# Patient Record
Sex: FEMALE | Race: WHITE | NOT HISPANIC OR LATINO
[De-identification: names, ages, dates, MRNs, and addresses within clinical notes are randomized per-mention and may not be internally consistent; named-entity substitution may affect disease eponyms.]

---

## 2019-10-21 ENCOUNTER — APPOINTMENT (OUTPATIENT)
Dept: OBGYN | Facility: CLINIC | Age: 49
End: 2019-10-21
Payer: COMMERCIAL

## 2019-10-21 ENCOUNTER — RECORD ABSTRACTING (OUTPATIENT)
Age: 49
End: 2019-10-21

## 2019-10-21 ENCOUNTER — OUTPATIENT (OUTPATIENT)
Dept: OUTPATIENT SERVICES | Facility: HOSPITAL | Age: 49
LOS: 1 days | Discharge: HOME | End: 2019-10-21

## 2019-10-21 VITALS
WEIGHT: 204 LBS | HEIGHT: 64 IN | BODY MASS INDEX: 34.83 KG/M2 | HEART RATE: 81 BPM | DIASTOLIC BLOOD PRESSURE: 82 MMHG | SYSTOLIC BLOOD PRESSURE: 128 MMHG

## 2019-10-21 DIAGNOSIS — R10.2 PELVIC AND PERINEAL PAIN: ICD-10-CM

## 2019-10-21 DIAGNOSIS — Z00.00 ENCOUNTER FOR GENERAL ADULT MEDICAL EXAMINATION W/OUT ABNORMAL FINDINGS: ICD-10-CM

## 2019-10-21 LAB — CYTOLOGY CVX/VAG DOC THIN PREP: NORMAL

## 2019-10-21 PROCEDURE — 99396 PREV VISIT EST AGE 40-64: CPT

## 2019-10-21 NOTE — HISTORY OF PRESENT ILLNESS
[Menarche Age: ____] : age at menarche was [unfilled] [Definite:  ___ (Date)] : the last menstrual period was [unfilled] [NA] : N/A [Sexually Active] : is not sexually active

## 2019-10-21 NOTE — CHIEF COMPLAINT
[Annual Visit] : annual visit [FreeTextEntry1] : patient is here for annual visit last Pap smear 8-2018 menses every month regular  moderate flow lasting 5 days with moderate cramps ,

## 2019-10-21 NOTE — PHYSICAL EXAM
[Awake] : awake [Alert] : alert [Mass] : no breast mass [Soft] : soft [Nipple Discharge] : no nipple discharge [Axillary LAD] : no axillary lymphadenopathy [Tender] : non tender [Distended] : not distended [Oriented x3] : oriented to person, place, and time [Normal] : uterus [Uterine Adnexae] : were not tender and not enlarged

## 2019-10-23 DIAGNOSIS — Z00.00 ENCOUNTER FOR GENERAL ADULT MEDICAL EXAMINATION WITHOUT ABNORMAL FINDINGS: ICD-10-CM

## 2019-10-24 LAB — HPV HIGH+LOW RISK DNA PNL CVX: NOT DETECTED

## 2019-12-25 PROBLEM — R10.2 PELVIC PAIN IN FEMALE: Status: ACTIVE | Noted: 2019-10-21

## 2019-12-27 ENCOUNTER — TRANSCRIPTION ENCOUNTER (OUTPATIENT)
Age: 49
End: 2019-12-27

## 2020-01-09 ENCOUNTER — APPOINTMENT (OUTPATIENT)
Dept: NEUROSURGERY | Facility: CLINIC | Age: 50
End: 2020-01-09
Payer: COMMERCIAL

## 2020-01-09 ENCOUNTER — RX RENEWAL (OUTPATIENT)
Age: 50
End: 2020-01-09

## 2020-01-09 VITALS — WEIGHT: 200 LBS | BODY MASS INDEX: 34.15 KG/M2 | HEIGHT: 64 IN

## 2020-01-09 PROCEDURE — 99203 OFFICE O/P NEW LOW 30 MIN: CPT

## 2020-01-09 RX ORDER — ZONISAMIDE 100 MG/1
100 CAPSULE ORAL
Refills: 0 | Status: DISCONTINUED | COMMUNITY
End: 2020-01-09

## 2020-01-09 RX ORDER — EXTENDED PHENYTOIN SODIUM 30 MG/1
CAPSULE ORAL
Refills: 0 | Status: DISCONTINUED | COMMUNITY
End: 2020-01-09

## 2020-01-13 NOTE — PLAN
[FreeTextEntry1] : I discussed the MRI and clinical findings with Ms. Moran in detail.  She may be symptomatic from this C6-7 disc herniation but she has not tried conservative management.  I recommend PT and injections if PT is ineffective.  She will return on an as needed basis.

## 2020-01-13 NOTE — HISTORY OF PRESENT ILLNESS
[FreeTextEntry1] : neck pain [de-identified] : Ms. Moran is a 49 year-old female who presents with neck pain radiating to her right arm.  It is associated with numbness and weakness, although subtle.  She has not had PT or injections as of yet.\par \par MRI demonstrates a left C6-7 HNP

## 2020-06-22 ENCOUNTER — APPOINTMENT (OUTPATIENT)
Dept: NEUROSURGERY | Facility: CLINIC | Age: 50
End: 2020-06-22
Payer: COMMERCIAL

## 2020-06-22 VITALS — BODY MASS INDEX: 34.15 KG/M2 | HEIGHT: 64 IN | WEIGHT: 200 LBS

## 2020-06-22 DIAGNOSIS — M47.812 SPONDYLOSIS W/OUT MYELOPATHY OR RADICULOPATHY, CERVICAL REGION: ICD-10-CM

## 2020-06-22 DIAGNOSIS — Z78.9 OTHER SPECIFIED HEALTH STATUS: ICD-10-CM

## 2020-06-22 DIAGNOSIS — N81.11 CYSTOCELE, MIDLINE: ICD-10-CM

## 2020-06-22 DIAGNOSIS — Z87.42 PERSONAL HISTORY OF OTHER DISEASES OF THE FEMALE GENITAL TRACT: ICD-10-CM

## 2020-06-22 DIAGNOSIS — Z87.19 PERSONAL HISTORY OF OTHER DISEASES OF THE DIGESTIVE SYSTEM: ICD-10-CM

## 2020-06-22 DIAGNOSIS — M50.10 CERVICAL DISC DISORDER WITH RADICULOPATHY, UNSPECIFIED CERVICAL REGION: ICD-10-CM

## 2020-06-22 PROCEDURE — 99213 OFFICE O/P EST LOW 20 MIN: CPT | Mod: 95

## 2020-06-22 RX ORDER — CARBAMAZEPINE 400 MG/1
400 TABLET, EXTENDED RELEASE ORAL
Refills: 0 | Status: DISCONTINUED | COMMUNITY
End: 2020-06-22

## 2020-06-22 NOTE — REASON FOR VISIT
[Home] : at home, [unfilled] , at the time of the visit. [Medical Office: ([facility])___] : at the medical office located in  [Verbal consent obtained from patient] : the patient, [unfilled]

## 2020-06-22 NOTE — ASSESSMENT
[FreeTextEntry1] : Ms. Moran will consult with pain management to discuss CESIs. I will see her back once pain management treatments are completed. \par \par This consultation took 15 minutes.

## 2020-06-22 NOTE — HISTORY OF PRESENT ILLNESS
[FreeTextEntry1] : Ms. Moran continues to have neck pain with referred pain into the right shoulder and arm. She has tingling in the right 3rd digits also. She has completed a course of PT. She has not trialed ESIs. At times she feel's some weakness in the right arm compared to the left. No bowel / bladder dysfunction. Prior MRI confirmed a left C6/7 herniated disc.

## 2020-08-31 ENCOUNTER — APPOINTMENT (OUTPATIENT)
Dept: NEUROLOGY | Facility: CLINIC | Age: 50
End: 2020-08-31
Payer: COMMERCIAL

## 2020-08-31 VITALS
WEIGHT: 200 LBS | HEART RATE: 87 BPM | BODY MASS INDEX: 34.15 KG/M2 | TEMPERATURE: 98.2 F | OXYGEN SATURATION: 97 % | SYSTOLIC BLOOD PRESSURE: 126 MMHG | DIASTOLIC BLOOD PRESSURE: 70 MMHG | HEIGHT: 64 IN

## 2020-08-31 PROCEDURE — 99214 OFFICE O/P EST MOD 30 MIN: CPT

## 2020-09-02 NOTE — HISTORY OF PRESENT ILLNESS
[FreeTextEntry1] : Vani is herefor her F/U last time she was here was Nov.2018?\par She carries the Diagnosis of seizure disorder . not well characterized .\par in the past the EEG that we had done were normal. She was offered to come off the AED or at least to consider monotherapy. she did not agree.\par She also has only scattered follow ups.?\par Today she is not reporting any events since last visit . did not do a blood level. 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

## 2020-09-02 NOTE — ASSESSMENT
[FreeTextEntry1] : Hx of seizure disorder not well characterized\par \par again the options were discussed\par will do level and talk on the phone for the next step if she agrees

## 2020-09-02 NOTE — PHYSICAL EXAM
[FreeTextEntry1] : A/A/Ox3 \par follows 4 step commands\par in a good mood\par good attention and concentration\par CN--normal\par Motor--- normal\par has mid valgus deformity of the right knee when walks\par stable gait

## 2020-10-02 ENCOUNTER — APPOINTMENT (OUTPATIENT)
Dept: NEUROLOGY | Facility: CLINIC | Age: 50
End: 2020-10-02
Payer: COMMERCIAL

## 2020-10-02 PROCEDURE — 95816 EEG AWAKE AND DROWSY: CPT

## 2020-10-05 ENCOUNTER — TRANSCRIPTION ENCOUNTER (OUTPATIENT)
Age: 50
End: 2020-10-05

## 2021-01-13 ENCOUNTER — APPOINTMENT (OUTPATIENT)
Dept: NEUROLOGY | Facility: CLINIC | Age: 51
End: 2021-01-13
Payer: COMMERCIAL

## 2021-01-13 PROCEDURE — 99213 OFFICE O/P EST LOW 20 MIN: CPT | Mod: 95

## 2021-01-13 NOTE — HISTORY OF PRESENT ILLNESS
[Home] : at home, [unfilled] , at the time of the visit. 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 but does not feel tired.\par She says her level of energy is also good\par She has had a blood test done in September. The ZNS level is 5.1 and the Dilantin level is 7.6\par The Vit D level is also low 15. \par Her EEG done after the last visit is normal\par Her lipid profile was normal and so was her CMP and CBC\par No HA\par NO spinal pain

## 2021-01-13 NOTE — PHYSICAL EXAM
[FreeTextEntry1] : A/A/Ox3 follows 4 step commands\par crosses the midline well\par Good attention\par good mood\par CN-- normal\par Motor-- no drift. No fixation

## 2021-01-13 NOTE — ASSESSMENT
[FreeTextEntry1] : Hx of SEizure D/O not well characterized\par Low Vit D level\par \par Plan\par again discussed with her specially considering the low VIt D and  , low dilantin level and not having seizures..it is better to at least come off the dilantin\par will call with her decision

## 2021-04-05 ENCOUNTER — APPOINTMENT (OUTPATIENT)
Dept: OBGYN | Facility: CLINIC | Age: 51
End: 2021-04-05

## 2021-06-01 ENCOUNTER — APPOINTMENT (OUTPATIENT)
Age: 51
End: 2021-06-01

## 2021-06-02 ENCOUNTER — NON-APPOINTMENT (OUTPATIENT)
Age: 51
End: 2021-06-02

## 2021-06-03 ENCOUNTER — APPOINTMENT (OUTPATIENT)
Dept: SURGERY | Facility: CLINIC | Age: 51
End: 2021-06-03
Payer: COMMERCIAL

## 2021-06-03 ENCOUNTER — NON-APPOINTMENT (OUTPATIENT)
Age: 51
End: 2021-06-03

## 2021-06-03 VITALS — HEIGHT: 64 IN | WEIGHT: 203 LBS | BODY MASS INDEX: 34.66 KG/M2

## 2021-06-03 DIAGNOSIS — M62.08 SEPARATION OF MUSCLE (NONTRAUMATIC), OTHER SITE: ICD-10-CM

## 2021-06-03 PROCEDURE — 99243 OFF/OP CNSLTJ NEW/EST LOW 30: CPT

## 2021-06-03 NOTE — PHYSICAL EXAM
[JVD] : no jugular venous distention  [Normal Breath Sounds] : Normal breath sounds [No Rash or Lesion] : No rash or lesion [Alert] : alert [Calm] : calm [de-identified] : overweight [de-identified] : normal [de-identified] : protuberant abdomen, moderate diastasis recti\par  [de-identified] : large incarcerated periumbilical incisional hernia

## 2021-06-03 NOTE — CONSULT LETTER
[Dear  ___] : Dear  [unfilled], [Courtesy Letter:] : I had the pleasure of seeing your patient, [unfilled], in my office today. [Please see my note below.] : Please see my note below. [Consult Closing:] : Thank you very much for allowing me to participate in the care of this patient.  If you have any questions, please do not hesitate to contact me. [FreeTextEntry3] : Respectfully,\par \par Ishan Joseph M.D., FACS\par  [DrLb  ___] : Dr. DAN

## 2021-06-03 NOTE — ASSESSMENT
[FreeTextEntry1] : Vani is a pleasant 51-year-old woman with a past medical history significant for well-controlled seizure disorder (last seizure over 10 years ago) and cigarette smoking (approximately 10 cigarettes a day) any past surgical history significant for a recent laparoscopic procedure to remove her right ovary, both fallopian tubes and a large cyst complicated by a large periumbilical incisional hernia confirmed by recent CT scan now warranting surgical evaluation.\par \par Physical examination demonstrates a large tender grapefruit size bulge in the left periumbilical region which is not reducible consistent with a large incarcerated periumbilical incisional hernia requiring surgical repair. There is no evidence of strangulation and the patient denies any symptoms of obstruction. She does have a moderate diastasis recti which is likely related to her excess abdominal weight. Her current BMI is 35.\par \par I explained the pros and cons of surgery, as well as all risks, benefits, indications and alternatives of the procedure and the patient understood and agreed. Vani was scheduled for the repair of her incarcerated incisional hernia with mesh on Wednesday, July 14, 2021 under LOCAL with [facility] at  with presurgical testing preceding this date. She was encouraged to avoid heavy lifting and strenuous activity in the interim, of course.  We also discussed the importance of calorie restriction and healthy eating with regard to weight loss, hernia recurrence and her overall health.\par \par After our discussion, she is aware of the dangers of cigarette smoking, especially with regard to wound healing, wound complications, hernia development and hernia recurrence.  She was encouraged to quit or minimize smoking in the perioperative period and has agreed to try.

## 2021-06-23 ENCOUNTER — OUTPATIENT (OUTPATIENT)
Dept: OUTPATIENT SERVICES | Facility: HOSPITAL | Age: 51
LOS: 1 days | Discharge: HOME | End: 2021-06-23
Payer: COMMERCIAL

## 2021-06-23 VITALS
RESPIRATION RATE: 14 BRPM | WEIGHT: 206.13 LBS | OXYGEN SATURATION: 97 % | HEART RATE: 73 BPM | HEIGHT: 64 IN | SYSTOLIC BLOOD PRESSURE: 126 MMHG | TEMPERATURE: 98 F | DIASTOLIC BLOOD PRESSURE: 74 MMHG

## 2021-06-23 DIAGNOSIS — Z90.721 ACQUIRED ABSENCE OF OVARIES, UNILATERAL: Chronic | ICD-10-CM

## 2021-06-23 DIAGNOSIS — K43.0 INCISIONAL HERNIA WITH OBSTRUCTION, WITHOUT GANGRENE: ICD-10-CM

## 2021-06-23 DIAGNOSIS — Z01.818 ENCOUNTER FOR OTHER PREPROCEDURAL EXAMINATION: ICD-10-CM

## 2021-06-23 DIAGNOSIS — Z90.79 ACQUIRED ABSENCE OF OTHER GENITAL ORGAN(S): Chronic | ICD-10-CM

## 2021-06-23 LAB
ALBUMIN SERPL ELPH-MCNC: 4.2 G/DL — SIGNIFICANT CHANGE UP (ref 3.5–5.2)
ALP SERPL-CCNC: 236 U/L — HIGH (ref 30–115)
ALT FLD-CCNC: 21 U/L — SIGNIFICANT CHANGE UP (ref 0–41)
ANION GAP SERPL CALC-SCNC: 12 MMOL/L — SIGNIFICANT CHANGE UP (ref 7–14)
APPEARANCE UR: CLEAR — SIGNIFICANT CHANGE UP
AST SERPL-CCNC: 14 U/L — SIGNIFICANT CHANGE UP (ref 0–41)
BASOPHILS # BLD AUTO: 0.06 K/UL — SIGNIFICANT CHANGE UP (ref 0–0.2)
BASOPHILS NFR BLD AUTO: 0.7 % — SIGNIFICANT CHANGE UP (ref 0–1)
BILIRUB SERPL-MCNC: 0.3 MG/DL — SIGNIFICANT CHANGE UP (ref 0.2–1.2)
BILIRUB UR-MCNC: NEGATIVE — SIGNIFICANT CHANGE UP
BUN SERPL-MCNC: 12 MG/DL — SIGNIFICANT CHANGE UP (ref 10–20)
CALCIUM SERPL-MCNC: 8.8 MG/DL — SIGNIFICANT CHANGE UP (ref 8.5–10.1)
CHLORIDE SERPL-SCNC: 107 MMOL/L — SIGNIFICANT CHANGE UP (ref 98–110)
CO2 SERPL-SCNC: 22 MMOL/L — SIGNIFICANT CHANGE UP (ref 17–32)
COLOR SPEC: COLORLESS — SIGNIFICANT CHANGE UP
CREAT SERPL-MCNC: 0.6 MG/DL — LOW (ref 0.7–1.5)
DIFF PNL FLD: NEGATIVE — SIGNIFICANT CHANGE UP
EOSINOPHIL # BLD AUTO: 0.11 K/UL — SIGNIFICANT CHANGE UP (ref 0–0.7)
EOSINOPHIL NFR BLD AUTO: 1.3 % — SIGNIFICANT CHANGE UP (ref 0–8)
GLUCOSE SERPL-MCNC: 86 MG/DL — SIGNIFICANT CHANGE UP (ref 70–99)
GLUCOSE UR QL: NEGATIVE — SIGNIFICANT CHANGE UP
HCT VFR BLD CALC: 42.9 % — SIGNIFICANT CHANGE UP (ref 37–47)
HGB BLD-MCNC: 14.4 G/DL — SIGNIFICANT CHANGE UP (ref 12–16)
IMM GRANULOCYTES NFR BLD AUTO: 0.4 % — HIGH (ref 0.1–0.3)
KETONES UR-MCNC: NEGATIVE — SIGNIFICANT CHANGE UP
LEUKOCYTE ESTERASE UR-ACNC: NEGATIVE — SIGNIFICANT CHANGE UP
LYMPHOCYTES # BLD AUTO: 2 K/UL — SIGNIFICANT CHANGE UP (ref 1.2–3.4)
LYMPHOCYTES # BLD AUTO: 24.4 % — SIGNIFICANT CHANGE UP (ref 20.5–51.1)
MCHC RBC-ENTMCNC: 30.1 PG — SIGNIFICANT CHANGE UP (ref 27–31)
MCHC RBC-ENTMCNC: 33.6 G/DL — SIGNIFICANT CHANGE UP (ref 32–37)
MCV RBC AUTO: 89.7 FL — SIGNIFICANT CHANGE UP (ref 81–99)
MONOCYTES # BLD AUTO: 0.67 K/UL — HIGH (ref 0.1–0.6)
MONOCYTES NFR BLD AUTO: 8.2 % — SIGNIFICANT CHANGE UP (ref 1.7–9.3)
NEUTROPHILS # BLD AUTO: 5.34 K/UL — SIGNIFICANT CHANGE UP (ref 1.4–6.5)
NEUTROPHILS NFR BLD AUTO: 65 % — SIGNIFICANT CHANGE UP (ref 42.2–75.2)
NITRITE UR-MCNC: NEGATIVE — SIGNIFICANT CHANGE UP
NRBC # BLD: 0 /100 WBCS — SIGNIFICANT CHANGE UP (ref 0–0)
PH UR: 6.5 — SIGNIFICANT CHANGE UP (ref 5–8)
PLATELET # BLD AUTO: 247 K/UL — SIGNIFICANT CHANGE UP (ref 130–400)
POTASSIUM SERPL-MCNC: 4.2 MMOL/L — SIGNIFICANT CHANGE UP (ref 3.5–5)
POTASSIUM SERPL-SCNC: 4.2 MMOL/L — SIGNIFICANT CHANGE UP (ref 3.5–5)
PROT SERPL-MCNC: 6.5 G/DL — SIGNIFICANT CHANGE UP (ref 6–8)
PROT UR-MCNC: NEGATIVE — SIGNIFICANT CHANGE UP
RBC # BLD: 4.78 M/UL — SIGNIFICANT CHANGE UP (ref 4.2–5.4)
RBC # FLD: 12.3 % — SIGNIFICANT CHANGE UP (ref 11.5–14.5)
SODIUM SERPL-SCNC: 141 MMOL/L — SIGNIFICANT CHANGE UP (ref 135–146)
SP GR SPEC: 1 — LOW (ref 1.01–1.03)
UROBILINOGEN FLD QL: SIGNIFICANT CHANGE UP
WBC # BLD: 8.21 K/UL — SIGNIFICANT CHANGE UP (ref 4.8–10.8)
WBC # FLD AUTO: 8.21 K/UL — SIGNIFICANT CHANGE UP (ref 4.8–10.8)

## 2021-06-23 PROCEDURE — 93010 ELECTROCARDIOGRAM REPORT: CPT

## 2021-06-23 NOTE — H&P PST ADULT - NSANTHOSAYNRD_GEN_A_CORE
No. NITZA screening performed.  STOP BANG Legend: 0-2 = LOW Risk; 3-4 = INTERMEDIATE Risk; 5-8 = HIGH Risk

## 2021-06-23 NOTE — H&P PST ADULT - NSICDXPASTMEDICALHX_GEN_ALL_CORE_FT
PAST MEDICAL HISTORY:  Cervical herniated disc     History of epilepsy     Pancreatic enlargement tail of pancreas enlarged/inflammed    Pulmonary nodule

## 2021-06-23 NOTE — H&P PST ADULT - HISTORY OF PRESENT ILLNESS
51y Female presents today for presurgical testing for incarcerated incisional hernia repair with mesh. Patient states that since december 2020 she has had a "bulge" in her abdomen which with deep palpation caused her pain. She states since that time, she has had more frequent bowel movements. She states that this bulge appeared shortly after her procedure in december (laparoscopic unilateral oophorectomy and bilateral salpingectomy.).  Patient denies any CP, palpitations, SOB, cough, or dysuria. No recent URI or UTI.  Stated exercise tolerance is FOS walks every evening for 30-40 minutes            NITZA screen reviewed    Patient denies any recent personal exposure to COVID19. Denies any sick contacts. Patient denies any travel within the past 30 days. Patient was instructed to quarantine until after procedure  PATIENT ENDORSES HAVING RECEIVED DOSE #2 OF MODERNA COVID19 VACCINE FINAL WEEK OF 2/2021    K43.0/ 00522/ 82477  Incisional hernia with obstruction but no gangrene  Encounter for other preprocedural examination  ^K43.0/ 45660/ 29760    Anesthesia Alert  YES--Difficult Airway - CLASS IV  NO--History of neck surgery or radiation  NO--Limited ROM of neck  NO--History of Malignant hyperthermia  NO--Personal or family history of Pseudocholinesterase deficiency.  NO--Prior Anesthesia Complication  NO--Latex Allergy  NO--Loose teeth  NO--History of Rheumatoid Arthritis  NO--NITZA  NO--Bleeding risk  NO--Other_____    Patient states that this is their complete medical history and list of medications

## 2021-06-24 ENCOUNTER — RESULT REVIEW (OUTPATIENT)
Age: 51
End: 2021-06-24

## 2021-06-24 PROBLEM — R91.1 SOLITARY PULMONARY NODULE: Chronic | Status: ACTIVE | Noted: 2021-06-23

## 2021-06-24 PROBLEM — M50.20 OTHER CERVICAL DISC DISPLACEMENT, UNSPECIFIED CERVICAL REGION: Chronic | Status: ACTIVE | Noted: 2021-06-23

## 2021-06-24 PROBLEM — K86.9 DISEASE OF PANCREAS, UNSPECIFIED: Chronic | Status: ACTIVE | Noted: 2021-06-23

## 2021-06-24 PROBLEM — Z86.69 PERSONAL HISTORY OF OTHER DISEASES OF THE NERVOUS SYSTEM AND SENSE ORGANS: Chronic | Status: ACTIVE | Noted: 2021-06-23

## 2021-06-24 PROCEDURE — 71046 X-RAY EXAM CHEST 2 VIEWS: CPT | Mod: 26

## 2021-07-11 ENCOUNTER — LABORATORY RESULT (OUTPATIENT)
Age: 51
End: 2021-07-11

## 2021-07-11 ENCOUNTER — OUTPATIENT (OUTPATIENT)
Dept: OUTPATIENT SERVICES | Facility: HOSPITAL | Age: 51
LOS: 1 days | Discharge: HOME | End: 2021-07-11

## 2021-07-11 DIAGNOSIS — Z11.59 ENCOUNTER FOR SCREENING FOR OTHER VIRAL DISEASES: ICD-10-CM

## 2021-07-11 DIAGNOSIS — Z90.721 ACQUIRED ABSENCE OF OVARIES, UNILATERAL: Chronic | ICD-10-CM

## 2021-07-11 DIAGNOSIS — Z90.79 ACQUIRED ABSENCE OF OTHER GENITAL ORGAN(S): Chronic | ICD-10-CM

## 2021-07-13 NOTE — ASU PATIENT PROFILE, ADULT - PMH
Cervical herniated disc    History of epilepsy    Pancreatic enlargement  tail of pancreas enlarged/inflammed  Pulmonary nodule

## 2021-07-14 ENCOUNTER — APPOINTMENT (OUTPATIENT)
Dept: SURGERY | Facility: AMBULATORY SURGERY CENTER | Age: 51
End: 2021-07-14
Payer: COMMERCIAL

## 2021-07-14 ENCOUNTER — OUTPATIENT (OUTPATIENT)
Dept: OUTPATIENT SERVICES | Facility: HOSPITAL | Age: 51
LOS: 1 days | Discharge: HOME | End: 2021-07-14

## 2021-07-14 VITALS
WEIGHT: 206.13 LBS | HEIGHT: 64 IN | HEART RATE: 75 BPM | RESPIRATION RATE: 18 BRPM | SYSTOLIC BLOOD PRESSURE: 136 MMHG | OXYGEN SATURATION: 96 % | TEMPERATURE: 99 F | DIASTOLIC BLOOD PRESSURE: 76 MMHG

## 2021-07-14 VITALS
SYSTOLIC BLOOD PRESSURE: 162 MMHG | TEMPERATURE: 98 F | DIASTOLIC BLOOD PRESSURE: 91 MMHG | OXYGEN SATURATION: 96 % | HEART RATE: 67 BPM | RESPIRATION RATE: 20 BRPM

## 2021-07-14 DIAGNOSIS — Z90.721 ACQUIRED ABSENCE OF OVARIES, UNILATERAL: Chronic | ICD-10-CM

## 2021-07-14 DIAGNOSIS — Z90.79 ACQUIRED ABSENCE OF OTHER GENITAL ORGAN(S): Chronic | ICD-10-CM

## 2021-07-14 PROCEDURE — 49561: CPT

## 2021-07-14 PROCEDURE — 49585: CPT | Mod: XS

## 2021-07-14 PROCEDURE — 49568: CPT

## 2021-07-14 RX ORDER — SODIUM CHLORIDE 9 MG/ML
1000 INJECTION, SOLUTION INTRAVENOUS
Refills: 0 | Status: DISCONTINUED | OUTPATIENT
Start: 2021-07-14 | End: 2021-07-29

## 2021-07-14 RX ORDER — OXYCODONE AND ACETAMINOPHEN 5; 325 MG/1; MG/1
1 TABLET ORAL EVERY 4 HOURS
Refills: 0 | Status: DISCONTINUED | OUTPATIENT
Start: 2021-07-14 | End: 2021-07-14

## 2021-07-14 RX ORDER — HYDROMORPHONE HYDROCHLORIDE 2 MG/ML
0.5 INJECTION INTRAMUSCULAR; INTRAVENOUS; SUBCUTANEOUS
Refills: 0 | Status: DISCONTINUED | OUTPATIENT
Start: 2021-07-14 | End: 2021-07-14

## 2021-07-14 RX ORDER — TRAMADOL HYDROCHLORIDE 50 MG/1
1 TABLET ORAL
Qty: 30 | Refills: 0
Start: 2021-07-14 | End: 2021-07-18

## 2021-07-14 RX ORDER — ONDANSETRON 8 MG/1
4 TABLET, FILM COATED ORAL ONCE
Refills: 0 | Status: DISCONTINUED | OUTPATIENT
Start: 2021-07-14 | End: 2021-07-29

## 2021-07-14 RX ADMIN — OXYCODONE AND ACETAMINOPHEN 1 TABLET(S): 5; 325 TABLET ORAL at 17:31

## 2021-07-14 RX ADMIN — SODIUM CHLORIDE 100 MILLILITER(S): 9 INJECTION, SOLUTION INTRAVENOUS at 16:52

## 2021-07-14 NOTE — CHART NOTE - NSCHARTNOTEFT_GEN_A_CORE
PACU ANESTHESIA ADMISSION NOTE      Procedure: Repair of incarcerated incisional hernia using mesh      Post op diagnosis:  Incarcerated incisional hernia        ____  Intubated  TV:______       Rate: ______      FiO2: ______    _x___  Patent Airway    __x__  Full return of protective reflexes    _x___  Full recovery from anesthesia / back to baseline     Vitals:   T:    97.8       R:   14               BP:  123/75                Sat:  99                P: 70      Mental Status:  ___x_ Awake   __x___ Alert   _____ Drowsy   _____ Sedated    Nausea/Vomiting:  __x__ NO  ______Yes,   See Post - Op Orders          Pain Scale (0-10):  _____    Treatment: __x__ None    ____ See Post - Op/PCA Orders    Post - Operative Fluids:   ____ Oral   _x___ See Post - Op Orders    Plan: Discharge:   __x__Home       _____Floor     _____Critical Care    _____  Other:_________________    Comments:

## 2021-07-16 DIAGNOSIS — K43.0 INCISIONAL HERNIA WITH OBSTRUCTION, WITHOUT GANGRENE: ICD-10-CM

## 2021-07-16 DIAGNOSIS — G40.909 EPILEPSY, UNSPECIFIED, NOT INTRACTABLE, WITHOUT STATUS EPILEPTICUS: ICD-10-CM

## 2021-07-16 DIAGNOSIS — K42.9 UMBILICAL HERNIA WITHOUT OBSTRUCTION OR GANGRENE: ICD-10-CM

## 2021-07-22 ENCOUNTER — APPOINTMENT (OUTPATIENT)
Dept: SURGERY | Facility: CLINIC | Age: 51
End: 2021-07-22
Payer: COMMERCIAL

## 2021-07-22 DIAGNOSIS — E66.09 OTHER OBESITY DUE TO EXCESS CALORIES: ICD-10-CM

## 2021-07-22 DIAGNOSIS — K43.0 INCISIONAL HERNIA WITH OBSTRUCTION, W/OUT GANGRENE: ICD-10-CM

## 2021-07-22 PROCEDURE — 99024 POSTOP FOLLOW-UP VISIT: CPT

## 2021-07-22 NOTE — CONSULT LETTER
[FreeTextEntry1] : Dear Dr. Raul Zamora, \par \par I had the pleasure of seeing your patient, TED ROSE, in my office today. Please see my note below. \par \par Thank you very much for allowing me to participate in the care of this patient. If you have any questions, please do not hesitate to contact me. \par \par \par Respectfully,\par \par Ishan Joseph M.D., FACS\par  \par \par \par \par cc: Dr. Hugo Felder

## 2021-07-22 NOTE — ASSESSMENT
[FreeTextEntry1] : Vani underwent the repair of her incarcerated incisional hernia with mesh and repair of her umbilical hernia with mesh on July 14, 2021 under local with IV sedation without any problems or complications. Her wound is clean, dry and intact. There is no evidence of erythema, seroma formation or infection. She is tolerating a diet and having normal bowel movements. She denies any significant postoperative pain or discomfort at this time.\par \par She was counseled and reassured. Vani was discharged from the office with no specific followup necessary, but she knows to avoid any heavy lifting or strenuous activity for the next several weeks. We also discussed the importance of calorie restriction and healthy eating with regard to weight loss, hernia recurrence and her overall health. She was encouraged to continue to wear her abdominal binder for the better part of the next month.

## 2021-08-03 ENCOUNTER — APPOINTMENT (OUTPATIENT)
Age: 51
End: 2021-08-03
Payer: COMMERCIAL

## 2021-08-03 VITALS
DIASTOLIC BLOOD PRESSURE: 70 MMHG | OXYGEN SATURATION: 97 % | BODY MASS INDEX: 34.66 KG/M2 | WEIGHT: 203 LBS | SYSTOLIC BLOOD PRESSURE: 112 MMHG | HEIGHT: 64 IN | HEART RATE: 90 BPM | RESPIRATION RATE: 14 BRPM

## 2021-08-03 DIAGNOSIS — J44.9 CHRONIC OBSTRUCTIVE PULMONARY DISEASE, UNSPECIFIED: ICD-10-CM

## 2021-08-03 DIAGNOSIS — R91.1 SOLITARY PULMONARY NODULE: ICD-10-CM

## 2021-08-03 PROCEDURE — 99203 OFFICE O/P NEW LOW 30 MIN: CPT

## 2021-08-03 NOTE — PHYSICAL EXAM
[No Acute Distress] : no acute distress [Normal Oropharynx] : normal oropharynx [No Resp Distress] : no resp distress [Clear to Auscultation Bilaterally] : clear to auscultation bilaterally [No Abnormalities] : no abnormalities [Benign] : benign

## 2021-08-03 NOTE — HISTORY OF PRESENT ILLNESS
[Current] : current [< 30 pack-years] : < 30 pack-years [TextBox_4] : 50 yo female , smoker, referred to the clinic for pulmonary nodule evaluation\par Patient is a 30 pack year smoker. She complains of intermittent wheezing. She denies chronic cough , congestion or sputum.\par No Shortness of breath\par CT chest revealed 7 mm nodular opacity in right lower lobe and reticular scarring in the middle lobe , RLL and LLL.\par denies hemoptysis, weight loss or night sweats\par referred no prior CT

## 2021-12-23 ENCOUNTER — TRANSCRIPTION ENCOUNTER (OUTPATIENT)
Age: 51
End: 2021-12-23

## 2022-02-12 ENCOUNTER — TRANSCRIPTION ENCOUNTER (OUTPATIENT)
Age: 52
End: 2022-02-12

## 2022-04-09 ENCOUNTER — TRANSCRIPTION ENCOUNTER (OUTPATIENT)
Age: 52
End: 2022-04-09

## 2022-05-23 ENCOUNTER — EMERGENCY (EMERGENCY)
Facility: HOSPITAL | Age: 52
LOS: 0 days | Discharge: HOME | End: 2022-05-23
Attending: EMERGENCY MEDICINE | Admitting: EMERGENCY MEDICINE
Payer: COMMERCIAL

## 2022-05-23 VITALS
RESPIRATION RATE: 18 BRPM | TEMPERATURE: 97 F | OXYGEN SATURATION: 97 % | HEIGHT: 64 IN | DIASTOLIC BLOOD PRESSURE: 80 MMHG | SYSTOLIC BLOOD PRESSURE: 141 MMHG | HEART RATE: 87 BPM | WEIGHT: 203.93 LBS

## 2022-05-23 DIAGNOSIS — F17.200 NICOTINE DEPENDENCE, UNSPECIFIED, UNCOMPLICATED: ICD-10-CM

## 2022-05-23 DIAGNOSIS — Z90.2 ACQUIRED ABSENCE OF LUNG [PART OF]: ICD-10-CM

## 2022-05-23 DIAGNOSIS — Z90.721 ACQUIRED ABSENCE OF OVARIES, UNILATERAL: Chronic | ICD-10-CM

## 2022-05-23 DIAGNOSIS — R10.30 LOWER ABDOMINAL PAIN, UNSPECIFIED: ICD-10-CM

## 2022-05-23 DIAGNOSIS — G40.909 EPILEPSY, UNSPECIFIED, NOT INTRACTABLE, WITHOUT STATUS EPILEPTICUS: ICD-10-CM

## 2022-05-23 DIAGNOSIS — R10.2 PELVIC AND PERINEAL PAIN: ICD-10-CM

## 2022-05-23 DIAGNOSIS — Z90.79 ACQUIRED ABSENCE OF OTHER GENITAL ORGAN(S): Chronic | ICD-10-CM

## 2022-05-23 LAB
ALBUMIN SERPL ELPH-MCNC: 4 G/DL — SIGNIFICANT CHANGE UP (ref 3.5–5.2)
ALP SERPL-CCNC: 204 U/L — HIGH (ref 30–115)
ALT FLD-CCNC: 22 U/L — SIGNIFICANT CHANGE UP (ref 0–41)
ANION GAP SERPL CALC-SCNC: 10 MMOL/L — SIGNIFICANT CHANGE UP (ref 7–14)
APPEARANCE UR: CLEAR — SIGNIFICANT CHANGE UP
AST SERPL-CCNC: 17 U/L — SIGNIFICANT CHANGE UP (ref 0–41)
BASOPHILS # BLD AUTO: 0.05 K/UL — SIGNIFICANT CHANGE UP (ref 0–0.2)
BASOPHILS NFR BLD AUTO: 0.5 % — SIGNIFICANT CHANGE UP (ref 0–1)
BILIRUB SERPL-MCNC: <0.2 MG/DL — SIGNIFICANT CHANGE UP (ref 0.2–1.2)
BILIRUB UR-MCNC: NEGATIVE — SIGNIFICANT CHANGE UP
BUN SERPL-MCNC: 13 MG/DL — SIGNIFICANT CHANGE UP (ref 10–20)
CALCIUM SERPL-MCNC: 9.4 MG/DL — SIGNIFICANT CHANGE UP (ref 8.5–10.1)
CHLORIDE SERPL-SCNC: 104 MMOL/L — SIGNIFICANT CHANGE UP (ref 98–110)
CO2 SERPL-SCNC: 22 MMOL/L — SIGNIFICANT CHANGE UP (ref 17–32)
COLOR SPEC: SIGNIFICANT CHANGE UP
CREAT SERPL-MCNC: 0.7 MG/DL — SIGNIFICANT CHANGE UP (ref 0.7–1.5)
DIFF PNL FLD: NEGATIVE — SIGNIFICANT CHANGE UP
EGFR: 105 ML/MIN/1.73M2 — SIGNIFICANT CHANGE UP
EOSINOPHIL # BLD AUTO: 0.13 K/UL — SIGNIFICANT CHANGE UP (ref 0–0.7)
EOSINOPHIL NFR BLD AUTO: 1.3 % — SIGNIFICANT CHANGE UP (ref 0–8)
GLUCOSE SERPL-MCNC: 99 MG/DL — SIGNIFICANT CHANGE UP (ref 70–99)
GLUCOSE UR QL: NEGATIVE — SIGNIFICANT CHANGE UP
HCT VFR BLD CALC: 38.9 % — SIGNIFICANT CHANGE UP (ref 37–47)
HGB BLD-MCNC: 13.6 G/DL — SIGNIFICANT CHANGE UP (ref 12–16)
IMM GRANULOCYTES NFR BLD AUTO: 0.4 % — HIGH (ref 0.1–0.3)
KETONES UR-MCNC: NEGATIVE — SIGNIFICANT CHANGE UP
LEUKOCYTE ESTERASE UR-ACNC: NEGATIVE — SIGNIFICANT CHANGE UP
LIDOCAIN IGE QN: 17 U/L — SIGNIFICANT CHANGE UP (ref 7–60)
LYMPHOCYTES # BLD AUTO: 2.13 K/UL — SIGNIFICANT CHANGE UP (ref 1.2–3.4)
LYMPHOCYTES # BLD AUTO: 21 % — SIGNIFICANT CHANGE UP (ref 20.5–51.1)
MCHC RBC-ENTMCNC: 31.9 PG — HIGH (ref 27–31)
MCHC RBC-ENTMCNC: 35 G/DL — SIGNIFICANT CHANGE UP (ref 32–37)
MCV RBC AUTO: 91.1 FL — SIGNIFICANT CHANGE UP (ref 81–99)
MONOCYTES # BLD AUTO: 0.85 K/UL — HIGH (ref 0.1–0.6)
MONOCYTES NFR BLD AUTO: 8.4 % — SIGNIFICANT CHANGE UP (ref 1.7–9.3)
NEUTROPHILS # BLD AUTO: 6.93 K/UL — HIGH (ref 1.4–6.5)
NEUTROPHILS NFR BLD AUTO: 68.4 % — SIGNIFICANT CHANGE UP (ref 42.2–75.2)
NITRITE UR-MCNC: NEGATIVE — SIGNIFICANT CHANGE UP
NRBC # BLD: 0 /100 WBCS — SIGNIFICANT CHANGE UP (ref 0–0)
PH UR: 5.5 — SIGNIFICANT CHANGE UP (ref 5–8)
PLATELET # BLD AUTO: 250 K/UL — SIGNIFICANT CHANGE UP (ref 130–400)
POTASSIUM SERPL-MCNC: 3.9 MMOL/L — SIGNIFICANT CHANGE UP (ref 3.5–5)
POTASSIUM SERPL-SCNC: 3.9 MMOL/L — SIGNIFICANT CHANGE UP (ref 3.5–5)
PROT SERPL-MCNC: 6.4 G/DL — SIGNIFICANT CHANGE UP (ref 6–8)
PROT UR-MCNC: NEGATIVE — SIGNIFICANT CHANGE UP
RBC # BLD: 4.27 M/UL — SIGNIFICANT CHANGE UP (ref 4.2–5.4)
RBC # FLD: 12.7 % — SIGNIFICANT CHANGE UP (ref 11.5–14.5)
SODIUM SERPL-SCNC: 136 MMOL/L — SIGNIFICANT CHANGE UP (ref 135–146)
SP GR SPEC: 1.01 — SIGNIFICANT CHANGE UP (ref 1.01–1.03)
UROBILINOGEN FLD QL: SIGNIFICANT CHANGE UP
WBC # BLD: 10.13 K/UL — SIGNIFICANT CHANGE UP (ref 4.8–10.8)
WBC # FLD AUTO: 10.13 K/UL — SIGNIFICANT CHANGE UP (ref 4.8–10.8)

## 2022-05-23 PROCEDURE — 76830 TRANSVAGINAL US NON-OB: CPT | Mod: 26

## 2022-05-23 PROCEDURE — 99285 EMERGENCY DEPT VISIT HI MDM: CPT

## 2022-05-23 RX ORDER — FLUCONAZOLE 150 MG/1
150 TABLET ORAL ONCE
Refills: 0 | Status: COMPLETED | OUTPATIENT
Start: 2022-05-23 | End: 2022-05-23

## 2022-05-23 RX ORDER — SODIUM CHLORIDE 9 MG/ML
1000 INJECTION INTRAMUSCULAR; INTRAVENOUS; SUBCUTANEOUS ONCE
Refills: 0 | Status: COMPLETED | OUTPATIENT
Start: 2022-05-23 | End: 2022-05-23

## 2022-05-23 RX ADMIN — FLUCONAZOLE 150 MILLIGRAM(S): 150 TABLET ORAL at 04:46

## 2022-05-23 RX ADMIN — SODIUM CHLORIDE 1000 MILLILITER(S): 9 INJECTION INTRAMUSCULAR; INTRAVENOUS; SUBCUTANEOUS at 02:57

## 2022-05-23 NOTE — ED PROVIDER NOTE - CLINICAL SUMMARY MEDICAL DECISION MAKING FREE TEXT BOX
53 yo Female presented ED for pelvic pain.  Patient had blood work which was normal UA normal and ultrasound shows normal patient has appointment with her GYN doctor this week DC home with strict return precautions.

## 2022-05-23 NOTE — ED PROVIDER NOTE - OBJECTIVE STATEMENT
52-year-old female with past medical history of epilepsy and right pleurectomy status post large cyst presents to ED for 1 week of abdominal bloating and 1 day of lower pelvic abdominal pain.  Normal bowel movements no diarrhea.  No dysuria no hematuria no increased frequency no nausea no vomiting. No vaginal bleeding.

## 2022-05-23 NOTE — ED PROVIDER NOTE - PHYSICAL EXAMINATION
Const: Well nourished, well developed, appears stated age  Eyes: PERRL, no conjunctival injection  HENT:  Neck supple without meningismus   CV: RRR, Warm, well-perfused extremities  RESP: CTA B/L, no tachypnea   GI: soft, non-tender, non-distended  MSK: No gross deformities appreciated  Skin: Warm, dry. No rashes  Neuro: Alert, CNs II-XII grossly intact. Sensation and motor function of extremities grossly intact.  Psych: Appropriate mood and affect. Const: Well nourished, well developed, appears stated age  Eyes: PERRL, no conjunctival injection  HENT:  Neck supple without meningismus   CV: RRR, Warm, well-perfused extremities  RESP: CTA B/L, no tachypnea   GI: soft, non-tender, non-distended  Pelvic: thick white discharge  MSK: No gross deformities appreciated  Skin: Warm, dry. No rashes  Neuro: Alert, CNs II-XII grossly intact. Sensation and motor function of extremities grossly intact.  Psych: Appropriate mood and affect.

## 2022-05-23 NOTE — ED PROVIDER NOTE - PATIENT PORTAL LINK FT
You can access the FollowMyHealth Patient Portal offered by [facility] by registering at the following website: [email or web address]. By joining [facility]’s FollowMyHealth portal, you will also be able to view your health information using other applications (apps) compatible with our system.

## 2022-05-23 NOTE — ED ADULT NURSE NOTE - OBJECTIVE STATEMENT
pt  c/o pelvic pain x1 day, pt states there is some pressure when trying to have a BM. urinate.  pt denies bloody or any discharge.  pt denies pain or difficulty urinating.

## 2022-05-25 ENCOUNTER — NON-APPOINTMENT (OUTPATIENT)
Age: 52
End: 2022-05-25

## 2022-05-27 ENCOUNTER — RX RENEWAL (OUTPATIENT)
Age: 52
End: 2022-05-27

## 2022-06-27 ENCOUNTER — RX RENEWAL (OUTPATIENT)
Age: 52
End: 2022-06-27

## 2022-07-06 ENCOUNTER — RX RENEWAL (OUTPATIENT)
Age: 52
End: 2022-07-06

## 2022-08-31 ENCOUNTER — APPOINTMENT (OUTPATIENT)
Dept: NEUROLOGY | Facility: CLINIC | Age: 52
End: 2022-08-31

## 2022-08-31 VITALS
DIASTOLIC BLOOD PRESSURE: 80 MMHG | SYSTOLIC BLOOD PRESSURE: 126 MMHG | WEIGHT: 208 LBS | TEMPERATURE: 97.6 F | OXYGEN SATURATION: 99 % | HEART RATE: 88 BPM | HEIGHT: 64 IN | BODY MASS INDEX: 35.51 KG/M2

## 2022-08-31 PROCEDURE — 99213 OFFICE O/P EST LOW 20 MIN: CPT

## 2022-09-01 NOTE — PHYSICAL EXAM
[FreeTextEntry1] : GEN: NAD, pleasant, cooperative\par CVS: RRR, no carotid bruit\par CHEST: No signs of  distress, on room air\par ABD: Soft, NTTP\par NEURO: \par   MENTAL STATUS: AAOx3\par   LANG/SPEECH: Fluent, intact naming, repetition & comprehension\par   CRANIAL NERVES:\par   II: Pupils equal and reactive, no RAPD, normal visual field and fundus\par   III, IV, VI: EOM intact, no gaze preference or deviation\par   V: normal\par   VII: no facial asymmetry\par   VIII: normal hearing to speech\par   MOTOR: 5/5 in both upper and lower extremities\par   REFLEXES: 2/4 throughout, bilateral flexor plantars\par   COORD: Normal finger to nose and heel to shin, no tremor, no dysmetria\par

## 2022-09-01 NOTE — HISTORY OF PRESENT ILLNESS
[FreeTextEntry1] : Patient is here for follow up\par No seizures, Her sleep pattern is not the best. but does not feel tired.\par \par Current medication:\par -Dilantin 100mg 2 tabs at am , 3 tabs at night..total of 500 mg/ day\par -Zonisamide 100 mg at night, has been on this dose for 10-12 months\par \par No difficulty initiating sleep or maintaining sleep, but waking up due to hot flushes\par \par she is compliant with seizure medication\par Working as a teacher, th1th1th thgthrthathdthethrth\par \par She denies HA, nausea, vomiting, weakness.\par

## 2022-09-01 NOTE — REVIEW OF SYSTEMS
[Recent Weight Gain (___ Lbs)] : recent [unfilled] ~Ulb weight gain [As Noted in HPI] : as noted in HPI [Negative] : Constitutional [Seizures] : no convulsions [Dizziness] : no dizziness

## 2022-09-01 NOTE — ASSESSMENT
[FreeTextEntry1] : Hx of SEizure disorder, not well characterized\par Low Vit D level\par \par Plan\par Will check phenytoin, zonisamide, and vitamin D level\par Plan to take her off Dilantin eventually\par

## 2022-10-04 ENCOUNTER — NON-APPOINTMENT (OUTPATIENT)
Age: 52
End: 2022-10-04

## 2022-10-09 ENCOUNTER — NON-APPOINTMENT (OUTPATIENT)
Age: 52
End: 2022-10-09

## 2023-04-14 ENCOUNTER — RX RENEWAL (OUTPATIENT)
Age: 53
End: 2023-04-14

## 2023-04-27 NOTE — ASU PREOP CHECKLIST - AS BP NONINV SITE
left upper arm Zyclara Counseling:  I discussed with the patient the risks of imiquimod including but not limited to erythema, scaling, itching, weeping, crusting, and pain.  Patient understands that the inflammatory response to imiquimod is variable from person to person and was educated regarded proper titration schedule.  If flu-like symptoms develop, patient knows to discontinue the medication and contact us.

## 2023-07-21 ENCOUNTER — RX RENEWAL (OUTPATIENT)
Age: 53
End: 2023-07-21

## 2023-08-03 ENCOUNTER — RX RENEWAL (OUTPATIENT)
Age: 53
End: 2023-08-03

## 2023-08-07 ENCOUNTER — APPOINTMENT (OUTPATIENT)
Dept: ENDOCRINOLOGY | Facility: CLINIC | Age: 53
End: 2023-08-07
Payer: COMMERCIAL

## 2023-08-07 VITALS
HEIGHT: 64 IN | BODY MASS INDEX: 35 KG/M2 | DIASTOLIC BLOOD PRESSURE: 76 MMHG | RESPIRATION RATE: 18 BRPM | OXYGEN SATURATION: 96 % | WEIGHT: 205 LBS | HEART RATE: 77 BPM | SYSTOLIC BLOOD PRESSURE: 124 MMHG

## 2023-08-07 DIAGNOSIS — Z78.9 OTHER SPECIFIED HEALTH STATUS: ICD-10-CM

## 2023-08-07 DIAGNOSIS — Z02.82 ENCOUNTER FOR ADOPTION SERVICES: ICD-10-CM

## 2023-08-07 DIAGNOSIS — Z78.0 ASYMPTOMATIC MENOPAUSAL STATE: ICD-10-CM

## 2023-08-07 PROCEDURE — 99204 OFFICE O/P NEW MOD 45 MIN: CPT

## 2023-08-07 PROCEDURE — 99214 OFFICE O/P EST MOD 30 MIN: CPT

## 2023-08-07 RX ORDER — ALBUTEROL SULFATE 90 UG/1
108 (90 BASE) AEROSOL, METERED RESPIRATORY (INHALATION)
Qty: 1 | Refills: 5 | Status: COMPLETED | COMMUNITY
Start: 2021-08-03 | End: 2023-08-07

## 2023-08-07 RX ORDER — CETIRIZINE HYDROCHLORIDE 10 MG/1
CAPSULE, LIQUID FILLED ORAL
Refills: 0 | Status: ACTIVE | COMMUNITY

## 2023-08-07 RX ORDER — ZONISAMIDE 100 MG/1
100 CAPSULE ORAL
Qty: 360 | Refills: 3 | Status: COMPLETED | COMMUNITY
End: 2023-08-07

## 2023-08-07 RX ORDER — CHOLECALCIFEROL (VITAMIN D3) 1250 MCG
1.25 MG CAPSULE ORAL
Qty: 13 | Refills: 3 | Status: ACTIVE | COMMUNITY
Start: 2023-08-07 | End: 1900-01-01

## 2023-08-07 NOTE — PAST MEDICAL HISTORY
[Menstruating] : The patient is menstruating [Definite ___ (Date)] : the last menstrual period was [unfilled] [Less Bleeding] : the period was lighter than normal [Total Preg ___] : G[unfilled] [Live Births ___] : P[unfilled]  [Full Term ___] : Full Term: [unfilled] [Abortions ___] : Abortions:[unfilled] [Living ___] : Living: [unfilled] [Multiple Births ___] :  multiple birth pregnancies: [unfilled]

## 2023-08-07 NOTE — PHYSICAL EXAM
[Alert] : alert [Well Nourished] : well nourished [Obese] : obese [No Acute Distress] : no acute distress [Well Developed] : well developed [Normal Sclera/Conjunctiva] : normal sclera/conjunctiva [EOMI] : extra ocular movement intact [PERRL] : pupils equal, round and reactive to light [Normal Outer Ear/Nose] : the ears and nose were normal in appearance [Normal Hearing] : hearing was normal [Supple] : the neck was supple [Thyroid Not Enlarged] : the thyroid was not enlarged [No Respiratory Distress] : no respiratory distress [No Accessory Muscle Use] : no accessory muscle use [Clear to Auscultation] : lungs were clear to auscultation bilaterally [Normal S1, S2] : normal S1 and S2 [Normal Rate] : heart rate was normal [Regular Rhythm] : with a regular rhythm [No Edema] : no peripheral edema [Pedal Pulses Normal] : the pedal pulses are present [Normal Bowel Sounds] : normal bowel sounds [Not Tender] : non-tender [Not Distended] : not distended [Soft] : abdomen soft [Normal Anterior Cervical Nodes] : no anterior cervical lymphadenopathy [Normal Posterior Cervical Nodes] : no posterior cervical lymphadenopathy [No CVA Tenderness] : no ~M costovertebral angle tenderness [No Spinal Tenderness] : no spinal tenderness [Spine Straight] : spine straight [Kyphosis] : no kyphosis present [Scoliosis] : no scoliosis [No Stigmata of Cushings Syndrome] : no stigmata of Cushings Syndrome [Normal Gait] : normal gait [Normal Strength/Tone] : muscle strength and tone were normal [No Rash] : no rash [Acanthosis Nigricans] : no acanthosis nigricans [Cranial Nerves Intact] : cranial nerves 2-12 were intact [No Motor Deficits] : the motor exam was normal [Normal Reflexes] : deep tendon reflexes were 2+ and symmetric [No Tremors] : no tremors [Oriented x3] : oriented to person, place, and time [Normal Affect] : the affect was normal [Normal Mood] : the mood was normal [de-identified] : 4/21/23 thyroid nono AMDS- 8mm solid nodule [de-identified] : adrenal adenoma, TSH low, thyroid nodule, obeisty [de-identified] : h/o seizures, last 2009

## 2023-08-07 NOTE — DATA REVIEWED
[FreeTextEntry1] : aldoterone plasma renin 0.12 L, aldosterone/ Pra 50.0 H , aldosterone  6, metanephrine , free <25, cortisol 25.8 H, TSH 1.01 ft4 1.2, vit d , 25 oh 17L, glucose 90, gfr 107, tsh 0.02l , ft4 1.2, vitamin d 21L, Zonisamide 1.5L, phenytoin 6.8 L  tetosterone free 1.1, total tesotsterone 33, vit d 41, chol 166, hdl 68, hdl 62, ldl 84, dhea 26, b12 257, hgba1c 4.9

## 2023-08-07 NOTE — HISTORY OF PRESENT ILLNESS
[FreeTextEntry1] : In 2021 noted Adrenal Adenoma in CT scan and followed up by Dr. Arambula, Nephrology and refer to Endocrine. History of seizures and last was 2009. Will repeat the cortisol level, she will do the labs with Dr. Arambula including 24 hour urine, next visit. For now will follow up the hyperthyroid and will do labs and repeat the sonogram Will refer to Dr. Loera for Adrena next visit.

## 2023-08-07 NOTE — REASON FOR VISIT
[Initial Evaluation] : an initial evaluation [Adrenal Evaluation/Adrenal Disorder] : adrenal evaluation/adrenal disorder [Weight Management/Obesity] : weight management/obesity [FreeTextEntry2] : Dr. Zamora

## 2023-08-07 NOTE — ASSESSMENT
[Smoking Cessation] : smoking cessation [Weight Loss] : weight loss [FreeTextEntry4] : [address] diet, lose weight [FreeTextEntry1] : diet and exercise

## 2023-08-23 ENCOUNTER — NON-APPOINTMENT (OUTPATIENT)
Age: 53
End: 2023-08-23

## 2023-10-12 ENCOUNTER — APPOINTMENT (OUTPATIENT)
Dept: ENDOCRINOLOGY | Facility: CLINIC | Age: 53
End: 2023-10-12
Payer: COMMERCIAL

## 2023-10-12 VITALS
OXYGEN SATURATION: 98 % | WEIGHT: 203 LBS | HEART RATE: 74 BPM | SYSTOLIC BLOOD PRESSURE: 124 MMHG | RESPIRATION RATE: 18 BRPM | DIASTOLIC BLOOD PRESSURE: 76 MMHG | BODY MASS INDEX: 34.66 KG/M2 | HEIGHT: 64 IN

## 2023-10-12 DIAGNOSIS — D35.00 BENIGN NEOPLASM OF UNSPECIFIED ADRENAL GLAND: ICD-10-CM

## 2023-10-12 DIAGNOSIS — E55.9 VITAMIN D DEFICIENCY, UNSPECIFIED: ICD-10-CM

## 2023-10-12 PROCEDURE — 99214 OFFICE O/P EST MOD 30 MIN: CPT

## 2023-11-14 ENCOUNTER — APPOINTMENT (OUTPATIENT)
Dept: ENDOCRINOLOGY | Facility: CLINIC | Age: 53
End: 2023-11-14

## 2023-11-23 ENCOUNTER — INPATIENT (INPATIENT)
Facility: HOSPITAL | Age: 53
LOS: 1 days | Discharge: LEFT BEFORE DISCHARGE | DRG: 101 | End: 2023-11-25
Attending: STUDENT IN AN ORGANIZED HEALTH CARE EDUCATION/TRAINING PROGRAM | Admitting: STUDENT IN AN ORGANIZED HEALTH CARE EDUCATION/TRAINING PROGRAM
Payer: COMMERCIAL

## 2023-11-23 VITALS
TEMPERATURE: 98 F | HEART RATE: 104 BPM | RESPIRATION RATE: 18 BRPM | OXYGEN SATURATION: 93 % | DIASTOLIC BLOOD PRESSURE: 68 MMHG | SYSTOLIC BLOOD PRESSURE: 132 MMHG | WEIGHT: 199.96 LBS

## 2023-11-23 DIAGNOSIS — Z87.09 PERSONAL HISTORY OF OTHER DISEASES OF THE RESPIRATORY SYSTEM: ICD-10-CM

## 2023-11-23 DIAGNOSIS — Z90.721 ACQUIRED ABSENCE OF OVARIES, UNILATERAL: Chronic | ICD-10-CM

## 2023-11-23 DIAGNOSIS — Z90.79 ACQUIRED ABSENCE OF OTHER GENITAL ORGAN(S): Chronic | ICD-10-CM

## 2023-11-23 LAB
ALBUMIN SERPL ELPH-MCNC: 4 G/DL — SIGNIFICANT CHANGE UP (ref 3.5–5.2)
ALBUMIN SERPL ELPH-MCNC: 4 G/DL — SIGNIFICANT CHANGE UP (ref 3.5–5.2)
ALP SERPL-CCNC: 208 U/L — HIGH (ref 30–115)
ALP SERPL-CCNC: 208 U/L — HIGH (ref 30–115)
ALT FLD-CCNC: 30 U/L — SIGNIFICANT CHANGE UP (ref 0–41)
ALT FLD-CCNC: 30 U/L — SIGNIFICANT CHANGE UP (ref 0–41)
ANION GAP SERPL CALC-SCNC: 10 MMOL/L — SIGNIFICANT CHANGE UP (ref 7–14)
ANION GAP SERPL CALC-SCNC: 10 MMOL/L — SIGNIFICANT CHANGE UP (ref 7–14)
APPEARANCE UR: ABNORMAL
APPEARANCE UR: ABNORMAL
AST SERPL-CCNC: 24 U/L — SIGNIFICANT CHANGE UP (ref 0–41)
AST SERPL-CCNC: 24 U/L — SIGNIFICANT CHANGE UP (ref 0–41)
BASOPHILS # BLD AUTO: 0.04 K/UL — SIGNIFICANT CHANGE UP (ref 0–0.2)
BASOPHILS # BLD AUTO: 0.04 K/UL — SIGNIFICANT CHANGE UP (ref 0–0.2)
BASOPHILS NFR BLD AUTO: 0.3 % — SIGNIFICANT CHANGE UP (ref 0–1)
BASOPHILS NFR BLD AUTO: 0.3 % — SIGNIFICANT CHANGE UP (ref 0–1)
BILIRUB SERPL-MCNC: 0.2 MG/DL — SIGNIFICANT CHANGE UP (ref 0.2–1.2)
BILIRUB SERPL-MCNC: 0.2 MG/DL — SIGNIFICANT CHANGE UP (ref 0.2–1.2)
BILIRUB UR-MCNC: NEGATIVE — SIGNIFICANT CHANGE UP
BILIRUB UR-MCNC: NEGATIVE — SIGNIFICANT CHANGE UP
BUN SERPL-MCNC: 13 MG/DL — SIGNIFICANT CHANGE UP (ref 10–20)
BUN SERPL-MCNC: 13 MG/DL — SIGNIFICANT CHANGE UP (ref 10–20)
CALCIUM SERPL-MCNC: 9.1 MG/DL — SIGNIFICANT CHANGE UP (ref 8.4–10.5)
CALCIUM SERPL-MCNC: 9.1 MG/DL — SIGNIFICANT CHANGE UP (ref 8.4–10.5)
CHLORIDE SERPL-SCNC: 102 MMOL/L — SIGNIFICANT CHANGE UP (ref 98–110)
CHLORIDE SERPL-SCNC: 102 MMOL/L — SIGNIFICANT CHANGE UP (ref 98–110)
CO2 SERPL-SCNC: 22 MMOL/L — SIGNIFICANT CHANGE UP (ref 17–32)
CO2 SERPL-SCNC: 22 MMOL/L — SIGNIFICANT CHANGE UP (ref 17–32)
COLOR SPEC: YELLOW — SIGNIFICANT CHANGE UP
COLOR SPEC: YELLOW — SIGNIFICANT CHANGE UP
CREAT SERPL-MCNC: 0.5 MG/DL — LOW (ref 0.7–1.5)
CREAT SERPL-MCNC: 0.5 MG/DL — LOW (ref 0.7–1.5)
DIFF PNL FLD: NEGATIVE — SIGNIFICANT CHANGE UP
DIFF PNL FLD: NEGATIVE — SIGNIFICANT CHANGE UP
EGFR: 113 ML/MIN/1.73M2 — SIGNIFICANT CHANGE UP
EGFR: 113 ML/MIN/1.73M2 — SIGNIFICANT CHANGE UP
EOSINOPHIL # BLD AUTO: 0.01 K/UL — SIGNIFICANT CHANGE UP (ref 0–0.7)
EOSINOPHIL # BLD AUTO: 0.01 K/UL — SIGNIFICANT CHANGE UP (ref 0–0.7)
EOSINOPHIL NFR BLD AUTO: 0.1 % — SIGNIFICANT CHANGE UP (ref 0–8)
EOSINOPHIL NFR BLD AUTO: 0.1 % — SIGNIFICANT CHANGE UP (ref 0–8)
GAS PNL BLDV: SIGNIFICANT CHANGE UP
GAS PNL BLDV: SIGNIFICANT CHANGE UP
GLUCOSE SERPL-MCNC: 110 MG/DL — HIGH (ref 70–99)
GLUCOSE SERPL-MCNC: 110 MG/DL — HIGH (ref 70–99)
GLUCOSE UR QL: NEGATIVE MG/DL — SIGNIFICANT CHANGE UP
GLUCOSE UR QL: NEGATIVE MG/DL — SIGNIFICANT CHANGE UP
HCT VFR BLD CALC: 41.4 % — SIGNIFICANT CHANGE UP (ref 37–47)
HCT VFR BLD CALC: 41.4 % — SIGNIFICANT CHANGE UP (ref 37–47)
HGB BLD-MCNC: 14 G/DL — SIGNIFICANT CHANGE UP (ref 12–16)
HGB BLD-MCNC: 14 G/DL — SIGNIFICANT CHANGE UP (ref 12–16)
IMM GRANULOCYTES NFR BLD AUTO: 0.3 % — SIGNIFICANT CHANGE UP (ref 0.1–0.3)
IMM GRANULOCYTES NFR BLD AUTO: 0.3 % — SIGNIFICANT CHANGE UP (ref 0.1–0.3)
KETONES UR-MCNC: NEGATIVE MG/DL — SIGNIFICANT CHANGE UP
KETONES UR-MCNC: NEGATIVE MG/DL — SIGNIFICANT CHANGE UP
LACTATE SERPL-SCNC: 0.9 MMOL/L — SIGNIFICANT CHANGE UP (ref 0.7–2)
LACTATE SERPL-SCNC: 0.9 MMOL/L — SIGNIFICANT CHANGE UP (ref 0.7–2)
LEUKOCYTE ESTERASE UR-ACNC: NEGATIVE — SIGNIFICANT CHANGE UP
LEUKOCYTE ESTERASE UR-ACNC: NEGATIVE — SIGNIFICANT CHANGE UP
LYMPHOCYTES # BLD AUTO: 16.4 % — LOW (ref 20.5–51.1)
LYMPHOCYTES # BLD AUTO: 16.4 % — LOW (ref 20.5–51.1)
LYMPHOCYTES # BLD AUTO: 2 K/UL — SIGNIFICANT CHANGE UP (ref 1.2–3.4)
LYMPHOCYTES # BLD AUTO: 2 K/UL — SIGNIFICANT CHANGE UP (ref 1.2–3.4)
MAGNESIUM SERPL-MCNC: 2.4 MG/DL — SIGNIFICANT CHANGE UP (ref 1.8–2.4)
MAGNESIUM SERPL-MCNC: 2.4 MG/DL — SIGNIFICANT CHANGE UP (ref 1.8–2.4)
MCHC RBC-ENTMCNC: 29.9 PG — SIGNIFICANT CHANGE UP (ref 27–31)
MCHC RBC-ENTMCNC: 29.9 PG — SIGNIFICANT CHANGE UP (ref 27–31)
MCHC RBC-ENTMCNC: 33.8 G/DL — SIGNIFICANT CHANGE UP (ref 32–37)
MCHC RBC-ENTMCNC: 33.8 G/DL — SIGNIFICANT CHANGE UP (ref 32–37)
MCV RBC AUTO: 88.5 FL — SIGNIFICANT CHANGE UP (ref 81–99)
MCV RBC AUTO: 88.5 FL — SIGNIFICANT CHANGE UP (ref 81–99)
MONOCYTES # BLD AUTO: 0.64 K/UL — HIGH (ref 0.1–0.6)
MONOCYTES # BLD AUTO: 0.64 K/UL — HIGH (ref 0.1–0.6)
MONOCYTES NFR BLD AUTO: 5.3 % — SIGNIFICANT CHANGE UP (ref 1.7–9.3)
MONOCYTES NFR BLD AUTO: 5.3 % — SIGNIFICANT CHANGE UP (ref 1.7–9.3)
NEUTROPHILS # BLD AUTO: 9.43 K/UL — HIGH (ref 1.4–6.5)
NEUTROPHILS # BLD AUTO: 9.43 K/UL — HIGH (ref 1.4–6.5)
NEUTROPHILS NFR BLD AUTO: 77.6 % — HIGH (ref 42.2–75.2)
NEUTROPHILS NFR BLD AUTO: 77.6 % — HIGH (ref 42.2–75.2)
NITRITE UR-MCNC: NEGATIVE — SIGNIFICANT CHANGE UP
NITRITE UR-MCNC: NEGATIVE — SIGNIFICANT CHANGE UP
NRBC # BLD: 0 /100 WBCS — SIGNIFICANT CHANGE UP (ref 0–0)
NRBC # BLD: 0 /100 WBCS — SIGNIFICANT CHANGE UP (ref 0–0)
PH UR: 6 — SIGNIFICANT CHANGE UP (ref 5–8)
PH UR: 6 — SIGNIFICANT CHANGE UP (ref 5–8)
PHENYTOIN FREE SERPL-MCNC: 3.4 UG/ML — LOW (ref 10–20)
PHENYTOIN FREE SERPL-MCNC: 3.4 UG/ML — LOW (ref 10–20)
PHOSPHATE SERPL-MCNC: 3.7 MG/DL — SIGNIFICANT CHANGE UP (ref 2.1–4.9)
PHOSPHATE SERPL-MCNC: 3.7 MG/DL — SIGNIFICANT CHANGE UP (ref 2.1–4.9)
PLATELET # BLD AUTO: 222 K/UL — SIGNIFICANT CHANGE UP (ref 130–400)
PLATELET # BLD AUTO: 222 K/UL — SIGNIFICANT CHANGE UP (ref 130–400)
PMV BLD: 10.5 FL — HIGH (ref 7.4–10.4)
PMV BLD: 10.5 FL — HIGH (ref 7.4–10.4)
POTASSIUM SERPL-MCNC: 4.2 MMOL/L — SIGNIFICANT CHANGE UP (ref 3.5–5)
POTASSIUM SERPL-MCNC: 4.2 MMOL/L — SIGNIFICANT CHANGE UP (ref 3.5–5)
POTASSIUM SERPL-SCNC: 4.2 MMOL/L — SIGNIFICANT CHANGE UP (ref 3.5–5)
POTASSIUM SERPL-SCNC: 4.2 MMOL/L — SIGNIFICANT CHANGE UP (ref 3.5–5)
PROT SERPL-MCNC: 6.4 G/DL — SIGNIFICANT CHANGE UP (ref 6–8)
PROT SERPL-MCNC: 6.4 G/DL — SIGNIFICANT CHANGE UP (ref 6–8)
PROT UR-MCNC: NEGATIVE MG/DL — SIGNIFICANT CHANGE UP
PROT UR-MCNC: NEGATIVE MG/DL — SIGNIFICANT CHANGE UP
RBC # BLD: 4.68 M/UL — SIGNIFICANT CHANGE UP (ref 4.2–5.4)
RBC # BLD: 4.68 M/UL — SIGNIFICANT CHANGE UP (ref 4.2–5.4)
RBC # FLD: 12.5 % — SIGNIFICANT CHANGE UP (ref 11.5–14.5)
RBC # FLD: 12.5 % — SIGNIFICANT CHANGE UP (ref 11.5–14.5)
SODIUM SERPL-SCNC: 134 MMOL/L — LOW (ref 135–146)
SODIUM SERPL-SCNC: 134 MMOL/L — LOW (ref 135–146)
SP GR SPEC: 1.01 — SIGNIFICANT CHANGE UP (ref 1–1.03)
SP GR SPEC: 1.01 — SIGNIFICANT CHANGE UP (ref 1–1.03)
UROBILINOGEN FLD QL: 0.2 MG/DL — SIGNIFICANT CHANGE UP (ref 0.2–1)
UROBILINOGEN FLD QL: 0.2 MG/DL — SIGNIFICANT CHANGE UP (ref 0.2–1)
WBC # BLD: 12.16 K/UL — HIGH (ref 4.8–10.8)
WBC # BLD: 12.16 K/UL — HIGH (ref 4.8–10.8)
WBC # FLD AUTO: 12.16 K/UL — HIGH (ref 4.8–10.8)
WBC # FLD AUTO: 12.16 K/UL — HIGH (ref 4.8–10.8)

## 2023-11-23 PROCEDURE — 85025 COMPLETE CBC W/AUTO DIFF WBC: CPT

## 2023-11-23 PROCEDURE — 83735 ASSAY OF MAGNESIUM: CPT

## 2023-11-23 PROCEDURE — 95700 EEG CONT REC W/VID EEG TECH: CPT

## 2023-11-23 PROCEDURE — 80185 ASSAY OF PHENYTOIN TOTAL: CPT

## 2023-11-23 PROCEDURE — 95716 VEEG EA 12-26HR CONT MNTR: CPT

## 2023-11-23 PROCEDURE — 36415 COLL VENOUS BLD VENIPUNCTURE: CPT

## 2023-11-23 PROCEDURE — 73660 X-RAY EXAM OF TOE(S): CPT | Mod: 26,LT

## 2023-11-23 PROCEDURE — 31575 DIAGNOSTIC LARYNGOSCOPY: CPT

## 2023-11-23 PROCEDURE — 82962 GLUCOSE BLOOD TEST: CPT

## 2023-11-23 PROCEDURE — 99285 EMERGENCY DEPT VISIT HI MDM: CPT

## 2023-11-23 PROCEDURE — 70450 CT HEAD/BRAIN W/O DYE: CPT | Mod: 26,MA

## 2023-11-23 PROCEDURE — 80053 COMPREHEN METABOLIC PANEL: CPT

## 2023-11-23 PROCEDURE — 93010 ELECTROCARDIOGRAM REPORT: CPT

## 2023-11-23 RX ORDER — ZONISAMIDE 100 MG
200 CAPSULE ORAL
Qty: 0 | Refills: 0 | DISCHARGE

## 2023-11-23 RX ORDER — ONDANSETRON 8 MG/1
4 TABLET, FILM COATED ORAL EVERY 8 HOURS
Refills: 0 | Status: DISCONTINUED | OUTPATIENT
Start: 2023-11-23 | End: 2023-11-25

## 2023-11-23 RX ORDER — PANTOPRAZOLE SODIUM 20 MG/1
40 TABLET, DELAYED RELEASE ORAL
Refills: 0 | Status: DISCONTINUED | OUTPATIENT
Start: 2023-11-23 | End: 2023-11-25

## 2023-11-23 RX ORDER — LANOLIN ALCOHOL/MO/W.PET/CERES
3 CREAM (GRAM) TOPICAL AT BEDTIME
Refills: 0 | Status: DISCONTINUED | OUTPATIENT
Start: 2023-11-23 | End: 2023-11-25

## 2023-11-23 RX ORDER — ACETAMINOPHEN 500 MG
650 TABLET ORAL EVERY 6 HOURS
Refills: 0 | Status: DISCONTINUED | OUTPATIENT
Start: 2023-11-23 | End: 2023-11-25

## 2023-11-23 RX ORDER — LEVETIRACETAM 250 MG/1
1000 TABLET, FILM COATED ORAL ONCE
Refills: 0 | Status: COMPLETED | OUTPATIENT
Start: 2023-11-23 | End: 2023-11-23

## 2023-11-23 RX ADMIN — Medication 1000 MILLIGRAM(S): at 22:36

## 2023-11-23 RX ADMIN — Medication 540 MILLIGRAM(S): at 21:49

## 2023-11-23 RX ADMIN — Medication 212 MILLIGRAM(S): at 19:19

## 2023-11-23 NOTE — ED PROVIDER NOTE - PHYSICAL EXAMINATION
CONSTITUTIONAL: well-appearing, well nourished, non-toxic, NAD  SKIN: Warm dry, normal skin turgor  HEAD: NCAT  EYES: EOMI, PERRLA b/l, no scleral icterus, +Right sided nystagmus, exhaustible   ENT: Moist mucous membranes, enlarged tonsils b/l, bite marks b/l on tongue  NECK: Supple; non tender. Full ROM.  CARD: RRR, no murmurs, rubs or gallops  RESP: clear to ausculation b/l.  No rales, rhonchi, or wheezing.  EXT: Full ROM, no pedal edema, no calf tenderness, + left 1st digit ecchymosis and tenderness.  NEURO: normal motor. normal sensory. Cerebellar testing normal. Normal gait, -Pronator drift, -heel to shin, -finger to nose  PSYCH: Cooperative, appropriate.

## 2023-11-23 NOTE — ED PROVIDER NOTE - ATTENDING CONTRIBUTION TO CARE
53-year-old female past medical history as above presents with seizure.  Has been maintained on Dilantin 200 presented hospital  Has history of a single.    Exam as above CT repeat electrolytes neuroconsult 53-year-old female past medical history as above presents with seizure.  Has been maintained on Dilantin 200  Presented hospital x 3 times seizure in the past 24 hours,     Exam as above CT repeat electrolytes neuroconsult concern for status

## 2023-11-23 NOTE — ED PROVIDER NOTE - OBJECTIVE STATEMENT
53y F with history of epilepsy and pancreatic enlargement presents for 3 episodes of seizures since last night.  Patient has not had seizures.  Patient has been maintained on Dilantin 200mg in the morning and 300 mg at night.  Patient was at a basketball game last night when she experienced a seizure while sitting down.  Patient felt it coming on, so she laid down and had a myoclonic seizure for 2 minutes with incontinence.  Patient had tongue biting as well.  She went to [facility] and was cleared.  Patient took her normal morning dose but still experienced seizure after coming out of the shower this morning and she laid on the bathroom ground experienced another seizure for 1–2 minutes without incontinence.  Patient experienced another seizure when in the car with her son.  The car turned over so she can lay down and ambulance was called.  Patient states she injured her left big toe while convulsing.    3 and a half weeks ago patient was diagnosed with hyperthyroidism and was prescribed methimazole which she is no longer taking.  Patient developed a pharyngitis for which she was placed on antibiotics, followed by an pneumonia for which she was given amoxicillin and prednisone.  Patient then developed laryngitis was increased on her dose of prednisone.  Patient's voice has been decreasing.  An ENT used a laryngoscope and determined she had swelling in her vocal cords and tonsils.    Patient states her cough has improved denies fever, chills, shortness of breath, chest pain, abdominal pain, urinary symptoms, diarrhea, constipation, nausea, vomiting, numbness or tingling. 53y F with history of epilepsy and pancreatic enlargement presents for 3 episodes of seizures since last night.  Patient has not had seizures.  Patient has been maintained on Dilantin 200mg in the morning and 300 mg at night.  Patient was at a basketball game last night when she experienced a seizure while sitting down.  Patient felt it coming on, so she laid down and had a myoclonic seizure for 2 minutes with incontinence.  Patient had tongue biting as well.  She went to [facility] and was cleared.  Patient took her normal morning dose but still experienced seizure after coming out of the shower this morning and she laid on the bathroom ground experienced another seizure for 1–2 minutes without incontinence.  Patient experienced another seizure when in the car with her son.  The car turned to the side so she can lay down and ambulance was called.  Patient states she injured her left big toe while convulsing. Pt's normal Dilantin levels are lower than normal, but stay around 5 ug/mL    3 and a half weeks ago patient was diagnosed with hyperthyroidism and was prescribed methimazole which she is no longer taking.  Patient developed a pharyngitis for which she was placed on antibiotics, followed by an pneumonia for which she was given amoxicillin and prednisone.  Patient then developed laryngitis was increased on her dose of prednisone.  Patient's voice has been decreasing.  An ENT used a laryngoscope and determined she had swelling in her vocal cords and tonsils.    Patient states her cough has improved denies fever, chills, shortness of breath, chest pain, abdominal pain, urinary symptoms, diarrhea, constipation, nausea, vomiting, numbness or tingling.

## 2023-11-23 NOTE — ED ADULT TRIAGE NOTE - ESI TRIAGE ACUITY LEVEL, MLM
weakness weakness weakness weakness weakness weakness weakness weakness 7/26/21 s/p C2-4 posterior decompression and fusion for cervical stenosis and myelopathy. weakness weakness weakness Admitted 7/25 for b/l UE and LE weakness found with severe C2-3 canal stenosis on MRI; 7/26 s/p C2-4 instrumentation and fusion Admitted 7/25 for b/l UE and LE weakness found with severe C2-3 canal stenosis on MRI; 7/26 s/p C2-4 instrumentation and fusion weakness weakness 3

## 2023-11-23 NOTE — CONSULT NOTE ADULT - SUBJECTIVE AND OBJECTIVE BOX
HPI:  53 year old female with PMH of Seizure disorder, Hyperthyroidism presented to ED after 3 episodes of seizures since yesterday. She didn't have any seizure in past 20 years.   Patient was at a basketball game last night when she experienced a seizure while sitting down.  Patient felt it coming on, so she laid down and had a myoclonic seizure for 2 minutes with incontinence.  Patient had tongue biting as well.  She went to [facility] and was cleared.  Patient took her normal morning dose but still experienced seizure after coming out of the shower this morning and she laid on the bathroom ground experienced another seizure for 1–2 minutes without incontinence.  Patient experienced another seizure when in the car with her son.  The car turned over so she can lay down and ambulance was called.  Patient states she injured her left big toe while convulsing.  3 and a half weeks ago patient was diagnosed with hyperthyroidism and was prescribed methimazole which she is no longer taking.  Patient developed a pharyngitis for which she was placed on antibiotics, followed by an pneumonia for which she was given amoxicillin and prednisone.  Patient then developed laryngitis was increased on her dose of prednisone.  Patient's voice has been decreasing.  An ENT used a laryngoscope and determined she had swelling in her vocal cords and tonsils.      Patient followed with Dr. Felder sporadically, last visit was in 2022. As per note from that visit, patient supposed to be on Dilantin 200+300 mg , and Zonisamide 100 mg QHS. Patient currently only on Dilantin. Her seizure is not characterized, one REEG in chart from 10/2020 reported as normal.         PAST MEDICAL & SURGICAL HISTORY:  History of epilepsy  Pulmonary nodule  Cervical herniated disc  Pancreatic enlargement  tail of pancreas enlarged/inflammed  H/O bilateral salpingectomy  H/O unilateral oophorectomy        Medications:  Dilantin 200+300 mg      Vital Signs Last 24 Hrs  T(C): 36.4 (2023 16:12), Max: 36.4 (2023 16:12)  T(F): 97.6 (2023 16:12), Max: 97.6 (2023 16:12)  HR: 104 (2023 16:12) (104 - 104)  BP: 132/68 (2023 16:12) (132/68 - 132/68)  BP(mean): --  RR: 18 (2023 16:12) (18 - 18)  SpO2: 93% (2023 16:12) (93% - 93%)    Parameters below as of 2023 16:12  Patient On (Oxygen Delivery Method): room air        Neurological Exam:   Mental status: Awake, alert and oriented x3.  Recent and remote memory intact.  Naming, repetition and comprehension intact.  Attention/concentration intact.  No dysarthria, no aphasia.  Fund of knowledge appropriate.    Cranial nerves: Pupils equally round and reactive to light, visual fields full, no nystagmus, extraocular muscles intact, V1 through V3 intact bilaterally and symmetric, face symmetric, hearing intact to finger rub, palate elevation symmetric, tongue was midline.  Motor:  MRC grading 5/5 b/l UE/LE.   strength 5/5.  Normal tone and bulk.  No abnormal movements.    Sensation: Intact to light touch, proprioception, and pinprick.   Coordination: No dysmetria on finger-to-nose and heel-to-shin.  No dysdiadokinesia.  Reflexes: 2+ in bilateral UE/LE, downgoing toes bilaterally. (-) Hanna.  Gait: Narrow and steady. No ataxia.  Romberg negative    Labs:  Fingerstick Blood Glucose: CAPILLARY BLOOD GLUCOSE      POCT Blood Glucose.: 107 mg/dL (2023 16:16)    LABS:                        14.0   12.16 )-----------( 222      ( 2023 18:03 )             41.4     11-23    134<L>  |  102  |  13  ----------------------------<  110<H>  4.2   |  22  |  0.5<L>    Ca    9.1      2023 18:03  Phos  3.7     11-  Mg     2.4     11-23    TPro  6.4  /  Alb  4.0  /  TBili  0.2  /  DBili  x   /  AST  24  /  ALT  30  /  AlkPhos  208<H>  11-23          Urinalysis Basic - ( 2023 19:43 )    Color: Yellow / Appearance: Cloudy / S.009 / pH: x  Gluc: x / Ketone: Negative mg/dL  / Bili: Negative / Urobili: 0.2 mg/dL   Blood: x / Protein: Negative mg/dL / Nitrite: Negative   Leuk Esterase: Negative / RBC: 0 /HPF / WBC 3 /HPF   Sq Epi: x / Non Sq Epi: 13 /HPF / Bacteria: Moderate /HPF         HPI:  53 year old female with PMH of Seizure disorder, Hyperthyroidism presented to ED after 3 episodes of seizures since yesterday. She didn't have any seizure in past 20 years.   Patient was at a basketball game last night when she experienced a seizure while sitting down.  Patient felt it coming on, so she laid down and had a myoclonic seizure for 2 minutes with incontinence.  Patient had tongue biting as well.  She went to [facility] and was cleared.  Patient took her normal morning dose but still experienced seizure after coming out of the shower this morning and she laid on the bathroom ground experienced another seizure for 1–2 minutes without incontinence.  Patient experienced another seizure when in the car with her son.  The car turned over so she can lay down and ambulance was called.  Patient states she injured her left big toe while convulsing.    3 and a half weeks ago patient was diagnosed with hyperthyroidism and was prescribed methimazole which she is no longer taking.  Patient developed a pharyngitis for which she was placed on antibiotics, followed by an pneumonia for which she was given amoxicillin and prednisone.  Patient then developed laryngitis was increased on her dose of prednisone.  Patient's voice has been decreasing.  An ENT used a laryngoscope and determined she had swelling in her vocal cords and tonsils.      Patient followed with Dr. Felder sporadically, last visit was in 2022. As per note from that visit, patient supposed to be on Dilantin 200+300 mg , and Zonisamide 100 mg QHS. Patient currently only on Dilantin. Her seizure is not characterized, one REEG in chart from 10/2020 reported as normal.         PAST MEDICAL & SURGICAL HISTORY:  History of epilepsy  Pulmonary nodule  Cervical herniated disc  Pancreatic enlargement  tail of pancreas enlarged/inflammed  H/O bilateral salpingectomy  H/O unilateral oophorectomy        Medications:  Dilantin 200+300 mg      Vital Signs Last 24 Hrs  T(C): 36.4 (2023 16:12), Max: 36.4 (2023 16:12)  T(F): 97.6 (2023 16:12), Max: 97.6 (2023 16:12)  HR: 104 (2023 16:12) (104 - 104)  BP: 132/68 (2023 16:12) (132/68 - 132/68)  BP(mean): --  RR: 18 (2023 16:12) (18 - 18)  SpO2: 93% (2023 16:12) (93% - 93%)    Parameters below as of 2023 16:12  Patient On (Oxygen Delivery Method): room air        Neurological Exam:   Mental status: Awake, alert and oriented x3.  Recent and remote memory intact.  Naming, repetition and comprehension intact.  Attention/concentration intact.  No dysarthria, no aphasia.  Fund of knowledge appropriate.    Cranial nerves: Pupils equally round and reactive to light, visual fields full, no nystagmus, extraocular muscles intact, V1 through V3 intact bilaterally and symmetric, face symmetric, hearing intact to finger rub, palate elevation symmetric, tongue was midline.  Motor:  MRC grading 5/5 b/l UE/LE.   strength 5/5.  Normal tone and bulk.  No abnormal movements.    Sensation: Intact to light touch, proprioception, and pinprick.   Coordination: No dysmetria on finger-to-nose and heel-to-shin.  No dysdiadokinesia.  Reflexes: 2+ in bilateral UE/LE, downgoing toes bilaterally. (-) Hanna.  Gait: Narrow and steady. No ataxia.  Romberg negative    Labs:  Fingerstick Blood Glucose: CAPILLARY BLOOD GLUCOSE      POCT Blood Glucose.: 107 mg/dL (2023 16:16)    LABS:                        14.0   12.16 )-----------( 222      ( 2023 18:03 )             41.4     11-23    134<L>  |  102  |  13  ----------------------------<  110<H>  4.2   |  22  |  0.5<L>    Ca    9.1      2023 18:03  Phos  3.7     11-  Mg     2.4     11-23    TPro  6.4  /  Alb  4.0  /  TBili  0.2  /  DBili  x   /  AST  24  /  ALT  30  /  AlkPhos  208<H>  11-23          Urinalysis Basic - ( 2023 19:43 )    Color: Yellow / Appearance: Cloudy / S.009 / pH: x  Gluc: x / Ketone: Negative mg/dL  / Bili: Negative / Urobili: 0.2 mg/dL   Blood: x / Protein: Negative mg/dL / Nitrite: Negative   Leuk Esterase: Negative / RBC: 0 /HPF / WBC 3 /HPF   Sq Epi: x / Non Sq Epi: 13 /HPF / Bacteria: Moderate /HPF

## 2023-11-23 NOTE — CONSULT NOTE ADULT - PROBLEM SELECTOR RECOMMENDATION 9
Recommend Omeprazole 20mg every 12 hrs  Pt should call our ENT office tomorrow morning to be seen in office tomorrow.  If symptoms worsen, if have difficulty breathing or fever greater than 101 F, return to the ED  Follow up instructions with phone number and address to be given to the patient.

## 2023-11-23 NOTE — H&P ADULT - HISTORY OF PRESENT ILLNESS
53 year old female with PMH of Seizure disorder, Hyperthyroidism presented to ED after 3 episodes of seizures since yesterday. She didn't have any seizure in past 20 years.   Patient was at a basketball game last night when she experienced a seizure while sitting down.  Patient felt it coming on, so she laid down and had a myoclonic seizure for 2 minutes with incontinence.  Patient had tongue biting as well.  She went to [facility] and was cleared.  Patient took her normal morning dose but still experienced seizure after coming out of the shower this morning and she laid on the bathroom ground experienced another seizure for 1–2 minutes without incontinence.  Patient experienced another seizure when in the car with her son.  The car turned over so she can lay down and ambulance was called.  Patient states she injured her left big toe while convulsing.  3 and a half weeks ago patient was diagnosed with hyperthyroidism and was prescribed methimazole which she is no longer taking.  Patient developed a pharyngitis for which she was placed on antibiotics, followed by an pneumonia for which she was given amoxicillin and prednisone.  Patient then developed laryngitis was increased on her dose of prednisone.  Patient's voice has been decreasing.  An ENT used a laryngoscope and determined she had swelling in her vocal cords and tonsils.   Denies fever, chills, shortness of breath, chest pain, abdominal pain, urinary symptoms, diarrhea, constipation, nausea, vomiting, numbness or tingling.    Patient followed with Dr. Felder sporadically, last visit was in 08/2022. As per note from that visit, patient supposed to be on Dilantin 200+300 mg , and Zonisamide 100 mg QHS. Patient currently only on Dilantin. Her seizure is not characterized, one REEG in chart from 10/2020 reported as normal.     #ED Vitals:  T(C): 36.4 (11-23-23 @ 16:12), Max: 36.4 (11-23-23 @ 16:12)  HR: 104 (11-23-23 @ 16:12) (104 - 104)  BP: 132/68 (11-23-23 @ 16:12) (132/68 - 132/68)  RR: 18 (11-23-23 @ 16:12) (18 - 18)  SpO2: 93% (11-23-23 @ 16:12) (93% - 93%)    #Labs significant for: WBC 12.16k, , Serum phenytoin level 3.4(low)    #Imaging:  CT Head No Cont (11.23.23 @ 20:06): No CT evidence of acute intracranial pathology.    #S/P: Keppra 1gm IV x 1, Dilantin 1.3 gm IV x 1 in the ED.    The patient was evaluated by neurology team in the ED.  The patient is being admitted for te further management.

## 2023-11-23 NOTE — ED ADULT TRIAGE NOTE - CHIEF COMPLAINT QUOTE
BIBEMS from home for witnessed seizures x2 today, last episode was ~1hour PTA. Has h/o seizures on dilantin, recently on prednisone for laryngitis. No seizures en route.

## 2023-11-23 NOTE — ED ADULT NURSE NOTE - OBJECTIVE STATEMENT
pt came in c/o seizures 3x since yesterday. was at [facility] last night and was dc. has pharyngitis for the past 3 weeks and lost her voice. has hx of seizures and is taking meds for it. has not had a full body seizure like today's episode since 20 years ago. last one happened in son's car when coming to the hospital.

## 2023-11-23 NOTE — H&P ADULT - NSHPLABSRESULTS_GEN_ALL_CORE
LABS:                         14.0   12.16 )-----------( 222      ( 2023 18:03 )             41.4         134<L>  |  102  |  13  ----------------------------<  110<H>  4.2   |  22  |  0.5<L>    Ca    9.1      2023 18:03  Phos  3.7       Mg     2.4         TPro  6.4  /  Alb  4.0  /  TBili  0.2  /  DBili  x   /  AST  24  /  ALT  30  /  AlkPhos  208<H>        Urinalysis Basic - ( 2023 19:43 )    Color: Yellow / Appearance: Cloudy / S.009 / pH: x  Gluc: x / Ketone: Negative mg/dL  / Bili: Negative / Urobili: 0.2 mg/dL   Blood: x / Protein: Negative mg/dL / Nitrite: Negative   Leuk Esterase: Negative / RBC: 0 /HPF / WBC 3 /HPF   Sq Epi: x / Non Sq Epi: 13 /HPF / Bacteria: Moderate /HPF            Lactate, Blood: 0.9 mmol/L ( @ 18:03)      RADIOLOGY, EKG & ADDITIONAL TESTS: Reviewed.

## 2023-11-23 NOTE — ED ADULT NURSE NOTE - PRO INTERPRETER NEED 2
Name:  Orly Velazquez  :  2018  Age:  3 y.o.  Date of Evaluation:  2021      HISTORY    Reason for visit:  Orly Velazquez is seen today for a hearing test at the request of Dr. Mack Ballesteros.  Patient's mother reports she has been getting ear infections.  She states she pulls at her ears and screams, and she has fluid in both ears.  She states she hasn't had tubes In her ears yet, but she will be getting tubes on 2021.  She states she has to raise her voice in order for Orly to respond.  She states she doesn't talk much, and her sounds are not clear, and she is currently In speech therapy.      EVALUATION    See Audiogram      RESULTS:    Otoscopy and Tympanometry 226 Hz :  Right Ear:  Otoscopy:  Visible ear drum          Tympanometry:  Reduced pressure and compliance     Left Ear:   Otoscopy:  Visible ear drum        Tympanometry:  Reduced pressure and compliance     Test technique:  Visual Reinforcement Audiometry / Sound Field (VRA)       Pure Tone Audiometry:   Patient responded to narrow band noise at 35 dB for 1000 Hz in sound field.  Patient was active and fussy throughout testing, and therefore, other frequencies could not be evaluated at this time.  Patient localized well to both sides.      Speech Audiometry:   Speech Awareness Threshold (SAT) was observed at 30 dBHL in sound field.      Reliability:   fair    IMPRESSIONS:  1.  Tympanometry results are consistent with Reduced pressure and compliance  in both ears.  2.  Sound Field results are consistent with mild     hearing loss  for at least the better ear.        RECOMMENDATIONS:  Test results were reviewed with the parent/caregiver, and all questions were answered at this time.  It was a pleasure seeing Orly Velazquez in Audiology today.  We would be happy to do further testing or discuss these test as necessary. My thanks to Dr. Mack Ballesteros for this referral.           This document has been electronically signed by Shahana  Dimitri Shankar, MS ROSY-A on November 19, 2021 09:35 CST       Shahana Shankar, MS ROSY-A  Licensed Audiologist     English

## 2023-11-23 NOTE — ED PROVIDER NOTE - CLINICAL SUMMARY MEDICAL DECISION MAKING FREE TEXT BOX
Pt signed out to me from Dr. Morrow - 52 yo F presented to ED with seizures. Labs and EKG were ordered and reviewed.  Imaging was ordered and reviewed by me.  Appropriate medications for patient's presenting complaints were ordered and effects were reassessed.  Patient's records (prior hospital, ED visit, and/or nursing home notes if available) were reviewed.  Additional history was obtained from EMS, family, and/or PCP (where available).  Escalation to admission/observation was considered. Neurology recommending admission to [facility] at this time.

## 2023-11-23 NOTE — H&P ADULT - ATTENDING COMMENTS
Patient seen and examined at bedside independently of the residents. I read the resident's note and agree with the plan with the additions and corrections as noted below. My note supersedes the resident's note.     REVIEW OF SYSTEMS:  Negative except in HPI.     PMH: Seizure disorder, Hyperthyroidism    FHx: Reviewed. No fhx of asthma/copd, No fhx of liver and pulmonary disease. No fhx of hematological disorder.     Physical Exam:  GEN: No acute distress. Awake, Alert and oriented x 3.   Head: Atraumatic, Normocephalic.   Eye: PEERLA. No sclera icterus. EOMI.   ENT: Normal oropharynx, no thyromegaly, no mass, no lymphadenopathy. Horse voice.   LUNGS: Clear to auscultation bilaterally. No wheeze/rales/crackles.   HEART: Normal. S1/S2 present. RRR. No murmur/gallops.   ABD: Soft, non-tender, non-distended. Bowel sounds present.   EXT: No pitting edema. No erythema. No tenderness.  Integumentary: No rash, No sore, No petechia.   NEURO: CN III-XII intact. Strength: 5/5 b/l ULE. Sensory intact b/l ULE.     Vital Signs Last 24 Hrs  T(C): 36.6 (2023 22:30), Max: 36.6 (2023 22:30)  T(F): 97.9 (2023 22:30), Max: 97.9 (2023 22:30)  HR: 85 (2023 22:30) (85 - 104)  BP: 115/66 (2023 22:30) (115/66 - 132/68)  BP(mean): --  RR: 18 (2023 22:30) (18 - 18)  SpO2: 96% (2023 22:30) (93% - 96%)    Parameters below as of 2023 22:30  Patient On (Oxygen Delivery Method): room air      Please see the above notes for Labs and radiology.     Assessment and Plan:     52 yo F with hx of Seizure disorder, Hyperthyroidism presents to ED for 3 episodes of seizure since yesterday.     Seizure   - CTH: neg for acute intracranial pathology.   - Serum dilantin level 3.4  - s/p 1000mg IV STAT loading dose and Keppra x 1 in ED.   - c/w home AEDs  - Ativan 2mg IV for GTC > 5 mins.   - seizure precaution  - Transfer to [facility] for vEEG.   - f/u Neurology    Horse voice likely 2/2 laryngitis   - s/p eval by ENT in ED.   - start on PPI BID  - f/u ENT     Hyperthyroidism - check TSH. c/w methimazole.     DVT ppx: Heparin SC  GI ppx: PPI  Diet: regular diet  Activity: as tolerated.   Dispo: transfer to [facility] for vEEG.     Date seen by the attendin2023  Total time spent: 75 minutes.

## 2023-11-23 NOTE — H&P ADULT - NSICDXPASTMEDICALHX_GEN_ALL_CORE_FT
PAST MEDICAL HISTORY:  Cervical herniated disc     History of epilepsy     Hyperthyroidism     Pancreatic enlargement tail of pancreas enlarged/inflammed    Pulmonary nodule

## 2023-11-23 NOTE — H&P ADULT - ASSESSMENT
53 year old female with PMH of Seizure disorder, Hyperthyroidism presented to ED after 3 episodes of seizures since yesterday. She didn't have any seizure in past 20 years.       #Breakthrough seizure  #H/O Seizure disorder  -Patient followed with Dr. Felder sporadically, last visit was in 08/2022. As per note from that visit, patient supposed to be on Dilantin 200+300 mg , and Zonisamide 100 mg QHS. Patient currently only on Dilantin. Her seizure is not characterized, one REEG in chart from 10/2020 reported as normal.   - Serum phenytoin level 3.4(low)  -CT Head No Cont (11.23.23 @ 20:06): No CT evidence of acute intracranial pathology.  -S/P: Keppra 1gm IV x 1, Dilantin 1.3 gm IV x 1 in the ED.    Plan:  - transfer to [facility] for continuos VEEG monitoring  - Seizure precaution.  - Keep Mg>2    #Hyperthyroidism  -3 and a half weeks ago patient was diagnosed with hyperthyroidism and was prescribed methimazole which she is no longer taking.  Patient developed a pharyngitis for which she was placed on antibiotics, followed by an pneumonia for which she was given amoxicillin and prednisone.    Plan:  -f/u TSH in the AM    #MISC:  -GI ppx: None  -DVT ppx: Heparin sq  -Diet: Regular  -Activity: IAT  -Code status: Full code  -Dispo: transfer to [facility] for continuos VEEG monitoring             53 year old female with PMH of Seizure disorder, Hyperthyroidism presented to ED after 3 episodes of seizures since yesterday. She didn't have any seizure in past 20 years.       #Epilepsy complicated by breakthrough seizure  #H/O Seizure disorder  -Patient followed with Dr. Felder sporadically, last visit was in 08/2022. As per note from that visit, patient supposed to be on Dilantin 200+300 mg , and Zonisamide 100 mg QHS. Patient currently only on Dilantin. Her seizure is not characterized, one REEG in chart from 10/2020 reported as normal.   - Serum phenytoin level 3.4(low)  -CT Head No Cont (11.23.23 @ 20:06): No CT evidence of acute intracranial pathology.  -S/P: Keppra 1gm IV x 1, Dilantin 1.3 gm IV x 1 in the ED.    Plan:  - transfer to [facility] for continuos VEEG monitoring  - Seizure precaution.  - Keep Mg>2  -f/u neuro    #Hyperthyroidism  -3 and a half weeks ago(oct 30) patient was diagnosed with hyperthyroidism and was prescribed methimazole(took for 10 dyas) which she is no longer taking.  Patient developed a pharyngitis for which she was placed on antibiotics, followed by an pneumonia for which she was given amoxicillin and prednisone(tapered, last done on 11/21/23)  -Thyroid studies consistent with hyperthyroidism based on the document from the patient(TSH<0.01, fT3 6,7, Total T3 222, ; [facility] thyroid showed multiple nodules>>biopsy showed benign findings)    Plan:  -f/u TSH in the AM  -endo Follow up OP    #Larungitis  -PPI BID  -ENT F/U    #MISC:  -GI ppx: None  -DVT ppx: Heparin sq  -Diet: Regular  -Activity: IAT  -Code status: Full code  -Dispo: transfer to [facility] for continuos VEEG monitoring             53 year old female with PMH of Seizure disorder, Hyperthyroidism presented to ED after 3 episodes of seizures since yesterday. She didn't have any seizure in past 20 years.       #Epilepsy complicated by breakthrough seizure  #H/O Seizure disorder  -Patient followed with Dr. Felder sporadically, last visit was in 08/2022. As per note from that visit, patient supposed to be on Dilantin 200+300 mg , and Zonisamide 100 mg QHS. Patient currently only on Dilantin. Her seizure is not characterized, one REEG in chart from 10/2020 reported as normal.   - Serum phenytoin level 3.4(low)  -CT Head No Cont (11.23.23 @ 20:06): No CT evidence of acute intracranial pathology.  -S/P: Keppra 1gm IV x 1, Dilantin 1.3 gm IV x 1 in the ED.    Plan:  - transfer to [facility] for continuos VEEG monitoring  - Seizure precaution.  - Keep Mg>2  -f/u neuro    #Hyperthyroidism  -3 and a half weeks ago(oct 30) patient was diagnosed with hyperthyroidism and was prescribed methimazole(took for 10 dyas) which she is no longer taking.  Patient developed a pharyngitis for which she was placed on antibiotics, followed by an pneumonia for which she was given amoxicillin and prednisone(tapered, last done on 11/21/23)  -Thyroid studies consistent with hyperthyroidism based on the document from the patient(TSH<0.01, fT3 6,7, Total T3 222, ; [facility] thyroid showed multiple nodules>>biopsy showed benign findings)    Plan:  -f/u TSH in the AM  -endo Follow up OP    #Laryngitis  -PPI BID  -ENT F/U    #MISC:  -GI ppx: None  -DVT ppx: Heparin sq  -Diet: Regular  -Activity: IAT  -Code status: Full code  -Dispo: transfer to [facility] for continuos VEEG monitoring

## 2023-11-23 NOTE — CONSULT NOTE ADULT - SUBJECTIVE AND OBJECTIVE BOX
Pt presents with hoarsness of voice which began approximately 3 weeks ago.  at that time pt was treated on several occassions by her PMD.  Initially given Amoxicillin and then one week later given a tapering dose of steroids.  At that time on her third visit to her PMD she was diagnosed with pneumonia.  Her amoxicillin dose was increased and the steroids were started.  Pt reports after 2 days of steroids she had a seizure.  Her last prior seizure was many years ago.  Pt denies and fever chills ear pain or neck pain or sore throat.  Pt states her breathing is good and she has no difficulty tolerating diet. Pt states she saw Dr Denny 2 days ago and was given a prescription for Omeprazole but pt states she has not filled the medication yet.    PAST MEDICAL & SURGICAL HISTORY:  History of epilepsy      Pulmonary nodule      Cervical herniated disc      Pancreatic enlargement  tail of pancreas enlarged/inflammed      Hyperthyroidism      H/O bilateral salpingectomy      H/O unilateral oophorectomy        Allergies    No Known Allergies    Intolerances      MEDICATIONS  (STANDING):    MEDICATIONS  (PRN):    Social History: ????    ROS: ENT, GI, , CV, Pulm, Neuro, Psych, MS, Heme, Endo, Constitional; all negative except as noted in HPI    Vital Signs Last 24 Hrs  T(C): 36.4 (23 Nov 2023 16:12), Max: 36.4 (23 Nov 2023 16:12)  T(F): 97.6 (23 Nov 2023 16:12), Max: 97.6 (23 Nov 2023 16:12)  HR: 104 (23 Nov 2023 16:12) (104 - 104)  BP: 132/68 (23 Nov 2023 16:12) (132/68 - 132/68)  BP(mean): --  RR: 18 (23 Nov 2023 16:12) (18 - 18)  SpO2: 93% (23 Nov 2023 16:12) (93% - 93%)    Parameters below as of 23 Nov 2023 16:12  Patient On (Oxygen Delivery Method): room air                              14.0   12.16 )-----------( 222      ( 23 Nov 2023 18:03 )             41.4    11-23    134<L>  |  102  |  13  ----------------------------<  110<H>  4.2   |  22  |  0.5<L>    Ca    9.1      23 Nov 2023 18:03  Phos  3.7     11-23  Mg     2.4     11-23    TPro  6.4  /  Alb  4.0  /  TBili  0.2  /  DBili  x   /  AST  24  /  ALT  30  /  AlkPhos  208<H>  11-23       PHYSICAL EXAM:  Gen: NAD, well-developed  Head: Normocephalic, Atraumatic  Face: no edema/erythema/fluctuance, parotid glands soft without mass  Eyes: PERRL, EOMI, no scleral injection  Ears: Right - ear canal clear, TM intact without effusion            Left - ear canal clear, TM intact without effusion  Nose: Nares bilaterally patent, no discharge  Mouth: Mucosa moist, tongue/uvula midline, oropharynx clear, FFL reveals mildly erythematous vocal cords which are symmetrical without any obvious nodules.  The airway is grossly patent.  Neck: Flat, supple, no lymphadenopathy, trachea midline, no masses  Resp: breathing easily, no stridor  CV: no peripheral edema/cyanosis  < from: CT Head No Cont (11.23.23 @ 20:06) >    ACC: [id number] EXAM:  CT BRAIN   ORDERED BY: CARLEY RIVERO     PROCEDURE DATE:  11/23/2023          INTERPRETATION:  CLINICAL INDICATION: Seizures.    Technique: CT of the head was performed without contrast.    Multiple contiguous axial images were acquired from the skull base to the   vertex without the administration of intravenous contrast.  Coronal and   sagittal reformations were made.    COMPARISON: None available.    FINDINGS:  Ventricles and cortical sulcal pattern are age appropriate.    Gray-white differentiation is maintained. No evidence of recent   territorial infarction.    No acute intracranial hemorrhage or extra-axial fluid collection.    No intracranial mass effect or midline shift. No vasogenic edema.    No depressed calvarial fracture. Mastoid air cells are clear without   opacification or coalescence. Visualized paranasal sinuses are clear.   Globes and orbits have a normal CT appearance as visualized.      IMPRESSION:  1.  No CT evidence of acute intracranial pathology.    --- End of Report ---            NOMAN BUSTILLO MD; Attending Radiologist  This document has been electronically signed. Nov 23 2023  8:37PM    < end of copied text >   Pt presents with hoarsness of voice which began approximately 3 weeks ago.  at that time pt was treated on several occassions by her PMD.  Initially given Amoxicillin and then one week later given a tapering dose of steroids.  At that time on her third visit to her PMD she was diagnosed with pneumonia.  Her amoxicillin dose was increased and the steroids were started.  Pt reports after 2 days of steroids she had a seizure.  Her last prior seizure was many years ago.  Pt was seen at [facility] yesterday and a CTscan of neck was performed according to the pt and the results did not show any abnormality.  We do not have a copy of that CTscan. Pt denies and fever chills ear pain or neck pain or sore throat.  Pt states her breathing is good and she has no difficulty tolerating diet. Pt states she saw Dr Denny 2 days ago and was given a prescription for Omeprazole but pt states she has not filled the medication yet.    PAST MEDICAL & SURGICAL HISTORY:  History of epilepsy      Pulmonary nodule      Cervical herniated disc      Pancreatic enlargement  tail of pancreas enlarged/inflammed      Hyperthyroidism      H/O bilateral salpingectomy      H/O unilateral oophorectomy        Allergies    No Known Allergies    Intolerances      MEDICATIONS  (STANDING):    MEDICATIONS  (PRN):    Social History: ????    ROS: ENT, GI, , CV, Pulm, Neuro, Psych, MS, Heme, Endo, Constitional; all negative except as noted in HPI    Vital Signs Last 24 Hrs  T(C): 36.4 (23 Nov 2023 16:12), Max: 36.4 (23 Nov 2023 16:12)  T(F): 97.6 (23 Nov 2023 16:12), Max: 97.6 (23 Nov 2023 16:12)  HR: 104 (23 Nov 2023 16:12) (104 - 104)  BP: 132/68 (23 Nov 2023 16:12) (132/68 - 132/68)  BP(mean): --  RR: 18 (23 Nov 2023 16:12) (18 - 18)  SpO2: 93% (23 Nov 2023 16:12) (93% - 93%)    Parameters below as of 23 Nov 2023 16:12  Patient On (Oxygen Delivery Method): room air                              14.0   12.16 )-----------( 222      ( 23 Nov 2023 18:03 )             41.4    11-23    134<L>  |  102  |  13  ----------------------------<  110<H>  4.2   |  22  |  0.5<L>    Ca    9.1      23 Nov 2023 18:03  Phos  3.7     11-23  Mg     2.4     11-23    TPro  6.4  /  Alb  4.0  /  TBili  0.2  /  DBili  x   /  AST  24  /  ALT  30  /  AlkPhos  208<H>  11-23       PHYSICAL EXAM:  Gen: NAD, well-developed  Head: Normocephalic, Atraumatic  Face: no edema/erythema/fluctuance, parotid glands soft without mass  Eyes: PERRL, EOMI, no scleral injection  Ears: Right - ear canal clear, TM intact without effusion            Left - ear canal clear, TM intact without effusion  Nose: Nares bilaterally patent, no discharge  Mouth: Mucosa moist, tongue/uvula midline, oropharynx clear, FFL reveals mildly erythematous vocal cords which are symmetrical without any obvious nodules.  The airway is grossly patent.  Neck: Flat, supple, no lymphadenopathy, trachea midline, no masses  Resp: breathing easily, no stridor  CV: no peripheral edema/cyanosis  < from: CT Head No Cont (11.23.23 @ 20:06) >    ACC: [id number] EXAM:  CT BRAIN   ORDERED BY: CARLEY RIVERO     PROCEDURE DATE:  11/23/2023          INTERPRETATION:  CLINICAL INDICATION: Seizures.    Technique: CT of the head was performed without contrast.    Multiple contiguous axial images were acquired from the skull base to the   vertex without the administration of intravenous contrast.  Coronal and   sagittal reformations were made.    COMPARISON: None available.    FINDINGS:  Ventricles and cortical sulcal pattern are age appropriate.    Gray-white differentiation is maintained. No evidence of recent   territorial infarction.    No acute intracranial hemorrhage or extra-axial fluid collection.    No intracranial mass effect or midline shift. No vasogenic edema.    No depressed calvarial fracture. Mastoid air cells are clear without   opacification or coalescence. Visualized paranasal sinuses are clear.   Globes and orbits have a normal CT appearance as visualized.      IMPRESSION:  1.  No CT evidence of acute intracranial pathology.    --- End of Report ---            NOMAN BUSTILLO MD; Attending Radiologist  This document has been electronically signed. Nov 23 2023  8:37PM    < end of copied text >

## 2023-11-23 NOTE — CONSULT NOTE ADULT - ASSESSMENT
Dean Nice is a 53 year old female with seizure disorder is being evaluated for 3 episodes of breakthrough seizure after not having any for past 20 years. She has recently been prescribed amoxicillin which can lower seizure threshold , thyrotoxicosis can also cause seizure itself/lower seizure threshold.       Recommendations:   Dean Nice is a 53 year old female with seizure disorder is being evaluated for 3 episodes of breakthrough seizure after not having any for past 20 years. Her phenytoin level is subtherapeutic,  She has recently been prescribed amoxicillin which can lower seizure threshold , thyrotoxicosis can also cause seizure itself/lower seizure threshold.       Recommendations:   Dean Nice is a 53 year old female with seizure disorder is being evaluated for 3 episodes of breakthrough seizure after not having any for past 20 years. Her phenytoin level is subtherapeutic,  She has recently been prescribed amoxicillin which can lower seizure threshold , thyrotoxicosis can also cause seizure itself/lower seizure threshold.       Recommendations:  - Give Dilantin 1000 mg IVPB Stat loading dose.  - [facility]  - Seizure precaution.  - Keep Mg>2    Discussed with Neurology attending   Dean Nice is a 53 year old female with seizure disorder is being evaluated for 3 episodes of breakthrough seizure after not having any for past 20 years. Her phenytoin level is subtherapeutic,  She has recently been prescribed amoxicillin which can lower seizure threshold , thyrotoxicosis can also cause seizure itself/lower seizure threshold.       Recommendations:  - Give Dilantin 1000 mg IVPB Stat loading dose.  - Continue Dilantin 200 mg in am and 300 mg in pm for now.  - Recommend transfer to [facility] for continuos VEEG monitoring  - Seizure precaution.  - Keep Mg>2    Discussed with Neurology attending   Dean Nice is a 53 year old female with seizure disorder is being evaluated for 3 episodes of breakthrough seizure after not having any for past 20 years. Her phenytoin level is subtherapeutic,  She has recently been prescribed amoxicillin which can lower seizure threshold , thyrotoxicosis can also cause seizure itself/lower seizure threshold.       Recommendations:  - Give Dilantin 1000 mg IVPB Stat loading dose.  - Continue Dilantin 200 mg in am and 300 mg in pm for now.  - [facility]  - Seizure precaution.  - Keep Mg>2  - f/u Dilantin trough levels    Discussed with Neurology attending

## 2023-11-23 NOTE — H&P ADULT - NSHPPHYSICALEXAM_GEN_ALL_CORE
CONSTITUTIONAL: No distress  EYES: PERRLA and symmetric, EOMI, No conjunctival or scleral injection, non-icteric  ENMT: Oral mucosa with moist membranes.   RESP: No respiratory distress, no use of accessory muscles; CTA b/l, no WRR  CV: RRR, +S1S2, no MRG; no JVD; no peripheral edema  GI: Soft, NT, ND, no rebound, no guarding;   LYMPH: No cervical LAD or tenderness  MSK:  No digital clubbing or cyanosis  SKIN: No rashes or ulcers noted; no subcutaneous nodules or induration palpable  NEURO: CN II-XII intact; normal reflexes in upper and lower extremities, sensation intact in upper and lower extremities b/l to light touch   PSYCH: Appropriate insight/judgment; A+O x 3, mood and affect appropriate, recent/remote memory intact

## 2023-11-23 NOTE — ED PROVIDER NOTE - PROGRESS NOTE DETAILS
Authored by Paige Ferguson DO: Pt signed out to me from Dr. Morrow. Pending neurology recommendations. Patient reassessed, resting comfortably at this time.

## 2023-11-24 DIAGNOSIS — R56.9 UNSPECIFIED CONVULSIONS: ICD-10-CM

## 2023-11-24 LAB
GLUCOSE BLDC GLUCOMTR-MCNC: 103 MG/DL — HIGH (ref 70–99)
GLUCOSE BLDC GLUCOMTR-MCNC: 103 MG/DL — HIGH (ref 70–99)
PHENYTOIN FREE SERPL-MCNC: 13 UG/ML — SIGNIFICANT CHANGE UP (ref 10–20)
PHENYTOIN FREE SERPL-MCNC: 13 UG/ML — SIGNIFICANT CHANGE UP (ref 10–20)
PROLACTIN SERPL-MCNC: 13.1 NG/ML — SIGNIFICANT CHANGE UP (ref 3.4–24.1)
PROLACTIN SERPL-MCNC: 13.1 NG/ML — SIGNIFICANT CHANGE UP (ref 3.4–24.1)
T3 SERPL-MCNC: 207 NG/DL — HIGH (ref 80–200)
T3 SERPL-MCNC: 207 NG/DL — HIGH (ref 80–200)
T4 AB SER-ACNC: 9.5 UG/DL — SIGNIFICANT CHANGE UP (ref 4.6–12)
T4 AB SER-ACNC: 9.5 UG/DL — SIGNIFICANT CHANGE UP (ref 4.6–12)
T4 FREE SERPL-MCNC: 1.7 NG/DL — SIGNIFICANT CHANGE UP (ref 0.9–1.8)
T4 FREE SERPL-MCNC: 1.7 NG/DL — SIGNIFICANT CHANGE UP (ref 0.9–1.8)
TSH SERPL-MCNC: <0 UIU/ML — LOW (ref 0.27–4.2)
TSH SERPL-MCNC: <0 UIU/ML — LOW (ref 0.27–4.2)

## 2023-11-24 PROCEDURE — 95718 EEG PHYS/QHP 2-12 HR W/VEEG: CPT | Mod: 1L

## 2023-11-24 PROCEDURE — 99233 SBSQ HOSP IP/OBS HIGH 50: CPT

## 2023-11-24 RX ORDER — OMEPRAZOLE 10 MG/1
1 CAPSULE, DELAYED RELEASE ORAL
Refills: 0 | DISCHARGE

## 2023-11-24 RX ORDER — HEPARIN SODIUM 5000 [USP'U]/ML
5000 INJECTION INTRAVENOUS; SUBCUTANEOUS EVERY 12 HOURS
Refills: 0 | Status: DISCONTINUED | OUTPATIENT
Start: 2023-11-24 | End: 2023-11-25

## 2023-11-24 RX ORDER — CHOLECALCIFEROL (VITAMIN D3) 125 MCG
1 CAPSULE ORAL
Refills: 0 | DISCHARGE

## 2023-11-24 RX ADMIN — PANTOPRAZOLE SODIUM 40 MILLIGRAM(S): 20 TABLET, DELAYED RELEASE ORAL at 17:55

## 2023-11-24 RX ADMIN — PANTOPRAZOLE SODIUM 40 MILLIGRAM(S): 20 TABLET, DELAYED RELEASE ORAL at 06:02

## 2023-11-24 RX ADMIN — Medication 500 MILLIGRAM(S): at 06:03

## 2023-11-24 RX ADMIN — Medication 300 MILLIGRAM(S): at 21:30

## 2023-11-24 NOTE — CONSULT NOTE ADULT - ASSESSMENT
obesity, with a history of mild hyperthyroidism, stopped taking methimazole, try methimazole 5 mg. every other day, and [facility]. please continue weekly VIT d capsule, her seizures are unrelated to the very mild hyperthyroidism. try 1200 kcal diet to promote weight loss.

## 2023-11-24 NOTE — PATIENT PROFILE ADULT - NSPROPOAPRESSUREINJURY_GEN_A_NUR
Subjective   History of Present Illness  55-year-old white male complains of abdominal pain.  Patient awoke at 12:30 AM with sharp epigastric abdominal pain radiating to his mid abdomen and back.  He has not eaten since pain started.  He thought this might be due to constipation, he used suppository and had only small amount of stool out.  He took Tylenol which seemed to help his back pain some, but not his abdominal pain.  He denied nausea, vomiting, diarrhea, constipation.  He has had some urinary hesitancy, but denied any dysuria, hematuria.  No fever, chills.    Review of Systems   All other systems reviewed and are negative.    Past Medical History:   Diagnosis Date    Acid reflux     Allergic     Anxiety     Arthritis     Depression     GERD (gastroesophageal reflux disease)     Hypertension     Infectious mononucleosis     TAMIKO (obstructive sleep apnea)     NO OXYGEN USE    PONV (postoperative nausea and vomiting)     Seasonal allergies     Sinusitis     Sleep apnea     Urinary tract infection     WPW (Brayan-Parkinson-White syndrome)        Allergies   Allergen Reactions    Aleve [Naproxen] Hives       Past Surgical History:   Procedure Laterality Date    ABLATION OF DYSRHYTHMIC FOCUS  1999 and 2000    Dr. Carlin    CARDIAC ELECTROPHYSIOLOGY PROCEDURE N/A 6/4/2019    Procedure: Flutter;  Surgeon: Juan Carlin MD;  Location: [facility] INVASIVE LOCATION;  Service: Cardiology    CARDIAC SURGERY      WISDOM TOOTH EXTRACTION         Family History   Problem Relation Age of Onset    Hypertension Mother     Hypertension Father     Hyperlipidemia Father     Diabetes Father     Heart disease Father     Diabetes Brother     Diabetes Maternal Grandfather     Heart attack Maternal Grandfather     Diabetes Paternal Grandfather     Diabetes Maternal Grandmother     Cancer Maternal Grandmother        Social History     Socioeconomic History    Marital status:    Tobacco Use    Smoking status: Never     Smokeless tobacco: Never   Vaping Use    Vaping Use: Never used   Substance and Sexual Activity    Alcohol use: Yes     Comment: 4-5 BEERS, TWICE A WEEK    Drug use: No    Sexual activity: Defer           Objective   Physical Exam  Vitals and nursing note reviewed. Exam conducted with a chaperone present.   Constitutional:       Appearance: He is well-developed and normal weight.   HENT:      Head: Normocephalic and atraumatic.      Mouth/Throat:      Mouth: Mucous membranes are moist.      Pharynx: Oropharynx is clear.   Cardiovascular:      Rate and Rhythm: Normal rate and regular rhythm.      Heart sounds: Normal heart sounds. No murmur heard.    No friction rub. No gallop.   Pulmonary:      Effort: Pulmonary effort is normal. No respiratory distress.      Breath sounds: Normal breath sounds. No stridor. No wheezing, rhonchi or rales.   Chest:      Chest wall: No tenderness.   Abdominal:      General: There is no distension.      Palpations: Abdomen is soft.      Tenderness: There is no abdominal tenderness.   Musculoskeletal:         General: Normal range of motion.      Right lower leg: No edema.      Left lower leg: No edema.   Skin:     General: Skin is warm and dry.   Neurological:      General: No focal deficit present.      Mental Status: He is alert and oriented to person, place, and time.   Psychiatric:         Mood and Affect: Mood normal.         Behavior: Behavior normal.       Procedures  Results for orders placed or performed during the hospital encounter of 08/07/23   Comprehensive Metabolic Panel    Specimen: Arm, Right; Blood   Result Value Ref Range    Glucose 206 (H) 65 - 99 mg/dL    BUN 22 (H) 6 - 20 mg/dL    Creatinine 0.99 0.76 - 1.27 mg/dL    Sodium 138 136 - 145 mmol/L    Potassium 4.2 3.5 - 5.2 mmol/L    Chloride 104 98 - 107 mmol/L    CO2 23.2 22.0 - 29.0 mmol/L    Calcium 9.4 8.6 - 10.5 mg/dL    Total Protein 7.5 6.0 - 8.5 g/dL    Albumin 4.4 3.5 - 5.2 g/dL    ALT (SGPT) 30 1 - 41 U/L     AST (SGOT) 19 1 - 40 U/L    Alkaline Phosphatase 87 39 - 117 U/L    Total Bilirubin 0.2 0.0 - 1.2 mg/dL    Globulin 3.1 gm/dL    A/G Ratio 1.4 g/dL    BUN/Creatinine Ratio 22.2 7.0 - 25.0    Anion Gap 10.8 5.0 - 15.0 mmol/L    eGFR 90.5 >60.0 mL/min/1.73   Urinalysis With Culture If Indicated - Urine, Clean Catch    Specimen: Urine, Clean Catch   Result Value Ref Range    Color, UA Yellow Yellow, Straw    Appearance, UA Clear Clear    pH, UA 7.0 5.0 - 8.0    Specific Gravity, UA 1.026 1.005 - 1.030    Glucose,  mg/dL (Trace) (A) Negative    Ketones, UA Trace (A) Negative    Bilirubin, UA Negative Negative    Blood, UA Negative Negative    Protein, UA Negative Negative    Leuk Esterase, UA Negative Negative    Nitrite, UA Negative Negative    Urobilinogen, UA 1.0 E.U./dL 0.2 - 1.0 E.U./dL   Lipase    Specimen: Arm, Right; Blood   Result Value Ref Range    Lipase 29 13 - 60 U/L   C-reactive Protein    Specimen: Arm, Right; Blood   Result Value Ref Range    C-Reactive Protein <0.30 0.00 - 0.50 mg/dL   CBC Auto Differential    Specimen: Arm, Right; Blood   Result Value Ref Range    WBC 13.23 (H) 3.40 - 10.80 10*3/mm3    RBC 4.68 4.14 - 5.80 10*6/mm3    Hemoglobin 13.5 13.0 - 17.7 g/dL    Hematocrit 41.3 37.5 - 51.0 %    MCV 88.2 79.0 - 97.0 fL    MCH 28.8 26.6 - 33.0 pg    MCHC 32.7 31.5 - 35.7 g/dL    RDW 13.3 12.3 - 15.4 %    RDW-SD 43.5 37.0 - 54.0 fl    MPV 9.5 6.0 - 12.0 fL    Platelets 289 140 - 450 10*3/mm3    Neutrophil % 78.8 (H) 42.7 - 76.0 %    Lymphocyte % 14.1 (L) 19.6 - 45.3 %    Monocyte % 5.6 5.0 - 12.0 %    Eosinophil % 0.4 0.3 - 6.2 %    Basophil % 0.7 0.0 - 1.5 %    Immature Grans % 0.4 0.0 - 0.5 %    Neutrophils, Absolute 10.44 (H) 1.70 - 7.00 10*3/mm3    Lymphocytes, Absolute 1.86 0.70 - 3.10 10*3/mm3    Monocytes, Absolute 0.74 0.10 - 0.90 10*3/mm3    Eosinophils, Absolute 0.05 0.00 - 0.40 10*3/mm3    Basophils, Absolute 0.09 0.00 - 0.20 10*3/mm3    Immature Grans, Absolute 0.05 0.00 -  0.05 10*3/mm3    nRBC 0.0 0.0 - 0.2 /100 WBC   Amylase    Specimen: Arm, Right; Blood   Result Value Ref Range    Amylase 49 28 - 100 U/L   Green Top (Gel)   Result Value Ref Range    Extra Tube Hold for add-ons.    Lavender Top   Result Value Ref Range    Extra Tube hold for add-on    Gold Top - SST   Result Value Ref Range    Extra Tube Hold for add-ons.    Light Blue Top   Result Value Ref Range    Extra Tube Hold for add-ons.      CT Abdomen Pelvis Without Contrast    Result Date: 8/7/2023  Narrative: EXAM:   CT Abdomen and Pelvis Without Intravenous Contrast  EXAM DATE:   8/7/2023 7:55 AM  CLINICAL HISTORY:   epigastric pain, consider pancreatitis  TECHNIQUE:   Axial computed tomography images of the abdomen and pelvis without intravenous contrast.  Sagittal and coronal reformatted images were created and reviewed.  This CT exam was performed using one or more of the following dose reduction techniques:  automated exposure control, adjustment of the mA and/or kV according to patient size, and/or use of iterative reconstruction technique.  COMPARISON:   No relevant prior studies available.  FINDINGS:   Lung bases:  5.5 mm pulmonary nodule right lower lobe subpleural in location, image #8.  Lung bases otherwise clear.   Heart:  Coronary calcifications.   ABDOMEN:   Liver:  Diffuse fatty infiltration of liver.   Gallbladder and bile ducts:  Distended gallbladder with luminal stones noted.  No ductal dilation.   Pancreas:  Unremarkable.  No ductal dilation.   Spleen:  Unremarkable.  No splenomegaly.   Adrenals:  Unremarkable.  No mass.   Kidneys and ureters:  Unremarkable.  No obstructing stones.  No hydronephrosis.   Stomach and bowel:  Moderate constipation. No bowel obstruction.  No mucosal thickening.   PELVIS:   Appendix:  Normal appendix.   Bladder:  Incomplete distention of urinary bladder.  No stones.   Reproductive:  Unremarkable as visualized.   ABDOMEN and PELVIS:   Intraperitoneal space:  No  pneumoperitoneum.  No significant fluid collection.   Bones/joints:  Degenerative changes throughout the lumbar spine with multilevel central canal and neural foraminal stenosis noted. No acute bony findings.  No dislocation.   Soft tissues:  Unremarkable.   Vasculature:  Mild atherosclerosis aorta. No evidence of aneurysm.   Lymph nodes:  Unremarkable.  No enlarged lymph nodes.      Impression: 1.  Cholelithiasis and distended gallbladder. 2.  Diffuse fatty infiltration of liver. 3.  Moderate constipation. No bowel obstruction. 4.  5.5 mm pulmonary nodule right lower lobe. Recommend follow-up chest CT in 6 months. 5.  Other incidental/nonacute findings detailed above.  This report was finalized on 8/7/2023 9:16 AM by Dr. Wilver Aly MD.      US Gallbladder    Result Date: 8/7/2023  Narrative: EXAM:   US Abdomen Limited, Gallbladder  EXAM DATE:   8/7/2023 8:08 AM  CLINICAL HISTORY:   epigastric pain/RUQ pain  TECHNIQUE:   Real-time ultrasound of the right upper quadrant with image documentation.  COMPARISON:   No relevant prior studies available.  FINDINGS:   Liver:  Moderate diffuse fatty infiltration of liver.   Gallbladder:  Distended gallbladder with luminal sludge and small nonshadowing stones.  Borderline gallbladder wall thickening but without pericholecystic fluid identified on ultrasound.   Common bile duct:  Common bile duct is 4.4 m and is within normal limits.  No stones.  No dilation.   Pancreas:  Unremarkable as visualized.      Impression: 1.  Distended gallbladder with luminal sludge and small nonshadowing stones. 2.  Borderline gallbladder wall thickening but without pericholecystic fluid identified on ultrasound. 3.  Common bile duct is 4.4 m and is within normal limits. 4.  Moderate diffuse fatty infiltration of liver.  This report was finalized on 8/7/2023 9:13 AM by Dr. Wilver Aly MD.              ED Course  ED Course as of 08/07/23 1412   Mon Aug 07, 2023   1354 Patient states he is  feeling better with minimal pain and some continued nausea.  Reviewed results of his workup.  He does have cholelithiasis without signs of cholecystitis or  iobstruction.  He is ready to go home.  He has called Dr. Santoyo's office already for follow-up and will see them today. [BC]      ED Course User Index  [BC] Fernando Moya MD                                           Medical Decision Making  Problems Addressed:  Calculus of gallbladder without cholecystitis without obstruction: complicated acute illness or injury    Amount and/or Complexity of Data Reviewed  Radiology: ordered.    Risk  Prescription drug management.        Final diagnoses:   Calculus of gallbladder without cholecystitis without obstruction       ED Disposition  ED Disposition       ED Disposition   Discharge    Condition   Stable    Comment   --               Monserrat Santoyo MD  [address] [place], [place] [id number] [phone]    Today           Medication List      No changes were made to your prescriptions during this visit.            Fernando Moya MD  08/07/23 7525     no

## 2023-11-24 NOTE — PATIENT PROFILE ADULT - FALL HARM RISK - HARM RISK INTERVENTIONS

## 2023-11-24 NOTE — PATIENT PROFILE ADULT - FUNCTIONAL ASSESSMENT - DAILY ACTIVITY 1.
Telephone Encounter by Phylicia Winters LPN at 07/30/18 12:14 PM     Author:  Phylicia Winters LPN Service:  (none) Author Type:  Licensed Nurse     Filed:  07/30/18 12:16 PM Encounter Date:  7/18/2018 Status:  Signed     :  Phylicia Winters LPN (Licensed Nurse)            To[DP1.1M] Dr Mccullough[DP1.1T]  Pt requesting an HSO for after 4:30 pm. I do not seen any same days this or next week    Any possibility of a fit in as requested or pt to see another provider for the HSO?[DP1.1M]      Revision History        User Key Date/Time User Provider Type Action    > DP1.1 07/30/18 12:16 PM Phylicia Winters LPN Licensed Nurse Sign    M - Manual, T - Template             4 = No assist / stand by assistance

## 2023-11-24 NOTE — PROGRESS NOTE ADULT - SUBJECTIVE AND OBJECTIVE BOX
SUBJECTIVE:    Patient is a 53y old Female who presents with a chief complaint of   Currently admitted to medicine with the primary diagnosis of Seizures       Today is hospital day 1d. This morning she is resting comfortably in bed and reports no new issues or overnight events.    denies sob/ difficulty swallowing /sore throat     ROS:   CONSTITUTIONAL: No weakness, fevers or chills   EYES/ENT: No visual changes; No vertigo or throat pain   NECK: No pain or stiffness   RESPIRATORY: No cough, wheezing, hemoptysis; No shortness of breath   CARDIOVASCULAR: No chest pain or palpitations   GASTROINTESTINAL: No abdominal or epigastric pain. No nausea, vomiting, or hematemesis; No diarrhea or constipation. No melena or hematochezia.  GENITOURINARY: No dysuria, frequency or hematuria  NEUROLOGICAL: No numbness or weakness        PAST MEDICAL & SURGICAL HISTORY  History of epilepsy    Pulmonary nodule    Cervical herniated disc    Pancreatic enlargement  tail of pancreas enlarged/inflammed    Hyperthyroidism    H/O bilateral salpingectomy    H/O unilateral oophorectomy      SOCIAL HISTORY:    ALLERGIES:  No Known Allergies    MEDICATIONS:  STANDING MEDICATIONS  heparin   Injectable 5000 Unit(s) SubCutaneous every 12 hours  pantoprazole    Tablet 40 milliGRAM(s) Oral two times a day  phenytoin   Capsule 300 milliGRAM(s) Oral at bedtime  phenytoin   Capsule 200 milliGRAM(s) Oral <User Schedule>    PRN MEDICATIONS  acetaminophen     Tablet .. 650 milliGRAM(s) Oral every 6 hours PRN  aluminum hydroxide/magnesium hydroxide/simethicone Suspension 30 milliLiter(s) Oral every 4 hours PRN  melatonin 3 milliGRAM(s) Oral at bedtime PRN  ondansetron Injectable 4 milliGRAM(s) IV Push every 8 hours PRN    VITALS:   T(F): 96.1  HR: 83  BP: 110/56  RR: 16  SpO2: 94%    LABS:  Negative for smoking/alcohol/drug use.                         14.0   12.16 )-----------( 222      ( 2023 18:03 )             41.4     11-23    134<L>  |  102  |  13  ----------------------------<  110<H>  4.2   |  22  |  0.5<L>    Ca    9.1      2023 18:03  Phos  3.7     11-  Mg     2.4         TPro  6.4  /  Alb  4.0  /  TBili  0.2  /  DBili  x   /  AST  24  /  ALT  30  /  AlkPhos  208<H>        Urinalysis Basic - ( 2023 19:43 )    Color: Yellow / Appearance: Cloudy / S.009 / pH: x  Gluc: x / Ketone: Negative mg/dL  / Bili: Negative / Urobili: 0.2 mg/dL   Blood: x / Protein: Negative mg/dL / Nitrite: Negative   Leuk Esterase: Negative / RBC: 0 /HPF / WBC 3 /HPF   Sq Epi: x / Non Sq Epi: 13 /HPF / Bacteria: Moderate /HPF        Lactate, Blood: 0.9 mmol/L (23 @ 18:03)          RADIOLOGY:    PHYSICAL EXAM:  GEN: No acute distress  HEENT: normocephalic, atraumatic, aniceteric  LUNGS: Clear to auscultation bilaterally, no rales/wheezing/ rhonchi  HEART: S1/S2 present. RRR, no murmurs  ABD: Soft, non-tender, non-distended. Bowel sounds present  EXT: warm , no edema   NEURO: AAOX3, normal affect      ASSESSMENT AND PLAN:    53 year old female with PMH of Seizure disorder, Hyperthyroidism presented to ED after 3 episodes of seizures since yesterday. She didn't have any seizure in past 20 years.       #Breakthrough seizure  #H/O Seizure disorder  -Patient followed with Dr. Felder sporadically, last visit was in 2022. As per note from that visit, patient supposed to be on Dilantin 200+300 mg , and Zonisamide 100 mg QHS. Patient currently only on Dilantin. Her seizure is not characterized, one REEG in chart from 10/2020 reported as normal.   - Serum phenytoin level 3.4(low)  - CT Head No Cont (23 @ 20:06): No CT evidence of acute intracranial pathology.  -S/P: Keppra 1gm IV x 1, Dilantin 1.3 gm IV x 1 in the ED  - AED per neuro   - VEEG       #Hyperthyroidism  -3 and a half weeks ago(oct 30) patient was diagnosed with hyperthyroidism and was prescribed methimazole(took for 10 dyas) which she is no longer taking.  Patient developed a pharyngitis for which she was placed on antibiotics, followed by an pneumonia for which she was given amoxicillin and prednisone(tapered, last done on 23)  -Thyroid studies consistent with hyperthyroidism based on the document from the patient(TSH<0.01, fT3 6,7, Total T3 222, ; [facility] thyroid showed multiple nodules>>biopsy showed benign findings)  - cw methimazole   - endocrinology consult     #Laryngitis - improving  - denies sob/ cough/ difficulty swallowing   - reports hoarse voice improving   - completed op abx x 10 days  -PPI BID  -ENT eval appreciated     # dvt/gi ppx/diet  #dispo: acute  # handoff: veeg / endo eval

## 2023-11-24 NOTE — CONSULT NOTE ADULT - SUBJECTIVE AND OBJECTIVE BOX
Reason for Endocrinology Consult: Diabetes    HPI: 53y Female      Outpatient Endocrinologist?    PAST MEDICAL & SURGICAL HISTORY:  History of epilepsy      Pulmonary nodule      Cervical herniated disc      Pancreatic enlargement  tail of pancreas enlarged/inflammed      Hyperthyroidism      H/O bilateral salpingectomy      H/O unilateral oophorectomy        FAMILY HISTORY:  No pertinent family history in first degree relatives        SH:  Smoking  Etoh:  Recreational Drugs:    Home Medications:  cholecalciferol 1250 mcg (50,000 intl units) oral capsule: 1 cap(s) orally once a week (2023 02:14)  Dilantin 100 mg oral capsule: 5 cap(s) orally 2 times a day (2023 02:14)  omeprazole 20 mg oral delayed release tablet: 1 tab(s) orally 2 times a day (2023 02:14)      Current (Non-Endocrine) Meds:  acetaminophen     Tablet .. 650 milliGRAM(s) Oral every 6 hours PRN  aluminum hydroxide/magnesium hydroxide/simethicone Suspension 30 milliLiter(s) Oral every 4 hours PRN  heparin   Injectable 5000 Unit(s) SubCutaneous every 12 hours  melatonin 3 milliGRAM(s) Oral at bedtime PRN  ondansetron Injectable 4 milliGRAM(s) IV Push every 8 hours PRN  pantoprazole    Tablet 40 milliGRAM(s) Oral two times a day  phenytoin   Capsule 300 milliGRAM(s) Oral at bedtime  phenytoin   Capsule 200 milliGRAM(s) Oral <User Schedule>      Current Endocrine Meds:       Allergies:  No Known Allergies      ROS:  Denies the following except as indicated.    General: weight loss/weight gain, decreased appetite, fatigue, fever  Eyes: blurry vision, double vision  ENT: neck swelling, dysphagia, voice changes   CV: palpitations, SOB, chest pain, cough  GI: nausea, vomiting, diarrhea, constipation, abdominal pain  : nocturia,  polyuria, dysuria  Endo: decreased libido, heat/cold intolerance, jitteriness  MSK: arthralgias, myalgias  Skin: rash, dryness, diaphoresis  Neuro: pedal numbness,pedal paresthesias, pedal pain    Height (cm): 162.6 ( @ 03:03)  Weight (kg): 93 ( @ 03:03)  BMI (kg/m2): 35.2 ( @ 03:03)    Vital Signs Last 24 Hrs  T(C): 35.8 (2023 14:33), Max: 36.6 (2023 22:30)  T(F): 96.4 (2023 14:33), Max: 97.9 (2023 22:30)  HR: 80 (2023 14:33) (79 - 85)  BP: 111/53 (2023 14:33) (110/56 - 121/75)  BP(mean): --  RR: 16 (:) (16 - 18)  SpO2: 95% (:) (94% - 96%)    Parameters below as of :  Patient On (Oxygen Delivery Method): room air      Constitutional: WN/WD in NAD.   Neck: no thyromegaly or palpable thyroid nodules   Respiratory: lungs CTAB.  Cardiovascular: regular rate and rhythm, normal S1 and S2, no audible murmurs  GI: soft, NT/ND, no masses/HSM appreciated.  Ext: no edema, no ulcers, pedal pulses palpable bilaterally  Neurology: no tremor, monofilament sensation intact in feet  Psychiatric: A&O x 3, normal affect/mood.        LABS:                        14.0   12.16 )-----------( 222      ( 2023 18:03 )             41.4     11-    134<L>  |  102  |  13  ----------------------------<  110<H>  4.2   |  22  |  0.5<L>    Ca    9.1      2023 18:03  Phos  3.7     11-  Mg     2.4     11    TPro  6.4  /  Alb  4.0  /  TBili  0.2  /  DBili  x   /  AST  24  /  ALT  30  /  AlkPhos  208<H>  11-23      Urinalysis Basic - ( 2023 19:43 )    Color: Yellow / Appearance: Cloudy / S.009 / pH: x  Gluc: x / Ketone: Negative mg/dL  / Bili: Negative / Urobili: 0.2 mg/dL   Blood: x / Protein: Negative mg/dL / Nitrite: Negative   Leuk Esterase: Negative / RBC: 0 /HPF / WBC 3 /HPF   Sq Epi: x / Non Sq Epi: 13 /HPF / Bacteria: Moderate /HPF                             Thyroid Stimulating Hormone, Serum: <0.00 ( @ 19:43)    Free Thyroxine, Serum: 1.7 ng/dL (23 @ 19:43)    Triiodothyronine, Total (T3 Total): 207 ng/dL (23 @ 19:43)      RADIOLOGY & ADDITIONAL STUDIES:    A/P:53yFemale

## 2023-11-25 ENCOUNTER — TRANSCRIPTION ENCOUNTER (OUTPATIENT)
Age: 53
End: 2023-11-25

## 2023-11-25 VITALS
OXYGEN SATURATION: 93 % | RESPIRATION RATE: 18 BRPM | DIASTOLIC BLOOD PRESSURE: 67 MMHG | HEART RATE: 78 BPM | SYSTOLIC BLOOD PRESSURE: 133 MMHG | TEMPERATURE: 96 F

## 2023-11-25 DIAGNOSIS — E05.90 THYROTOXICOSIS, UNSPECIFIED WITHOUT THYROTOXIC CRISIS OR STORM: ICD-10-CM

## 2023-11-25 LAB
ALBUMIN SERPL ELPH-MCNC: 3.7 G/DL — SIGNIFICANT CHANGE UP (ref 3.5–5.2)
ALBUMIN SERPL ELPH-MCNC: 3.7 G/DL — SIGNIFICANT CHANGE UP (ref 3.5–5.2)
ALP SERPL-CCNC: 185 U/L — HIGH (ref 30–115)
ALP SERPL-CCNC: 185 U/L — HIGH (ref 30–115)
ALT FLD-CCNC: 28 U/L — SIGNIFICANT CHANGE UP (ref 0–41)
ALT FLD-CCNC: 28 U/L — SIGNIFICANT CHANGE UP (ref 0–41)
ANION GAP SERPL CALC-SCNC: 10 MMOL/L — SIGNIFICANT CHANGE UP (ref 7–14)
ANION GAP SERPL CALC-SCNC: 10 MMOL/L — SIGNIFICANT CHANGE UP (ref 7–14)
AST SERPL-CCNC: 21 U/L — SIGNIFICANT CHANGE UP (ref 0–41)
AST SERPL-CCNC: 21 U/L — SIGNIFICANT CHANGE UP (ref 0–41)
BASOPHILS # BLD AUTO: 0.05 K/UL — SIGNIFICANT CHANGE UP (ref 0–0.2)
BASOPHILS # BLD AUTO: 0.05 K/UL — SIGNIFICANT CHANGE UP (ref 0–0.2)
BASOPHILS NFR BLD AUTO: 0.7 % — SIGNIFICANT CHANGE UP (ref 0–1)
BASOPHILS NFR BLD AUTO: 0.7 % — SIGNIFICANT CHANGE UP (ref 0–1)
BILIRUB SERPL-MCNC: 0.3 MG/DL — SIGNIFICANT CHANGE UP (ref 0.2–1.2)
BILIRUB SERPL-MCNC: 0.3 MG/DL — SIGNIFICANT CHANGE UP (ref 0.2–1.2)
BUN SERPL-MCNC: 11 MG/DL — SIGNIFICANT CHANGE UP (ref 10–20)
BUN SERPL-MCNC: 11 MG/DL — SIGNIFICANT CHANGE UP (ref 10–20)
CALCIUM SERPL-MCNC: 8.7 MG/DL — SIGNIFICANT CHANGE UP (ref 8.4–10.5)
CALCIUM SERPL-MCNC: 8.7 MG/DL — SIGNIFICANT CHANGE UP (ref 8.4–10.5)
CHLORIDE SERPL-SCNC: 107 MMOL/L — SIGNIFICANT CHANGE UP (ref 98–110)
CHLORIDE SERPL-SCNC: 107 MMOL/L — SIGNIFICANT CHANGE UP (ref 98–110)
CO2 SERPL-SCNC: 25 MMOL/L — SIGNIFICANT CHANGE UP (ref 17–32)
CO2 SERPL-SCNC: 25 MMOL/L — SIGNIFICANT CHANGE UP (ref 17–32)
CREAT SERPL-MCNC: 0.6 MG/DL — LOW (ref 0.7–1.5)
CREAT SERPL-MCNC: 0.6 MG/DL — LOW (ref 0.7–1.5)
EGFR: 108 ML/MIN/1.73M2 — SIGNIFICANT CHANGE UP
EGFR: 108 ML/MIN/1.73M2 — SIGNIFICANT CHANGE UP
EOSINOPHIL # BLD AUTO: 0.14 K/UL — SIGNIFICANT CHANGE UP (ref 0–0.7)
EOSINOPHIL # BLD AUTO: 0.14 K/UL — SIGNIFICANT CHANGE UP (ref 0–0.7)
EOSINOPHIL NFR BLD AUTO: 1.9 % — SIGNIFICANT CHANGE UP (ref 0–8)
EOSINOPHIL NFR BLD AUTO: 1.9 % — SIGNIFICANT CHANGE UP (ref 0–8)
GLUCOSE SERPL-MCNC: 83 MG/DL — SIGNIFICANT CHANGE UP (ref 70–99)
GLUCOSE SERPL-MCNC: 83 MG/DL — SIGNIFICANT CHANGE UP (ref 70–99)
HCT VFR BLD CALC: 39.9 % — SIGNIFICANT CHANGE UP (ref 37–47)
HCT VFR BLD CALC: 39.9 % — SIGNIFICANT CHANGE UP (ref 37–47)
HGB BLD-MCNC: 13.6 G/DL — SIGNIFICANT CHANGE UP (ref 12–16)
HGB BLD-MCNC: 13.6 G/DL — SIGNIFICANT CHANGE UP (ref 12–16)
IMM GRANULOCYTES NFR BLD AUTO: 0.4 % — HIGH (ref 0.1–0.3)
IMM GRANULOCYTES NFR BLD AUTO: 0.4 % — HIGH (ref 0.1–0.3)
LYMPHOCYTES # BLD AUTO: 2.91 K/UL — SIGNIFICANT CHANGE UP (ref 1.2–3.4)
LYMPHOCYTES # BLD AUTO: 2.91 K/UL — SIGNIFICANT CHANGE UP (ref 1.2–3.4)
LYMPHOCYTES # BLD AUTO: 38.9 % — SIGNIFICANT CHANGE UP (ref 20.5–51.1)
LYMPHOCYTES # BLD AUTO: 38.9 % — SIGNIFICANT CHANGE UP (ref 20.5–51.1)
MAGNESIUM SERPL-MCNC: 2.2 MG/DL — SIGNIFICANT CHANGE UP (ref 1.8–2.4)
MAGNESIUM SERPL-MCNC: 2.2 MG/DL — SIGNIFICANT CHANGE UP (ref 1.8–2.4)
MCHC RBC-ENTMCNC: 29.6 PG — SIGNIFICANT CHANGE UP (ref 27–31)
MCHC RBC-ENTMCNC: 29.6 PG — SIGNIFICANT CHANGE UP (ref 27–31)
MCHC RBC-ENTMCNC: 34.1 G/DL — SIGNIFICANT CHANGE UP (ref 32–37)
MCHC RBC-ENTMCNC: 34.1 G/DL — SIGNIFICANT CHANGE UP (ref 32–37)
MCV RBC AUTO: 86.9 FL — SIGNIFICANT CHANGE UP (ref 81–99)
MCV RBC AUTO: 86.9 FL — SIGNIFICANT CHANGE UP (ref 81–99)
MONOCYTES # BLD AUTO: 0.67 K/UL — HIGH (ref 0.1–0.6)
MONOCYTES # BLD AUTO: 0.67 K/UL — HIGH (ref 0.1–0.6)
MONOCYTES NFR BLD AUTO: 8.9 % — SIGNIFICANT CHANGE UP (ref 1.7–9.3)
MONOCYTES NFR BLD AUTO: 8.9 % — SIGNIFICANT CHANGE UP (ref 1.7–9.3)
NEUTROPHILS # BLD AUTO: 3.69 K/UL — SIGNIFICANT CHANGE UP (ref 1.4–6.5)
NEUTROPHILS # BLD AUTO: 3.69 K/UL — SIGNIFICANT CHANGE UP (ref 1.4–6.5)
NEUTROPHILS NFR BLD AUTO: 49.2 % — SIGNIFICANT CHANGE UP (ref 42.2–75.2)
NEUTROPHILS NFR BLD AUTO: 49.2 % — SIGNIFICANT CHANGE UP (ref 42.2–75.2)
NRBC # BLD: 0 /100 WBCS — SIGNIFICANT CHANGE UP (ref 0–0)
NRBC # BLD: 0 /100 WBCS — SIGNIFICANT CHANGE UP (ref 0–0)
PLATELET # BLD AUTO: 191 K/UL — SIGNIFICANT CHANGE UP (ref 130–400)
PLATELET # BLD AUTO: 191 K/UL — SIGNIFICANT CHANGE UP (ref 130–400)
PMV BLD: 10.7 FL — HIGH (ref 7.4–10.4)
PMV BLD: 10.7 FL — HIGH (ref 7.4–10.4)
POTASSIUM SERPL-MCNC: 4.1 MMOL/L — SIGNIFICANT CHANGE UP (ref 3.5–5)
POTASSIUM SERPL-MCNC: 4.1 MMOL/L — SIGNIFICANT CHANGE UP (ref 3.5–5)
POTASSIUM SERPL-SCNC: 4.1 MMOL/L — SIGNIFICANT CHANGE UP (ref 3.5–5)
POTASSIUM SERPL-SCNC: 4.1 MMOL/L — SIGNIFICANT CHANGE UP (ref 3.5–5)
PROT SERPL-MCNC: 5.7 G/DL — LOW (ref 6–8)
PROT SERPL-MCNC: 5.7 G/DL — LOW (ref 6–8)
RBC # BLD: 4.59 M/UL — SIGNIFICANT CHANGE UP (ref 4.2–5.4)
RBC # BLD: 4.59 M/UL — SIGNIFICANT CHANGE UP (ref 4.2–5.4)
RBC # FLD: 12.5 % — SIGNIFICANT CHANGE UP (ref 11.5–14.5)
RBC # FLD: 12.5 % — SIGNIFICANT CHANGE UP (ref 11.5–14.5)
SODIUM SERPL-SCNC: 142 MMOL/L — SIGNIFICANT CHANGE UP (ref 135–146)
SODIUM SERPL-SCNC: 142 MMOL/L — SIGNIFICANT CHANGE UP (ref 135–146)
WBC # BLD: 7.49 K/UL — SIGNIFICANT CHANGE UP (ref 4.8–10.8)
WBC # BLD: 7.49 K/UL — SIGNIFICANT CHANGE UP (ref 4.8–10.8)
WBC # FLD AUTO: 7.49 K/UL — SIGNIFICANT CHANGE UP (ref 4.8–10.8)
WBC # FLD AUTO: 7.49 K/UL — SIGNIFICANT CHANGE UP (ref 4.8–10.8)

## 2023-11-25 PROCEDURE — 99222 1ST HOSP IP/OBS MODERATE 55: CPT

## 2023-11-25 PROCEDURE — 99239 HOSP IP/OBS DSCHRG MGMT >30: CPT

## 2023-11-25 RX ORDER — PANTOPRAZOLE SODIUM 20 MG/1
1 TABLET, DELAYED RELEASE ORAL
Qty: 60 | Refills: 0
Start: 2023-11-25 | End: 2023-12-24

## 2023-11-25 RX ORDER — METHIMAZOLE 10 MG/1
1 TABLET ORAL
Qty: 0 | Refills: 0 | DISCHARGE
Start: 2023-11-25

## 2023-11-25 RX ADMIN — Medication 200 MILLIGRAM(S): at 08:45

## 2023-11-25 RX ADMIN — PANTOPRAZOLE SODIUM 40 MILLIGRAM(S): 20 TABLET, DELAYED RELEASE ORAL at 05:21

## 2023-11-25 NOTE — CHART NOTE - NSCHARTNOTEFT_GEN_A_CORE
Pt phenytoin level therapeutic at 13.  Recommend continue current dose of phenytoin and if overnight VEEG negative for epileptiform activity, may d/c and has pt f/u as outpt with Dr. Felder in 2 - 4 weeks.

## 2023-11-25 NOTE — DISCHARGE NOTE PROVIDER - HOSPITAL COURSE
Patient is a 53 year old female with PMH of Seizure disorder, Hyperthyroidism presented to ED after 3 episodes of seizures since yesterday. She didn't have any seizure in past 20 years.       #Breakthrough seizure  #H/O Seizure disorder  -Patient followed with Dr. Felder sporadically, last visit was in 08/2022. As per note from that visit, patient supposed to be on Dilantin 200+300 mg , and Zonisamide 100 mg QHS. Patient currently only on Dilantin. Her seizure is not characterized, one REEG in chart from 10/2020 reported as normal.   - Serum phenytoin level 3.4(low)  - CT Head No Cont (11.23.23 @ 20:06): No CT evidence of acute intracranial pathology.  -S/P: Keppra 1gm IV x 1, Dilantin 1.3 gm IV x 1 in the ED  - AED per neuro   - neuro fu appreciated -  Pt phenytoin level therapeutic at 13.  Recommend continue current dose of phenytoin and if overnight VEEG negative for epileptiform activity, may d/c and has pt f/u as outpt with Dr. Felder in 2 - 4 weeks  - Abnormal VEEG, aditional 24 hr of VEEG recommended, Will c/w dilantin 200mg in am, and 300mg in pm for now  - Patient requesting leaving AMA  Patient is AAO x 3. I explained at length to the patient the risks of signing out AMA including but not limited to harm, injury or death. I explained the risks, benefits and alternatives to treatment as well as the attendant risks of refusing treatment at this time. I offered to answer any questions and fully answered any such questions. We believe that the patient fully understands what has been explained and answered. Patient signed form to sign out AMA and accepts responsibility for any and all results of this decision.      #Hyperthyroidism  - non compliant with medication  -3 and a half weeks ago(oct 30) patient was diagnosed with hyperthyroidism and was prescribed methimazole(took for 10 dyas) which she is no longer taking.  Patient developed a pharyngitis for which she was placed on antibiotics, followed by an pneumonia for which she was given amoxicillin and prednisone(tapered, last done on 11/21/23)  -Thyroid studies consistent with hyperthyroidism based on the document from the patient(TSH<0.01, fT3 6,7, Total T3 222, ; [facility] thyroid showed multiple nodules>>biopsy showed benign findings)  - cw methimazole   - endocrinology consult appreciated - methimazole 5 mg every other day, and [facility] after discharge.     # Laryngitis - improving  - denies sob/ cough/ difficulty swallowing   - reports hoarse voice improving   - completed op abx x 10 days  - PPI BID  - ENT eval appreciated      Patient is a 53 year old female with PMH of Seizure disorder, Hyperthyroidism presented to ED after 3 episodes of seizures since yesterday. She didn't have any seizure in past 20 years.       #Breakthrough seizure  #H/O Seizure disorder  -Patient followed with Dr. Felder sporadically, last visit was in 08/2022. As per note from that visit, patient supposed to be on Dilantin 200+300 mg , and Zonisamide 100 mg QHS. Patient currently only on Dilantin. Her seizure is not characterized, one REEG in chart from 10/2020 reported as normal.   - Serum phenytoin level 3.4(low)  - CT Head No Cont (11.23.23 @ 20:06): No CT evidence of acute intracranial pathology.  -S/P: Keppra 1gm IV x 1, Dilantin 1.3 gm IV x 1 in the ED  - AED per neuro   - neuro fu appreciated -  Pt phenytoin level therapeutic at 13.  Recommend continue current dose of phenytoin and if overnight VEEG negative for epileptiform activity, may d/c and has pt f/u as outpt with Dr. Felder in 2 - 4 weeks  - Abnormal VEEG, aditional 24 hr of VEEG recommended, Will c/w dilantin 200mg in am, and 300mg in pm for now  - Patient requesting leaving AMA  Patient is AAO x 3. I explained at length to the patient the risks of signing out AMA including but not limited to harm, injury or death. I explained the risks, benefits and alternatives to treatment as well as the attendant risks of refusing treatment at this time. I offered to answer any questions and fully answered any such questions. We believe that the patient fully understands what has been explained and answered. Patient signed form to sign out AMA and accepts responsibility for any and all results of this decision.      #Hyperthyroidism  - non compliant with medication  -3 and a half weeks ago(oct 30) patient was diagnosed with hyperthyroidism and was prescribed methimazole(took for 10 dyas) which she is no longer taking.  Patient developed a pharyngitis for which she was placed on antibiotics, followed by an pneumonia for which she was given amoxicillin and prednisone(tapered, last done on 11/21/23)  -Thyroid studies consistent with hyperthyroidism based on the document from the patient(TSH<0.01, fT3 6,7, Total T3 222, ; [facility] thyroid showed multiple nodules>>biopsy showed benign findings)  - cw methimazole   - endocrinology consult appreciated - methimazole 5 mg every other day, and patient can follow up with NP at [facility] in 1-2 months time after discharge.     # Laryngitis - improving  - denies sob/ cough/ difficulty swallowing   - reports hoarse voice improving   - completed op abx x 10 days  - PPI BID  - ENT eval appreciated     patient was instructed that neurology recommended additional 24 hrs of veeg  risks were discussed with pt about leaving with pa and myself in the morning  patient full capacity and wanted to leave ama

## 2023-11-25 NOTE — DISCHARGE NOTE PROVIDER - CARE PROVIDERS DIRECT ADDRESSES
,DirectAddress_Unknown ,DirectAddress_Unknown,[email or web address]

## 2023-11-25 NOTE — DISCHARGE NOTE PROVIDER - CARE PROVIDER_API CALL
Raul Zamora  Internal Medicine  [address] [place] [address]  Phone: [phone]  Fax: [phone]  Follow Up Time: 1-3 days   Raul Zamora  Internal Medicine  [address] [place] [address]  Phone: [phone]  Fax: [phone]  Follow Up Time: 1-3 days    Hugo Felder  Neurology  [address] [place] [address], [facility] [address]  [place] [address]  Phone: [phone]  Fax: [phone]  Follow Up Time:

## 2023-11-25 NOTE — PROGRESS NOTE ADULT - SUBJECTIVE AND OBJECTIVE BOX
SUBJECTIVE:    Patient is a 53y old Female who presents with a chief complaint of seizures. (24 Nov 2023 16:12)    Currently admitted to medicine with the primary diagnosis of Seizures       Today is hospital day 2d. This morning she is resting comfortably in bed and reports no new issues or overnight events.     ROS:   CONSTITUTIONAL: No weakness, fevers or chills   EYES/ENT: No visual changes; No vertigo or throat pain   NECK: No pain or stiffness   RESPIRATORY: No cough, wheezing, hemoptysis; No shortness of breath   CARDIOVASCULAR: No chest pain or palpitations   GASTROINTESTINAL: No abdominal or epigastric pain. No nausea, vomiting, or hematemesis; No diarrhea or constipation. No melena or hematochezia.  GENITOURINARY: No dysuria, frequency or hematuria  NEUROLOGICAL: No numbness or weakness  SKIN: No itching, rashes      PAST MEDICAL & SURGICAL HISTORY  History of epilepsy    Pulmonary nodule    Cervical herniated disc    Pancreatic enlargement  tail of pancreas enlarged/inflammed    Hyperthyroidism    H/O bilateral salpingectomy    H/O unilateral oophorectomy      SOCIAL HISTORY:    ALLERGIES:  No Known Allergies    MEDICATIONS:  STANDING MEDICATIONS  heparin   Injectable 5000 Unit(s) SubCutaneous every 12 hours  pantoprazole    Tablet 40 milliGRAM(s) Oral two times a day  phenytoin   Capsule 200 milliGRAM(s) Oral <User Schedule>  phenytoin   Capsule 300 milliGRAM(s) Oral at bedtime    PRN MEDICATIONS  acetaminophen     Tablet .. 650 milliGRAM(s) Oral every 6 hours PRN  aluminum hydroxide/magnesium hydroxide/simethicone Suspension 30 milliLiter(s) Oral every 4 hours PRN  melatonin 3 milliGRAM(s) Oral at bedtime PRN  ondansetron Injectable 4 milliGRAM(s) IV Push every 8 hours PRN    VITALS:   T(F): 96.1  HR: 78  BP: 133/67  RR: 18  SpO2: 93%    LABS:  Negative for smoking/alcohol/drug use.                         13.6   7.49  )-----------( 191      ( 25 Nov 2023 07:39 )             39.9     11-25    142  |  107  |  11  ----------------------------<  83  4.1   |  25  |  0.6<L>    Ca    8.7      25 Nov 2023 07:39  Phos  3.7     11-23  Mg     2.2     11-25    TPro  5.7<L>  /  Alb  3.7  /  TBili  0.3  /  DBili  x   /  AST  21  /  ALT  28  /  AlkPhos  185<H>  11-25      Urinalysis Basic - ( 25 Nov 2023 07:39 )    Color: x / Appearance: x / SG: x / pH: x  Gluc: 83 mg/dL / Ketone: x  / Bili: x / Urobili: x   Blood: x / Protein: x / Nitrite: x   Leuk Esterase: x / RBC: x / WBC x   Sq Epi: x / Non Sq Epi: x / Bacteria: x      RADIOLOGY:    PHYSICAL EXAM:  GEN: No acute distress  HEENT: normocephalic, atraumatic, aniceteric  LUNGS: bl breath sounds   HEART: S1/S2 present. RRR, no murmurs  ABD: Soft, non-tender, non-distended. Bowel sounds present  EXT: warm   NEURO: AAOX3, normal affect      ASSESSMENT AND PLAN:    53 year old female with PMH of Seizure disorder, Hyperthyroidism presented to ED after 3 episodes of seizures since yesterday. She didn't have any seizure in past 20 years.       #Breakthrough seizure  #H/O Seizure disorder  -Patient followed with Dr. Felder sporadically, last visit was in 08/2022. As per note from that visit, patient supposed to be on Dilantin 200+300 mg , and Zonisamide 100 mg QHS. Patient currently only on Dilantin. Her seizure is not characterized, one REEG in chart from 10/2020 reported as normal.   - Serum phenytoin level 3.4(low)  - CT Head No Cont (11.23.23 @ 20:06): No CT evidence of acute intracranial pathology.  -S/P: Keppra 1gm IV x 1, Dilantin 1.3 gm IV x 1 in the ED  - AED per neuro   - VEEG   - neuro fu appreciated -  Pt phenytoin level therapeutic at 13.  Recommend continue current dose of phenytoin and if overnight VEEG negative for epileptiform activity, may d/c and has pt f/u as outpt with Dr. Felder in 2 - 4 weeks      #Hyperthyroidism  - non compliant with medication  -3 and a half weeks ago(oct 30) patient was diagnosed with hyperthyroidism and was prescribed methimazole(took for 10 dyas) which she is no longer taking.  Patient developed a pharyngitis for which she was placed on antibiotics, followed by an pneumonia for which she was given amoxicillin and prednisone(tapered, last done on 11/21/23)  -Thyroid studies consistent with hyperthyroidism based on the document from the patient(TSH<0.01, fT3 6,7, Total T3 222, ; [facility] thyroid showed multiple nodules>>biopsy showed benign findings)  - cw methimazole   - endocrinology consult appreciated - methimazole 5 mg every other day, and patient can follow up with NP at [facility] in 1-2 months time after discharge.     # Laryngitis - improving  - denies sob/ cough/ difficulty swallowing   - reports hoarse voice improving   - completed op abx x 10 days  - PPI BID  - ENT eval appreciated     # dvt/gi ppx/diet  #dispo: acute - possible 24 hrs  # handoff: veeg

## 2023-11-25 NOTE — DISCHARGE NOTE PROVIDER - NSDCFUSCHEDAPPT_GEN_ALL_CORE_FT
Hugo Felder  [facility]  NEUROLOGY [address]  Scheduled Appointment: 12/13/2023    Jaclyn Negro  [facility]  ENDOCRIN [address]  Scheduled Appointment: 01/25/2024

## 2023-11-25 NOTE — DISCHARGE NOTE PROVIDER - NSDCMRMEDTOKEN_GEN_ALL_CORE_FT
cholecalciferol 1250 mcg (50,000 intl units) oral capsule: 1 cap(s) orally once a week  methIMAzole 5 mg oral tablet: 1 tab(s) orally every other day  omeprazole 20 mg oral delayed release tablet: 1 tab(s) orally 2 times a day  phenytoin 200 mg oral capsule, extended release: 1 cap(s) orally once a day (in the morning)  phenytoin 300 mg oral capsule, extended release: 1 cap(s) orally once a day (at bedtime)

## 2023-11-25 NOTE — DISCHARGE NOTE PROVIDER - NSDCCPCAREPLAN_GEN_ALL_CORE_FT
PRINCIPAL DISCHARGE DIAGNOSIS  Diagnosis: Seizures  Assessment and Plan of Treatment: Patient was seen and treated by the medical and neurology team for breakthrough sizures. CT Head with no acute findings. Patient was placed on VEEG. First VEEG came back abnormal, addition 24 hours of VEEG was recommended however patient decided to leave AMA. Patient is alert and oriented. I explained at length to the patient the risks of signing out AMA including but not limited to harm, injury or death. I explained the risks, benefits and alternatives to treatment as well as the attendant risks of refusing treatment at this time. I offered to answer any questions and fully answered any such questions. We believe that the patient fully understands what has been explained and answered. Patient signed form to sign out AMA and accepts responsibility for any and all results of this decision.  - For now continue Dilantin 200mg in am, and 300mg. Prescription was sent to patients pharmacy  - Follow up with neurologist Dr. Felder recommended as soon as possible.   - If recurrent seizures, changes in vision, dinzziness, nausea, vomiting, recommend reporting back to the ED.

## 2023-11-25 NOTE — DISCHARGE NOTE PROVIDER - PROVIDER TOKENS
PROVIDER:[TOKEN:[[id number]],FOLLOWUP:[1-3 days]] PROVIDER:[TOKEN:[[id number]],FOLLOWUP:[1-3 days]],PROVIDER:[TOKEN:[[id number]]]

## 2023-11-25 NOTE — EEG REPORT - NS EEG TEXT BOX
[facility]   COMPREHENSIVE EPILEPSY CENTER   REPORT OF CONTINUOUS VIDEO EEG     [facility]: [address], [place] [address], Ph#: [phone]  [facility]: [address], [place] [address], Ph#: [phone]  Office: [address] [place] [address], [facility] [address], [place] [address] Ph#: [phone]    Patient Name: TED ROSE  Age and : 53y (72)  MRN #: [id number]  Location: [place] [address]  Referring Physician: Ashely Hayward    Study Date: 23 - 23  Duration: 24 hr 4 min  _____________________________________________________________  STUDY INFORMATION    EEG Recording Technique:  The patient underwent continuous Video-EEG monitoring, using Telemetry System hardware on the XLTek Digital System. EEG and video data were stored on a computer hard drive with important events saved in digital archive files. The material was reviewed by a physician (electroencephalographer / epileptologist) on a daily basis. Arash and seizure detection algorithms were utilized and reviewed. An EEG Technician attended to the patient, and was available throughout daytime work hours.  The epilepsy center neurologist was available in person or on call 24-hours per day.    EEG Placement and Labeling of Electrodes:  The EEG was performed utilizing 20 channel referential EEG connections (coronal over temporal over parasagittal montage) using all standard 10-20 electrode placements with EKG, with additional electrodes placed in the inferior temporal region using the modified 10-10 montage electrode placements for elective admissions, or if deemed necessary. Recording was at a sampling rate of 256 samples per second per channel. Time synchronized digital video recording was done simultaneously with EEG recording. A low light infrared camera was used for low light recording.     _____________________________________________________________  HISTORY    Patient is a 53y old  Female who presents with a chief complaint of seizures. (2023 16:12)      PERTINENT MEDICATION:  MEDICATIONS  (STANDING):  heparin   Injectable 5000 Unit(s) SubCutaneous every 12 hours  pantoprazole    Tablet 40 milliGRAM(s) Oral two times a day  phenytoin   Capsule 300 milliGRAM(s) Oral at bedtime  phenytoin   Capsule 200 milliGRAM(s) Oral <User Schedule>    _____________________________________________________________  INTERPRETATION    Findings: The background was continuous, spontaneously variable and reactive. During wakefulness, the posterior dominant rhythm consisted of symmetric, well-modulated 8-9 Hz activity, with amplitude to 30 uV, that attenuated to eye opening.  Low amplitude frontal beta was noted in wakefulness.    Background Slowing:  -Intermittent diffuse theta and polymorphic delta slowing.    Focal Slowing:   -Intermittent polymorphic delta slowing in the left temporal region    Sleep Background:  Drowsiness was characterized by fragmentation, attenuation, and slowing of the background activity.    Sleep was characterized by the presence of vertex waves, symmetric spindles, and K-complexes.    Other Non-Epileptiform Findings:  -Diffuse excess beta activity.    Interictal Epileptiform Activity:   -Rare sharp wave in the left anterior temporal region, maximum F7/T7    Events:  Clinical events: None recorded.  Seizures: None recorded.    Artifacts:  Intermittent myogenic and movement artifacts were noted.    ECG:  The heart rate on single channel ECG was predominantly between 60-80 BPM.    _____________________________________________________________  EEG SUMMARY/CLASSIFICATION    Abnormal EEG in the awake, drowsy and asleep states.  -Rare sharp wave in the left anterior temporal region, maximum F7/T7  -Intermittent polymorphic delta slowing in the left temporal region  -Intermittent diffuse theta and polymorphic delta slowing.  -Diffuse excess beta activity.  _____________________________________________________________  EEG IMPRESSION/CLINICAL CORRELATE    Abnormal EEG study.  1. Increased risk for focal seizures with onset in the left anterior temporal region.  2. Focal dysfunction in the left temporal region.  3. Mild nonspecific diffuse or multifocal cerebral dysfunction.   4. Diffuse excess beta activity may be seen with medication use such as benzodiazepines or barbiturates.  5. No seizures were recorded.    Calixto Gong MD  Neurology Attending Physician

## 2023-11-27 ENCOUNTER — APPOINTMENT (OUTPATIENT)
Dept: NEUROLOGY | Facility: CLINIC | Age: 53
End: 2023-11-27
Payer: COMMERCIAL

## 2023-11-27 ENCOUNTER — RX RENEWAL (OUTPATIENT)
Age: 53
End: 2023-11-27

## 2023-11-27 VITALS
BODY MASS INDEX: 35.08 KG/M2 | HEIGHT: 64 IN | WEIGHT: 205.5 LBS | HEART RATE: 84 BPM | DIASTOLIC BLOOD PRESSURE: 74 MMHG | OXYGEN SATURATION: 99 % | SYSTOLIC BLOOD PRESSURE: 125 MMHG | TEMPERATURE: 97.1 F

## 2023-11-27 PROCEDURE — 99214 OFFICE O/P EST MOD 30 MIN: CPT

## 2023-11-28 PROBLEM — E05.90 THYROTOXICOSIS, UNSPECIFIED WITHOUT THYROTOXIC CRISIS OR STORM: Chronic | Status: ACTIVE | Noted: 2023-11-23

## 2023-11-30 DIAGNOSIS — G40.909 EPILEPSY, UNSPECIFIED, NOT INTRACTABLE, WITHOUT STATUS EPILEPTICUS: ICD-10-CM

## 2023-11-30 DIAGNOSIS — Z91.148 PATIENT'S OTHER NONCOMPLIANCE WITH MEDICATION REGIMEN FOR OTHER REASON: ICD-10-CM

## 2023-11-30 DIAGNOSIS — E05.90 THYROTOXICOSIS, UNSPECIFIED WITHOUT THYROTOXIC CRISIS OR STORM: ICD-10-CM

## 2023-11-30 DIAGNOSIS — E66.9 OBESITY, UNSPECIFIED: ICD-10-CM

## 2023-11-30 DIAGNOSIS — J04.0 ACUTE LARYNGITIS: ICD-10-CM

## 2024-01-23 ENCOUNTER — APPOINTMENT (OUTPATIENT)
Dept: NEUROLOGY | Facility: CLINIC | Age: 54
End: 2024-01-23
Payer: COMMERCIAL

## 2024-01-23 PROCEDURE — 95816 EEG AWAKE AND DROWSY: CPT

## 2024-01-31 ENCOUNTER — RX RENEWAL (OUTPATIENT)
Age: 54
End: 2024-01-31

## 2024-02-27 ENCOUNTER — APPOINTMENT (OUTPATIENT)
Dept: ENDOCRINOLOGY | Facility: CLINIC | Age: 54
End: 2024-02-27
Payer: COMMERCIAL

## 2024-02-27 VITALS
RESPIRATION RATE: 18 BRPM | BODY MASS INDEX: 36.02 KG/M2 | TEMPERATURE: 97.4 F | SYSTOLIC BLOOD PRESSURE: 118 MMHG | WEIGHT: 211 LBS | DIASTOLIC BLOOD PRESSURE: 70 MMHG | OXYGEN SATURATION: 98 % | HEIGHT: 64 IN | HEART RATE: 83 BPM

## 2024-02-27 DIAGNOSIS — E04.1 NONTOXIC SINGLE THYROID NODULE: ICD-10-CM

## 2024-02-27 DIAGNOSIS — R79.89 OTHER SPECIFIED ABNORMAL FINDINGS OF BLOOD CHEMISTRY: ICD-10-CM

## 2024-02-27 PROCEDURE — 99214 OFFICE O/P EST MOD 30 MIN: CPT

## 2024-02-27 RX ORDER — METHIMAZOLE 5 MG/1
5 TABLET ORAL DAILY
Qty: 135 | Refills: 2 | Status: ACTIVE | COMMUNITY
Start: 2023-10-12 | End: 1900-01-01

## 2024-02-27 NOTE — PHYSICAL EXAM
[Alert] : alert [Well Nourished] : well nourished [Obese] : obese [No Acute Distress] : no acute distress [Well Developed] : well developed [Normal Sclera/Conjunctiva] : normal sclera/conjunctiva [EOMI] : extra ocular movement intact [PERRL] : pupils equal, round and reactive to light [No Proptosis] : no proptosis [Normal Hearing] : hearing was normal [Normal Outer Ear/Nose] : the ears and nose were normal in appearance [Supple] : the neck was supple [Thyroid Not Enlarged] : the thyroid was not enlarged [No Respiratory Distress] : no respiratory distress [No Accessory Muscle Use] : no accessory muscle use [Clear to Auscultation] : lungs were clear to auscultation bilaterally [Normal S1, S2] : normal S1 and S2 [Normal Rate] : heart rate was normal [Regular Rhythm] : with a regular rhythm [Carotids Normal] : carotid pulses were normal with no bruits [No Edema] : no peripheral edema [Pedal Pulses Normal] : the pedal pulses are present [Normal Bowel Sounds] : normal bowel sounds [Not Tender] : non-tender [Not Distended] : not distended [Soft] : abdomen soft [Normal Anterior Cervical Nodes] : no anterior cervical lymphadenopathy [No CVA Tenderness] : no ~M costovertebral angle tenderness [No Spinal Tenderness] : no spinal tenderness [Spine Straight] : spine straight [No Stigmata of Cushings Syndrome] : no stigmata of Cushings Syndrome [Normal Gait] : normal gait [Normal Strength/Tone] : muscle strength and tone were normal [No Rash] : no rash [Cranial Nerves Intact] : cranial nerves 2-12 were intact [No Motor Deficits] : the motor exam was normal [Normal Reflexes] : deep tendon reflexes were 2+ and symmetric [No Tremors] : no tremors [Oriented x3] : oriented to person, place, and time [Normal Affect] : the affect was normal [Normal Mood] : the mood was normal [Scoliosis] : no scoliosis [Kyphosis] : no kyphosis present [Acanthosis Nigricans] : no acanthosis nigricans [de-identified] : thyroid nodule- FNA 10/23, repeat in 6 months [de-identified] : hyperthyroidism, obesity

## 2024-02-27 NOTE — ASSESSMENT
[Methimazole Therapy/PTU Therapy] : Risks and benefits of methimazole therapy/PTU therapy were discussed with the patient, including rash, liver dysfunction, and agranulocytosis. Patient was instructed to call the office for flu-like symptoms (e.g. fever and sore-throat) [FreeTextEntry1] : GRAVES TSH 0.01  H WILL INCREASE METHIMAZOLE 5 MG TO 7.5MG DAILY (1.5 TABS) MONITOR TSH NEXT VISIT  THYROID NODULE REFER TO DR. DODSON FOR FOLLOW UP

## 2024-02-27 NOTE — DATA REVIEWED
[FreeTextEntry1] : 2/19/24 [facility] TSH 0.01, FT4 1.0 TOTAL T3 163  , LEVETIRACETAM 7.1 L, PHENYTOIN 8.7 l, VIT D 43

## 2024-02-27 NOTE — HISTORY OF PRESENT ILLNESS
[FreeTextEntry1] : Patient stated had seizure in November & December. Currently has raspy voice. She is seeing ENT. Review labs, continue with Graves disease. Need to see Ophthalmology. Has teary eyes.

## 2024-02-27 NOTE — REVIEW OF SYSTEMS
[Redness] : redness [Negative] : Heme/Lymph [de-identified] : SEIZURES 2 MONTHS AGO [FreeTextEntry3] : WATERY EYES

## 2024-03-01 ENCOUNTER — NON-APPOINTMENT (OUTPATIENT)
Age: 54
End: 2024-03-01

## 2024-03-06 ENCOUNTER — APPOINTMENT (OUTPATIENT)
Dept: NEUROLOGY | Facility: CLINIC | Age: 54
End: 2024-03-06
Payer: COMMERCIAL

## 2024-03-06 VITALS
SYSTOLIC BLOOD PRESSURE: 128 MMHG | BODY MASS INDEX: 36.37 KG/M2 | RESPIRATION RATE: 18 BRPM | TEMPERATURE: 97.9 F | HEART RATE: 88 BPM | DIASTOLIC BLOOD PRESSURE: 80 MMHG | OXYGEN SATURATION: 99 % | HEIGHT: 64 IN | WEIGHT: 213 LBS

## 2024-03-06 DIAGNOSIS — G40.909 EPILEPSY, UNSPECIFIED, NOT INTRACTABLE, W/OUT STATUS EPILEPTICUS: ICD-10-CM

## 2024-03-06 PROCEDURE — G2211 COMPLEX E/M VISIT ADD ON: CPT

## 2024-03-06 PROCEDURE — 99213 OFFICE O/P EST LOW 20 MIN: CPT

## 2024-03-06 RX ORDER — OMEPRAZOLE 40 MG/1
40 CAPSULE, DELAYED RELEASE ORAL
Refills: 0 | Status: ACTIVE | COMMUNITY

## 2024-03-06 RX ORDER — SULFAMETHOXAZOLE AND TRIMETHOPRIM 800; 160 MG/1; MG/1
800-160 TABLET ORAL
Refills: 0 | Status: ACTIVE | COMMUNITY

## 2024-03-08 NOTE — DISCUSSION/SUMMARY
[FreeTextEntry1] : Vani is a 53 year old female with PMH of Seizure disorder and Hyperthyroidism presented for a follow up. Overall doing ok with Keppra slightly tiered. Taking Votit B6 supplementation as prescribed. Will increase Keppra to 750 mgs BID. Will start to decrease Dilantin to 200 mgs BID. Blood work in 1-2 months. Will talk on the phone about further adjustments based on levels. Follow up in 6 month with REEG. Pt will call if any issues arise prior to next visit.   Case discussed with Dr. Bradford Jeffery, DNP, ACNP-BC

## 2024-03-08 NOTE — HISTORY OF PRESENT ILLNESS
[FreeTextEntry1] : Vani is a 53 year old female with PMH of Seizure disorder and Hyperthyroidism presented for a follow up. She had a breakthrough seizure 11/2023. Was seen in the office 11/2023 and started transition from Dilantin to Keppra.  She is now taking Keppra 500 mgs BID. With level 7.1 Continues to take Dilantin 200 in am and 300 in pm  She continues to follow up with ENT regarding her lost voice. Was instructed to start Bactrim for 2 weeks starting today.   REEG 1/23/24 unremarkable

## 2024-03-08 NOTE — PHYSICAL EXAM
[Paresis Pronator Drift Right-Sided] : no pronator drift on the right [Paresis Pronator Drift Left-Sided] : no pronator drift on the left [Motor Strength Lower Extremities Bilaterally] : strength was normal in both lower extremities [Motor Strength Upper Extremities Bilaterally] : strength was normal in both upper extremities [Romberg's Sign] : Romberg's sign was negtive [Limited Balance] : balance was intact [Past-pointing] : there was no past-pointing [Tremor] : no tremor present

## 2024-04-18 ENCOUNTER — NON-APPOINTMENT (OUTPATIENT)
Age: 54
End: 2024-04-18

## 2024-05-02 ENCOUNTER — RX RENEWAL (OUTPATIENT)
Age: 54
End: 2024-05-02

## 2024-05-07 ENCOUNTER — APPOINTMENT (OUTPATIENT)
Dept: ORTHOPEDIC SURGERY | Facility: CLINIC | Age: 54
End: 2024-05-07
Payer: COMMERCIAL

## 2024-05-07 VITALS — HEIGHT: 64 IN | BODY MASS INDEX: 35.85 KG/M2 | WEIGHT: 210 LBS

## 2024-05-07 DIAGNOSIS — S93.401A SPRAIN OF UNSPECIFIED LIGAMENT OF RIGHT ANKLE, INITIAL ENCOUNTER: ICD-10-CM

## 2024-05-07 DIAGNOSIS — S39.012A STRAIN OF MUSCLE, FASCIA AND TENDON OF LOWER BACK, INITIAL ENCOUNTER: ICD-10-CM

## 2024-05-07 DIAGNOSIS — S16.1XXA STRAIN OF MUSCLE, FASCIA AND TENDON AT NECK LEVEL, INITIAL ENCOUNTER: ICD-10-CM

## 2024-05-07 DIAGNOSIS — S89.91XA UNSPECIFIED INJURY OF RIGHT LOWER LEG, INITIAL ENCOUNTER: ICD-10-CM

## 2024-05-07 PROCEDURE — 73562 X-RAY EXAM OF KNEE 3: CPT | Mod: RT

## 2024-05-07 PROCEDURE — 72100 X-RAY EXAM L-S SPINE 2/3 VWS: CPT

## 2024-05-07 PROCEDURE — 73610 X-RAY EXAM OF ANKLE: CPT | Mod: RT

## 2024-05-07 PROCEDURE — 99203 OFFICE O/P NEW LOW 30 MIN: CPT | Mod: 25

## 2024-05-07 PROCEDURE — 72050 X-RAY EXAM NECK SPINE 4/5VWS: CPT

## 2024-05-07 RX ORDER — IBUPROFEN 800 MG/1
800 TABLET, FILM COATED ORAL 3 TIMES DAILY
Qty: 90 | Refills: 0 | Status: ACTIVE | COMMUNITY
Start: 2024-05-07 | End: 1900-01-01

## 2024-05-07 NOTE — DISCUSSION/SUMMARY
[de-identified] : Patient has various strains and contusions throughout the body.  X-rays were discussed in depth.  No acute fractures.  Not concerned for ligamentous tear.  Shortness and strains and contusions take a total of 4 to 6 weeks to fully heal and that the first 2 weeks was the worst in terms of pain and swelling.  I recommended anti-inflammatory medication.  Ibuprofen 800 sent to patient's pharmacy to be taken as needed for pain.  Confirmed no contraindication to NSAIDs.  Risks and benefits discussed.  Offered a prescription for therapy however she kindly declined.  I recommended patient rest, ice, compress, and elevate the regularly. Encouraged activity modification as tolerable. Encouraged gentle range of motion to avoid stiffness  All questions and concerns addressed to patient's satisfaction. Patient expresses full understanding of treatment plan. I will have the patient follow-up in 3 to 4 weeks with Kadeem regarding her right knee as she feels as if this is causing her the most amount of discomfort/pain.

## 2024-05-07 NOTE — IMAGING
[de-identified] : Physical examination of the right knee: Mild swelling appreciated laterally greater than medially.  No ecchymosis or erythema appreciated.  Skin is intact.  Patient mildly tender to palpation along the medial and lateral joint lines.  Positive patellar grind.  Calf is soft and nontender.  Nontender over the patella tendon or quad tendon.  Able to straight leg raise off the table.  Range of motion limited secondary to pain and swelling.  Negative Kim's.  Negative Lachman's.  Sensorimotor intact distally.  Neurovascular intact.  Antalgic gait.  Physical examination of the right ankle: Mild swelling appreciated laterally greater than medially.  No ecchymosis or erythema appreciated.  Skin is intact.  Patient tender to palpation over the ATFL.  No tenderness over the lateral or medial malleolus.  No tenderness of the deltoid ligament, PT FL, or CFL ligaments.  Stable to varus and valgus stress.  Able to bear weight and ambulate at this time however experiences pain when doing so.  Sensorimotor intact distally.  Neurovascularly intact.  Physical examination follow-up the right lumbar spine: No swelling, ecchymosis, erythema appreciated.  Skin is intact.  Tense palpation of right lumbar musculature.  Full range of motion.  Negative straight leg raise.  Nonantalgic gait.  Sensorimotor intact distally.  Neuro vas intact.  X-rays cervical and lumbar spine, right knee, and right ankle:  No acute fractures, subluxations, or dislocations.  Osteoarthritic changes appreciated throughout.

## 2024-05-07 NOTE — HISTORY OF PRESENT ILLNESS
[de-identified] : 54-year-old female here for evaluation of right-sided neck/lumbar/knee/ankle pain secondary to a fall that occurred at work earlier today.  She denies any numbness or tingling.  Denies any instability.  Reports her pain is well-controlled but wanted to rule out any fractures.

## 2024-05-23 ENCOUNTER — APPOINTMENT (OUTPATIENT)
Dept: ENDOCRINOLOGY | Facility: CLINIC | Age: 54
End: 2024-05-23

## 2024-05-24 ENCOUNTER — NON-APPOINTMENT (OUTPATIENT)
Age: 54
End: 2024-05-24

## 2024-05-24 LAB
LEVETIRACETAM SERPL-MCNC: 21.5 UG/ML
PHENYTOIN SERPL QL: 4.6 UG/ML

## 2024-05-28 ENCOUNTER — RX RENEWAL (OUTPATIENT)
Age: 54
End: 2024-05-28

## 2024-05-28 ENCOUNTER — NON-APPOINTMENT (OUTPATIENT)
Age: 54
End: 2024-05-28

## 2024-05-29 ENCOUNTER — RESULT CHARGE (OUTPATIENT)
Age: 54
End: 2024-05-29

## 2024-05-29 ENCOUNTER — APPOINTMENT (OUTPATIENT)
Dept: CARDIOLOGY | Facility: CLINIC | Age: 54
End: 2024-05-29
Payer: COMMERCIAL

## 2024-05-29 VITALS
HEART RATE: 77 BPM | BODY MASS INDEX: 35 KG/M2 | WEIGHT: 205 LBS | HEIGHT: 64 IN | DIASTOLIC BLOOD PRESSURE: 82 MMHG | SYSTOLIC BLOOD PRESSURE: 128 MMHG

## 2024-05-29 DIAGNOSIS — E05.00 THYROTOXICOSIS WITH DIFFUSE GOITER W/OUT THYROTOXIC CRISIS OR STORM: ICD-10-CM

## 2024-05-29 DIAGNOSIS — F17.200 NICOTINE DEPENDENCE, UNSPECIFIED, UNCOMPLICATED: ICD-10-CM

## 2024-05-29 DIAGNOSIS — E66.9 OBESITY, UNSPECIFIED: ICD-10-CM

## 2024-05-29 DIAGNOSIS — R01.1 CARDIAC MURMUR, UNSPECIFIED: ICD-10-CM

## 2024-05-29 DIAGNOSIS — E05.90 THYROTOXICOSIS, UNSPECIFIED W/OUT THYROTOXIC CRISIS OR STORM: ICD-10-CM

## 2024-05-29 PROCEDURE — 93000 ELECTROCARDIOGRAM COMPLETE: CPT

## 2024-05-29 PROCEDURE — 99204 OFFICE O/P NEW MOD 45 MIN: CPT | Mod: 25

## 2024-05-29 PROCEDURE — 99406 BEHAV CHNG SMOKING 3-10 MIN: CPT

## 2024-05-29 RX ORDER — LEVETIRACETAM 500 MG/1
500 TABLET, FILM COATED ORAL
Qty: 270 | Refills: 1 | Status: DISCONTINUED | COMMUNITY
Start: 2023-11-27 | End: 2024-05-29

## 2024-05-29 RX ORDER — EXTENDED PHENYTOIN SODIUM 100 MG/1
100 CAPSULE ORAL
Qty: 450 | Refills: 0 | Status: DISCONTINUED | COMMUNITY
Start: 2022-05-27 | End: 2024-05-29

## 2024-05-29 RX ORDER — EXTENDED PHENYTOIN SODIUM 100 MG/1
100 CAPSULE ORAL 3 TIMES DAILY
Refills: 0 | Status: ACTIVE | COMMUNITY

## 2024-05-29 RX ORDER — LEVETIRACETAM 750 MG/1
750 TABLET, FILM COATED ORAL TWICE DAILY
Refills: 0 | Status: ACTIVE | COMMUNITY

## 2024-05-29 NOTE — PHYSICAL EXAM
[Well Developed] : well developed [Well Nourished] : well nourished [No Acute Distress] : no acute distress [Normal Conjunctiva] : normal conjunctiva [Normal Venous Pressure] : normal venous pressure [No Carotid Bruit] : no carotid bruit [Normal S1, S2] : normal S1, S2 [No Murmur] : no murmur [No Rub] : no rub [No Gallop] : no gallop [Clear Lung Fields] : clear lung fields [Good Air Entry] : good air entry [No Respiratory Distress] : no respiratory distress  [Soft] : abdomen soft [Normal Gait] : normal gait [No Edema] : no edema [No Rash] : no rash [No Skin Lesions] : no skin lesions [Moves all extremities] : moves all extremities [No Focal Deficits] : no focal deficits [Normal Speech] : normal speech [Alert and Oriented] : alert and oriented [Normal memory] : normal memory [Murmur] : murmur [de-identified] : I/VI systolic murmur at RUSB

## 2024-05-29 NOTE — ASSESSMENT
[FreeTextEntry1] : Ms. TED ROSE is a 54 year old woman with PMHx of hyperthyroidism and seizure disorder who is presenting for evaluation of a murmur. Suspect that her murmur is benign "flow" murmur due to hyperthyroidism.   -Order TTE to rule out structural changes and eval for valvular disorder -If TTE normal, RTC PRN or for ASCVD risk stratification -Discussed the risks of continued smoking and the cardiovascular benefits of quitting. Encouraged complete smoking cessation. -Encouraged improved lifestyle modifications with these recommendations below: -Plant-based and Mediterranean diets, along with increased fruit, nut, vegetable, legume, and lean vegetable or animal protein (preferably fish) consumption -Engage in at least 150 minutes per week of accumulated moderate-intensity aerobic physical activity or 75 minutes per week of vigorous-intensity aerobic physical activity  Time in encounter, including face to face visit and time reviewing chart: 50  minutes  Marquis Vickers MD, FACC Non-Invasive Cardiology [address], [address] Office: [phone]

## 2024-05-29 NOTE — REASON FOR VISIT
[FreeTextEntry1] : Ms. TED ROSE is a 54 year old woman with PMHx of hyperthyroidism and seizure disorder who is presenting for evaluation of a murmur. She was referred from Dr John. She has no cardiovascular concerns. She has no chest pain, SOB, syncope, lightheadedness or palpitations. She has no limitations with exertion.   She had never been told she had a murmur until recently (by Dr John, and in urgent care). Of note, her hyperthyroidism has been uncontrolled with a  TSH 0. She is following with endocrinology.

## 2024-05-30 ENCOUNTER — NON-APPOINTMENT (OUTPATIENT)
Age: 54
End: 2024-05-30

## 2024-05-31 ENCOUNTER — APPOINTMENT (OUTPATIENT)
Dept: ORTHOPEDIC SURGERY | Facility: CLINIC | Age: 54
End: 2024-05-31

## 2024-06-10 ENCOUNTER — APPOINTMENT (OUTPATIENT)
Dept: ENDOCRINOLOGY | Facility: CLINIC | Age: 54
End: 2024-06-10

## 2024-06-20 LAB
ALBUMIN SERPL ELPH-MCNC: 4.3 G/DL
ALP BLD-CCNC: 221 U/L
ALT SERPL-CCNC: 29 U/L
ANION GAP SERPL CALC-SCNC: 12 MMOL/L
AST SERPL-CCNC: 23 U/L
BILIRUB SERPL-MCNC: 0.3 MG/DL
BUN SERPL-MCNC: 15 MG/DL
CALCIUM SERPL-MCNC: 9.3 MG/DL
CHLORIDE SERPL-SCNC: 105 MMOL/L
CO2 SERPL-SCNC: 23 MMOL/L
CREAT SERPL-MCNC: 0.7 MG/DL
EGFR: 103 ML/MIN/1.73M2
GLUCOSE SERPL-MCNC: 91 MG/DL
HCT VFR BLD CALC: 43.4 %
HGB BLD-MCNC: 14.3 G/DL
MCHC RBC-ENTMCNC: 30.2 PG
MCHC RBC-ENTMCNC: 32.9 G/DL
MCV RBC AUTO: 91.8 FL
PLATELET # BLD AUTO: 259 K/UL
PMV BLD AUTO: 0 /100 WBCS
PMV BLD: 10.9 FL
POTASSIUM SERPL-SCNC: 4.3 MMOL/L
PROT SERPL-MCNC: 6.6 G/DL
RBC # BLD: 4.73 M/UL
RBC # FLD: 12.7 %
SODIUM SERPL-SCNC: 140 MMOL/L
T3 SERPL-MCNC: 170 NG/DL
T4 FREE SERPL-MCNC: 1.3 NG/DL
THYROGLOB AB SERPL-ACNC: <20 IU/ML
THYROPEROXIDASE AB SERPL IA-ACNC: <10 IU/ML
TSH SERPL-ACNC: <0 UIU/ML
TSI ACT/NOR SER: 0.39 IU/L
WBC # FLD AUTO: 7.3 K/UL

## 2024-06-28 ENCOUNTER — APPOINTMENT (OUTPATIENT)
Dept: CARDIOLOGY | Facility: CLINIC | Age: 54
End: 2024-06-28
Payer: COMMERCIAL

## 2024-06-28 PROCEDURE — 93306 TTE W/DOPPLER COMPLETE: CPT

## 2024-07-02 ENCOUNTER — APPOINTMENT (OUTPATIENT)
Dept: NEUROLOGY | Facility: CLINIC | Age: 54
End: 2024-07-02
Payer: COMMERCIAL

## 2024-07-02 PROCEDURE — 95816 EEG AWAKE AND DROWSY: CPT

## 2024-07-09 ENCOUNTER — APPOINTMENT (OUTPATIENT)
Dept: OBGYN | Facility: CLINIC | Age: 54
End: 2024-07-09
Payer: COMMERCIAL

## 2024-07-09 VITALS
DIASTOLIC BLOOD PRESSURE: 82 MMHG | HEIGHT: 64 IN | SYSTOLIC BLOOD PRESSURE: 128 MMHG | BODY MASS INDEX: 35.68 KG/M2 | HEART RATE: 81 BPM | WEIGHT: 209 LBS

## 2024-07-09 DIAGNOSIS — R23.2 FLUSHING: ICD-10-CM

## 2024-07-09 PROCEDURE — 99204 OFFICE O/P NEW MOD 45 MIN: CPT

## 2024-07-10 LAB
ANTI-MUELLERIAN HORMONE: 0.03 NG/ML
ESTRADIOL SERPL-MCNC: <5 PG/ML
FSH SERPL-MCNC: 28.7 IU/L
LH SERPL-ACNC: 13.8 IU/L
PROGEST SERPL-MCNC: 0.26 NG/ML
PROLACTIN SERPL-MCNC: 6.6 NG/ML
SHBG SERPL-SCNC: 89.5 NMOL/L
T3FREE SERPL-MCNC: 4.35 PG/ML
T4 FREE SERPL-MCNC: 1.6 NG/DL
TSH SERPL-ACNC: <0 UIU/ML

## 2024-07-11 LAB
THYROGLOB AB SERPL-ACNC: <20 IU/ML
THYROPEROXIDASE AB SERPL IA-ACNC: <10 IU/ML

## 2024-07-15 LAB — DHEA SERPL-MCNC: 41 NG/DL

## 2024-07-16 LAB — TESTOST SERPL-MCNC: 4.2 NG/DL

## 2024-08-26 ENCOUNTER — APPOINTMENT (OUTPATIENT)
Dept: NEUROLOGY | Facility: CLINIC | Age: 54
End: 2024-08-26
Payer: COMMERCIAL

## 2024-08-26 VITALS
RESPIRATION RATE: 12 BRPM | SYSTOLIC BLOOD PRESSURE: 133 MMHG | BODY MASS INDEX: 36.7 KG/M2 | DIASTOLIC BLOOD PRESSURE: 89 MMHG | HEIGHT: 64 IN | HEART RATE: 74 BPM | WEIGHT: 215 LBS | OXYGEN SATURATION: 98 %

## 2024-08-26 DIAGNOSIS — E05.00 THYROTOXICOSIS WITH DIFFUSE GOITER W/OUT THYROTOXIC CRISIS OR STORM: ICD-10-CM

## 2024-08-26 DIAGNOSIS — G40.909 EPILEPSY, UNSPECIFIED, NOT INTRACTABLE, W/OUT STATUS EPILEPTICUS: ICD-10-CM

## 2024-08-26 PROCEDURE — 95816 EEG AWAKE AND DROWSY: CPT

## 2024-08-26 PROCEDURE — 99214 OFFICE O/P EST MOD 30 MIN: CPT

## 2024-08-26 RX ORDER — ESCITALOPRAM OXALATE 10 MG/1
10 TABLET ORAL
Qty: 30 | Refills: 3 | Status: ACTIVE | COMMUNITY
Start: 2024-08-26 | End: 1900-01-01

## 2024-08-26 NOTE — ASSESSMENT
[FreeTextEntry1] : 1- seizure D/O 2-Hypothyroidism 3- mood D/O   Plan make the Dilantin one cap every other day for a week and then stop it continue the keppra start Lexapro 5 mg and increase to 1- mg speak on the phone  discussed the mood and sleep zf/U

## 2024-08-26 NOTE — HISTORY OF PRESENT ILLNESS
[FreeTextEntry1] : Vani is here for the F/U she reports having an event < stumbling > at thee end of the June the last day of school She also had another event in July that she felt dizzy and had to sit down and perhaps also had some drooling he says she stopped her menstruations in March and still at times including in those two occasions she was having some pre-menstruation signs and symptoms. She is also quite emotional and tearful. She believes it is related to the changes that she is going through and also her hot flashes and lack of sleep and a the end also family issues and losing her Ex mother in law and the fact that she is prevented by her EX to go there... Another  issues is also seeing her uncle . it is emotional because she was adapted NO spinal pain for her thyroid she would be seeing endo in couple of months  She gained about 10 pounds She did have a blood test done normal CBC and CMP and vit D Keppra level= 24 Dilantin= 1.1

## 2024-08-26 NOTE — PHYSICAL EXAM
[FreeTextEntry1] : A/A/Ox3 good attention emotional and tearful follows 4 step commands normal language CN- normal no drift no dysmetria negative Romberg

## 2024-09-25 ENCOUNTER — RX RENEWAL (OUTPATIENT)
Age: 54
End: 2024-09-25

## 2024-10-01 ENCOUNTER — APPOINTMENT (OUTPATIENT)
Dept: OBGYN | Facility: CLINIC | Age: 54
End: 2024-10-01
Payer: COMMERCIAL

## 2024-10-01 VITALS
HEIGHT: 64 IN | WEIGHT: 215 LBS | BODY MASS INDEX: 36.7 KG/M2 | HEART RATE: 68 BPM | SYSTOLIC BLOOD PRESSURE: 132 MMHG | DIASTOLIC BLOOD PRESSURE: 82 MMHG

## 2024-10-01 DIAGNOSIS — Z01.419 ENCOUNTER FOR GYNECOLOGICAL EXAMINATION (GENERAL) (ROUTINE) W/OUT ABNORMAL FINDINGS: ICD-10-CM

## 2024-10-01 PROCEDURE — 99396 PREV VISIT EST AGE 40-64: CPT

## 2024-10-01 NOTE — HISTORY OF PRESENT ILLNESS
[Patient reported mammogram was normal] : Patient reported mammogram was normal [Patient reported PAP Smear was normal] : Patient reported PAP Smear was normal [Patient reported colonoscopy was normal] : Patient reported colonoscopy was normal [Y] : Positive pregnancy history [TextBox_4] : history of small fibroids, no cysts, or STDs [Mammogramdate] : 8-2023 [Papeardate] : 8-2023 [ColonoscopyDate] : 7-2024 [PGHxTotal] : 2 [FreeTextEntry1] : twins   , SAB X1

## 2024-10-01 NOTE — DISCUSSION/SUMMARY
[FreeTextEntry1] : 54-year-old -0-1-2 annual GYN, history of seizure disorder, vulvar lesions Pap HPV Mammogram vulvar hydrocortisone topical  for 2-3 weeks rto 3-4 weeks for reevaluation, possible biopsy if not improved

## 2024-10-03 LAB — HPV HIGH+LOW RISK DNA PNL CVX: NOT DETECTED

## 2024-10-08 ENCOUNTER — APPOINTMENT (OUTPATIENT)
Dept: ENDOCRINOLOGY | Facility: CLINIC | Age: 54
End: 2024-10-08
Payer: COMMERCIAL

## 2024-10-08 VITALS
TEMPERATURE: 96.2 F | BODY MASS INDEX: 37.05 KG/M2 | HEART RATE: 81 BPM | DIASTOLIC BLOOD PRESSURE: 80 MMHG | HEIGHT: 64 IN | OXYGEN SATURATION: 94 % | SYSTOLIC BLOOD PRESSURE: 120 MMHG | WEIGHT: 217 LBS

## 2024-10-08 DIAGNOSIS — E05.90 THYROTOXICOSIS, UNSPECIFIED W/OUT THYROTOXIC CRISIS OR STORM: ICD-10-CM

## 2024-10-08 DIAGNOSIS — E05.00 THYROTOXICOSIS WITH DIFFUSE GOITER W/OUT THYROTOXIC CRISIS OR STORM: ICD-10-CM

## 2024-10-08 DIAGNOSIS — D35.00 BENIGN NEOPLASM OF UNSPECIFIED ADRENAL GLAND: ICD-10-CM

## 2024-10-08 DIAGNOSIS — E66.09 OTHER OBESITY DUE TO EXCESS CALORIES: ICD-10-CM

## 2024-10-08 DIAGNOSIS — E66.9 OBESITY, UNSPECIFIED: ICD-10-CM

## 2024-10-08 DIAGNOSIS — E04.2 NONTOXIC MULTINODULAR GOITER: ICD-10-CM

## 2024-10-08 PROCEDURE — 99205 OFFICE O/P NEW HI 60 MIN: CPT

## 2024-10-08 RX ORDER — DEXAMETHASONE 1 MG/1
1 TABLET ORAL
Qty: 1 | Refills: 0 | Status: ACTIVE | COMMUNITY
Start: 2024-10-08 | End: 1900-01-01

## 2024-10-09 LAB — CYTOLOGY CVX/VAG DOC THIN PREP: NORMAL

## 2024-10-09 RX ORDER — LEVETIRACETAM 100 MG/ML
100 SOLUTION ORAL
Refills: 0 | Status: ACTIVE | COMMUNITY

## 2024-10-14 LAB
ACTH SER-ACNC: <1.5 PG/ML
CORTIS SERPL-MCNC: 1.8 UG/DL

## 2024-10-24 ENCOUNTER — APPOINTMENT (OUTPATIENT)
Dept: OBGYN | Facility: CLINIC | Age: 54
End: 2024-10-24
Payer: COMMERCIAL

## 2024-10-24 ENCOUNTER — APPOINTMENT (OUTPATIENT)
Dept: OBGYN | Facility: CLINIC | Age: 54
End: 2024-10-24

## 2024-10-24 VITALS
DIASTOLIC BLOOD PRESSURE: 82 MMHG | BODY MASS INDEX: 37.39 KG/M2 | SYSTOLIC BLOOD PRESSURE: 132 MMHG | HEART RATE: 81 BPM | HEIGHT: 64 IN | WEIGHT: 219 LBS

## 2024-10-24 PROBLEM — N92.6 IRREGULAR PERIODS: Status: ACTIVE | Noted: 2024-10-24

## 2024-10-24 PROCEDURE — 99213 OFFICE O/P EST LOW 20 MIN: CPT

## 2024-10-30 ENCOUNTER — APPOINTMENT (OUTPATIENT)
Dept: OBGYN | Facility: CLINIC | Age: 54
End: 2024-10-30
Payer: COMMERCIAL

## 2024-10-30 DIAGNOSIS — N92.6 IRREGULAR MENSTRUATION, UNSPECIFIED: ICD-10-CM

## 2024-10-30 PROCEDURE — 76830 TRANSVAGINAL US NON-OB: CPT

## 2024-11-01 RX ORDER — LEVETIRACETAM 750 MG/1
750 TABLET, EXTENDED RELEASE ORAL
Qty: 270 | Refills: 1 | Status: ACTIVE | COMMUNITY
Start: 2024-11-01 | End: 1900-01-01

## 2024-11-09 ENCOUNTER — NON-APPOINTMENT (OUTPATIENT)
Age: 54
End: 2024-11-09

## 2024-11-28 ENCOUNTER — RX RENEWAL (OUTPATIENT)
Age: 54
End: 2024-11-28

## 2024-11-28 RX ORDER — METHIMAZOLE 5 MG/1
5 TABLET ORAL
Qty: 180 | Refills: 0 | Status: ACTIVE | COMMUNITY
Start: 2024-11-28 | End: 1900-01-01

## 2024-12-03 ENCOUNTER — APPOINTMENT (OUTPATIENT)
Dept: OBGYN | Facility: CLINIC | Age: 54
End: 2024-12-03
Payer: COMMERCIAL

## 2024-12-03 VITALS
SYSTOLIC BLOOD PRESSURE: 130 MMHG | HEIGHT: 64 IN | BODY MASS INDEX: 37.22 KG/M2 | WEIGHT: 218 LBS | DIASTOLIC BLOOD PRESSURE: 78 MMHG | HEART RATE: 80 BPM

## 2024-12-03 PROCEDURE — 99213 OFFICE O/P EST LOW 20 MIN: CPT | Mod: 25

## 2024-12-03 RX ORDER — NORETHINDRONE ACETATE 5 MG/1
5 TABLET ORAL AT BEDTIME
Qty: 30 | Refills: 0 | Status: ACTIVE | COMMUNITY
Start: 2024-12-03 | End: 1900-01-01

## 2024-12-04 ENCOUNTER — APPOINTMENT (OUTPATIENT)
Dept: NEUROLOGY | Facility: CLINIC | Age: 54
End: 2024-12-04
Payer: COMMERCIAL

## 2024-12-04 VITALS
HEIGHT: 64 IN | DIASTOLIC BLOOD PRESSURE: 87 MMHG | RESPIRATION RATE: 18 BRPM | BODY MASS INDEX: 37.56 KG/M2 | HEART RATE: 82 BPM | WEIGHT: 220 LBS | SYSTOLIC BLOOD PRESSURE: 132 MMHG | OXYGEN SATURATION: 99 %

## 2024-12-04 DIAGNOSIS — G40.909 EPILEPSY, UNSPECIFIED, NOT INTRACTABLE, W/OUT STATUS EPILEPTICUS: ICD-10-CM

## 2024-12-04 DIAGNOSIS — F39 UNSPECIFIED MOOD [AFFECTIVE] DISORDER: ICD-10-CM

## 2024-12-04 PROCEDURE — G2211 COMPLEX E/M VISIT ADD ON: CPT | Mod: NC

## 2024-12-04 PROCEDURE — 99213 OFFICE O/P EST LOW 20 MIN: CPT

## 2024-12-09 ENCOUNTER — EMERGENCY (EMERGENCY)
Facility: HOSPITAL | Age: 54
LOS: 0 days | Discharge: HOME | End: 2024-12-09
Attending: EMERGENCY MEDICINE
Payer: COMMERCIAL

## 2024-12-09 VITALS
OXYGEN SATURATION: 98 % | HEART RATE: 76 BPM | SYSTOLIC BLOOD PRESSURE: 148 MMHG | RESPIRATION RATE: 19 BRPM | TEMPERATURE: 98 F | DIASTOLIC BLOOD PRESSURE: 66 MMHG
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
## 2024-12-09 DIAGNOSIS — Z90.721 ACQUIRED ABSENCE OF OVARIES, UNILATERAL: Chronic | ICD-10-CM

## 2024-12-09 DIAGNOSIS — R55 SYNCOPE AND COLLAPSE: ICD-10-CM

## 2024-12-09 DIAGNOSIS — S01.01XA LACERATION WITHOUT FOREIGN BODY OF SCALP, INITIAL ENCOUNTER: ICD-10-CM

## 2024-12-09 DIAGNOSIS — S01.512A LACERATION WITHOUT FOREIGN BODY OF ORAL CAVITY, INITIAL ENCOUNTER: ICD-10-CM

## 2024-12-09 DIAGNOSIS — Z90.79 ACQUIRED ABSENCE OF OTHER GENITAL ORGAN(S): Chronic | ICD-10-CM
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
## 2024-12-09 DIAGNOSIS — E05.90 THYROTOXICOSIS, UNSPECIFIED WITHOUT THYROTOXIC CRISIS OR STORM: ICD-10-CM

## 2024-12-09 DIAGNOSIS — G40.909 EPILEPSY, UNSPECIFIED, NOT INTRACTABLE, WITHOUT STATUS EPILEPTICUS: ICD-10-CM
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
## 2024-12-09 DIAGNOSIS — F17.210 NICOTINE DEPENDENCE, CIGARETTES, UNCOMPLICATED: ICD-10-CM

## 2024-12-09 LAB
ALBUMIN SERPL ELPH-MCNC: 4.3 G/DL — SIGNIFICANT CHANGE UP (ref 3.5–5.2)
ALP SERPL-CCNC: 172 U/L — HIGH (ref 30–115)
ALT FLD-CCNC: 42 U/L — HIGH (ref 0–41)
ANION GAP SERPL CALC-SCNC: 14 MMOL/L — SIGNIFICANT CHANGE UP (ref 7–14)
APPEARANCE UR: CLEAR — SIGNIFICANT CHANGE UP
AST SERPL-CCNC: 28 U/L — SIGNIFICANT CHANGE UP (ref 0–41)
BASOPHILS # BLD AUTO: 0.07 K/UL — SIGNIFICANT CHANGE UP (ref 0–0.2)
BASOPHILS NFR BLD AUTO: 0.7 % — SIGNIFICANT CHANGE UP (ref 0–1)
BILIRUB SERPL-MCNC: 0.3 MG/DL — SIGNIFICANT CHANGE UP (ref 0.2–1.2)
BILIRUB UR-MCNC: NEGATIVE — SIGNIFICANT CHANGE UP
BUN SERPL-MCNC: 18 MG/DL — SIGNIFICANT CHANGE UP (ref 10–20)
CALCIUM SERPL-MCNC: 9.1 MG/DL — SIGNIFICANT CHANGE UP (ref 8.4–10.5)
CHLORIDE SERPL-SCNC: 100 MMOL/L — SIGNIFICANT CHANGE UP (ref 98–110)
CO2 SERPL-SCNC: 21 MMOL/L — SIGNIFICANT CHANGE UP (ref 17–32)
COLOR SPEC: YELLOW — SIGNIFICANT CHANGE UP
CREAT SERPL-MCNC: 0.7 MG/DL — SIGNIFICANT CHANGE UP (ref 0.7–1.5)
DIFF PNL FLD: ABNORMAL
EGFR: 103 ML/MIN/1.73M2 — SIGNIFICANT CHANGE UP
EOSINOPHIL # BLD AUTO: 0.05 K/UL — SIGNIFICANT CHANGE UP (ref 0–0.7)
EOSINOPHIL NFR BLD AUTO: 0.5 % — SIGNIFICANT CHANGE UP (ref 0–8)
GLUCOSE SERPL-MCNC: 150 MG/DL — HIGH (ref 70–99)
GLUCOSE UR QL: NEGATIVE MG/DL — SIGNIFICANT CHANGE UP
HCG SERPL QL: NEGATIVE — SIGNIFICANT CHANGE UP
HCT VFR BLD CALC: 42.7 % — SIGNIFICANT CHANGE UP (ref 37–47)
HGB BLD-MCNC: 14.2 G/DL — SIGNIFICANT CHANGE UP (ref 12–16)
IMM GRANULOCYTES NFR BLD AUTO: 0.3 % — SIGNIFICANT CHANGE UP (ref 0.1–0.3)
KETONES UR-MCNC: NEGATIVE MG/DL — SIGNIFICANT CHANGE UP
LACTATE SERPL-SCNC: 1.9 MMOL/L — SIGNIFICANT CHANGE UP (ref 0.7–2)
LEUKOCYTE ESTERASE UR-ACNC: NEGATIVE — SIGNIFICANT CHANGE UP
LYMPHOCYTES # BLD AUTO: 1.84 K/UL — SIGNIFICANT CHANGE UP (ref 1.2–3.4)
LYMPHOCYTES # BLD AUTO: 17.2 % — LOW (ref 20.5–51.1)
MAGNESIUM SERPL-MCNC: 2.2 MG/DL — SIGNIFICANT CHANGE UP (ref 1.8–2.4)
MCHC RBC-ENTMCNC: 30 PG — SIGNIFICANT CHANGE UP (ref 27–31)
MCHC RBC-ENTMCNC: 33.3 G/DL — SIGNIFICANT CHANGE UP (ref 32–37)
MCV RBC AUTO: 90.3 FL — SIGNIFICANT CHANGE UP (ref 81–99)
MONOCYTES # BLD AUTO: 0.34 K/UL — SIGNIFICANT CHANGE UP (ref 0.1–0.6)
MONOCYTES NFR BLD AUTO: 3.2 % — SIGNIFICANT CHANGE UP (ref 1.7–9.3)
NEUTROPHILS # BLD AUTO: 8.37 K/UL — HIGH (ref 1.4–6.5)
NEUTROPHILS NFR BLD AUTO: 78.1 % — HIGH (ref 42.2–75.2)
NITRITE UR-MCNC: NEGATIVE — SIGNIFICANT CHANGE UP
NRBC # BLD: 0 /100 WBCS — SIGNIFICANT CHANGE UP (ref 0–0)
PH UR: 6 — SIGNIFICANT CHANGE UP (ref 5–8)
PLATELET # BLD AUTO: 265 K/UL — SIGNIFICANT CHANGE UP (ref 130–400)
PMV BLD: 11 FL — HIGH (ref 7.4–10.4)
POTASSIUM SERPL-MCNC: 4 MMOL/L — SIGNIFICANT CHANGE UP (ref 3.5–5)
POTASSIUM SERPL-SCNC: 4 MMOL/L — SIGNIFICANT CHANGE UP (ref 3.5–5)
PROT SERPL-MCNC: 6.6 G/DL — SIGNIFICANT CHANGE UP (ref 6–8)
PROT UR-MCNC: NEGATIVE MG/DL — SIGNIFICANT CHANGE UP
RBC # BLD: 4.73 M/UL — SIGNIFICANT CHANGE UP (ref 4.2–5.4)
RBC # FLD: 12.8 % — SIGNIFICANT CHANGE UP (ref 11.5–14.5)
SODIUM SERPL-SCNC: 135 MMOL/L — SIGNIFICANT CHANGE UP (ref 135–146)
SP GR SPEC: 1.01 — SIGNIFICANT CHANGE UP (ref 1–1.03)
TROPONIN T, HIGH SENSITIVITY RESULT: <6 NG/L — SIGNIFICANT CHANGE UP (ref 6–13)
TROPONIN T, HIGH SENSITIVITY RESULT: <6 NG/L — SIGNIFICANT CHANGE UP (ref 6–13)
UROBILINOGEN FLD QL: 0.2 MG/DL — SIGNIFICANT CHANGE UP (ref 0.2–1)
WBC # BLD: 10.7 K/UL — SIGNIFICANT CHANGE UP (ref 4.8–10.8)
WBC # FLD AUTO: 10.7 K/UL — SIGNIFICANT CHANGE UP (ref 4.8–10.8)

## 2024-12-09 PROCEDURE — 71045 X-RAY EXAM CHEST 1 VIEW: CPT

## 2024-12-09 PROCEDURE — 93005 ELECTROCARDIOGRAM TRACING: CPT

## 2024-12-09 PROCEDURE — 93010 ELECTROCARDIOGRAM REPORT: CPT

## 2024-12-09 PROCEDURE — 71045 X-RAY EXAM CHEST 1 VIEW: CPT | Mod: 26

## 2024-12-09 PROCEDURE — 81025 URINE PREGNANCY TEST: CPT

## 2024-12-09 PROCEDURE — 85025 COMPLETE CBC W/AUTO DIFF WBC: CPT

## 2024-12-09 PROCEDURE — 80177 DRUG SCRN QUAN LEVETIRACETAM: CPT

## 2024-12-09 PROCEDURE — 72125 CT NECK SPINE W/O DYE: CPT | Mod: 26,MC

## 2024-12-09 PROCEDURE — 12002 RPR S/N/AX/GEN/TRNK2.6-7.5CM: CPT

## 2024-12-09 PROCEDURE — 70450 CT HEAD/BRAIN W/O DYE: CPT | Mod: MC

## 2024-12-09 PROCEDURE — 84484 ASSAY OF TROPONIN QUANT: CPT

## 2024-12-09 PROCEDURE — 84703 CHORIONIC GONADOTROPIN ASSAY: CPT

## 2024-12-09 PROCEDURE — 81001 URINALYSIS AUTO W/SCOPE: CPT

## 2024-12-09 PROCEDURE — 83735 ASSAY OF MAGNESIUM: CPT

## 2024-12-09 PROCEDURE — 80053 COMPREHEN METABOLIC PANEL: CPT

## 2024-12-09 PROCEDURE — 72125 CT NECK SPINE W/O DYE: CPT | Mod: MC

## 2024-12-09 PROCEDURE — 83605 ASSAY OF LACTIC ACID: CPT

## 2024-12-09 PROCEDURE — 99285 EMERGENCY DEPT VISIT HI MDM: CPT | Mod: 25

## 2024-12-09 PROCEDURE — 36415 COLL VENOUS BLD VENIPUNCTURE: CPT

## 2024-12-09 PROCEDURE — 70450 CT HEAD/BRAIN W/O DYE: CPT | Mod: 26,MC

## 2024-12-09 PROCEDURE — 36000 PLACE NEEDLE IN VEIN: CPT | Mod: XU

## 2024-12-09 RX ORDER — 0.9 % SODIUM CHLORIDE 0.9 %
1000 INTRAVENOUS SOLUTION INTRAVENOUS ONCE
Refills: 0 | Status: COMPLETED | OUTPATIENT
Start: 2024-12-09 | End: 2024-12-09

## 2024-12-09 RX ORDER — LEVETIRACETAM 1000 MG/1
1 TABLET ORAL
Qty: 30 | Refills: 0
Start: 2024-12-09 | End: 2025-01-07

## 2024-12-09 RX ORDER — ACETAMINOPHEN 500MG 500 MG/1
650 TABLET, COATED ORAL ONCE
Refills: 0 | Status: COMPLETED | OUTPATIENT
Start: 2024-12-09 | End: 2024-12-09

## 2024-12-09 RX ADMIN — ACETAMINOPHEN 500MG 650 MILLIGRAM(S): 500 TABLET, COATED ORAL at 11:25

## 2024-12-09 RX ADMIN — Medication 2000 MILLILITER(S): at 11:25

## 2024-12-09 NOTE — ED PROVIDER NOTE - PHYSICAL EXAMINATION
Gen: NAD, AOx3, non-toxic appearing  HEENT: +left superior scalp laceration ~2-3cm, not actively bleeding, surrounding dried blood. EOMI, PEERLA, normal conjunctiva, tongue midline, oral mucosa moist, +left tongue laceration ~1cm  Lung: CTAB, no respiratory distress, no wheezes/rhonchi/rales B/L, speaking in full sentences  CV: RRR, no murmurs, rubs or gallops  Abd: soft, NT, ND, no guarding, no rigidity, no rebound tenderness, no CVA tenderness   Neuro/MSK: CN II-XII intact, no focal sensory or motor deficits, no spinal/paraspinal tenderness   Extremities: no lower extremity edema bilaterally, cap refill <2s  Skin: Warm, well perfused, no rashes  Psych: normal affect Gen: NAD, AOx3, non-toxic appearing  HEENT: +left superior scalp laceration ~2-3cm, not actively bleeding, surrounding dried blood. EOMI, PEERLA, normal conjunctiva, tongue midline, oral mucosa moist, +right tongue laceration ~1cm no active bleeding  Lung: CTAB, no respiratory distress, no wheezes/rhonchi/rales B/L, speaking in full sentences  CV: RRR, no murmurs, rubs or gallops  Abd: soft, NT, ND, no guarding, no rigidity, no rebound tenderness, no CVA tenderness   Neuro/MSK: CN II-XII intact, no focal sensory or motor deficits, no spinal/paraspinal tenderness   Extremities: no lower extremity edema bilaterally, cap refill <2s  Skin: Warm, well perfused, no rashes  Psych: normal affect

## 2024-12-09 NOTE — CONSULT NOTE ADULT - ASSESSMENT
55yo F with PMHx seizure disorder (on Keppra Dr. Bell hyperthyroidism presenting after questionable seizure episode with a positive head trauma and a laceration over the forehead. She reports adhreance to medications and Recently saw Dr. Felder in office on Wednesday.     RECS:    - patient not agreeable to stay for further workup in the hospital today and wants to be back next week for further workup.  - OP workup with Dr Quintana recommended  - Increase the dose of AM Keppra to 1000mg and dose of 1500 mg   - Would be benefitted from an EEG OP 53yo F with PMHx seizure disorder (on Keppra Dr. Bell hyperthyroidism presenting after questionable seizure episode with a positive head trauma and a laceration over the forehead. She reports adhreance to medications and Recently saw Dr. Felder in office on Wednesday.     RECS:    - patient not agreeable to stay for further workup in the hospital today and wants to be back next week for further workup.  - OP workup with Dr Quintana recommended  - Increase the dose of AM Keppra to 1000mg and dose of 1500 mg   - Would be benefitted from an EEG OP  - obtain Keppra level and f/u OP

## 2024-12-09 NOTE — ED PROVIDER NOTE - NSFOLLOWUPINSTRUCTIONS_ED_ALL_ED_FT
You came to the ED after passing out and hitting your head. This may have happened due to a seizure or seizure-like event , or it may have been a cardiac (heart-related) event. We performed a CT scan of your head and neck which were unremarkable. You had a cut in your scalp that required 4 staples. We consulted the neurology team in case you had a seizure, they recommended increasing your morning Keppra dose to 1000mg and keeping your evening dose at 1500mg. Please follow up with Dr. Felder shortly after discharge. For your heart, we checked your heart enzymes and EKG which were normal. We recommend that you follow up with a cardiologist to see if you may require more monitoring or tests.     For your scalp laceration, please keep the area dry and clean. You may shower starting tomorrow, rinse gently with soap and water, please avoid vigorous washing or scrubbing. Please inspect the wound or have someone inspect the wound daily for signs of infection which include fevers, chills, pus, bleeding, redness around the wound. Please seek immediate medical attention if you notice these signs.     Our Emergency Department Referral Coordinators will be reaching out to you in the next 24-48 hours from 9:00am to 5:00pm with a follow up appointment. Please expect a phone call from the hospital in that time frame. If you do not receive a call or if you have any questions or concerns, you can reach them at   [phone]    SEEK IMMEDIATE ATTENTION OR CALL 181 if you experience the following symptoms: fevers, chills, chest pain, shortness of breath, palpitations, severe headache, confusion, weakness of your arms/legs, seizure event, dizziness/passing out, severe abdominal pain, nausea, vomiting. You came to the ED after passing out and hitting your head. This may have happened due to a seizure or seizure-like event , or it may have been a cardiac (heart-related) event. We performed a CT scan of your head and neck which were unremarkable. You had a cut in your scalp that required 4 staples. We consulted the neurology team in case you had a seizure, they recommended increasing your morning Keppra dose to 1000mg and keeping your evening dose at 1500mg. For your heart, we checked your heart enzymes and EKG which were normal. Please follow up with Dr. Felder shortly after discharge. We also recommend that you follow up with a cardiologist to see if you may require more monitoring or tests. Please do not drive or operate heavy machinery until cleared by your medical team. Continue all of your prescribed medications.      For your scalp laceration, please keep the area dry and clean. You may shower starting tomorrow, rinse gently with soap and water, please avoid vigorous washing or scrubbing. Please inspect the wound or have someone inspect the wound daily for signs of infection which include fevers, chills, pus, bleeding, redness around the wound. Please seek immediate medical attention if you notice these signs. Follow up in 2 weeks for staple removal.    Our Emergency Department Referral Coordinators will be reaching out to you in the next 24-48 hours from 9:00am to 5:00pm with a follow up appointment. Please expect a phone call from the hospital in that time frame. If you do not receive a call or if you have any questions or concerns, you can reach them at   [phone]    SEEK IMMEDIATE ATTENTION OR CALL 441 if you experience the following symptoms: fevers, chills, chest pain, shortness of breath, palpitations, severe headache, confusion, weakness of your arms/legs, seizure event, dizziness/passing out, severe abdominal pain, nausea, vomiting.

## 2024-12-09 NOTE — CONSULT NOTE ADULT - SUBJECTIVE AND OBJECTIVE BOX
NEUROLOGY CONSULT  Patient seen and examined at bedside. Patient has a bandage wrapped around the head s/p left forehead laceration repair - 4 staples placed. Patient described the event- she had a intense feeling of hot flash down from her pelvis and spreads to the head. She was aware that she was about to have seizure and they fell on the near by door and hit her head. As per the patient the event was unwitnessed and lost consciousness for about a minute that she said. She was about ot leave the school when this episode happened. She denies any post ictal confusion, weakness, urinary incontinence and altered sensations- woke right after the episode and walked right out of the school. She denies any preceding symptoms sensitivity of light and weird smell before the episode. CT head negative for any intracranial bleed or skull fracture.    Notably patient had a similar episode on the night of  where she lost consciousness and had mild urinary incontinence. When she spoke to Dr Quintana after thsi episode he increased her dose of keppra to 750 mg in the AM and 1500mg in the PM ( and ER formulation).    Off note patient also had her menopause and attributes her symptoms to menopause and hormonal changes    Patient followed up lastly with Dr Quintana OP on 26th Aug when she was gradually weaned off the dialtin and switched completely to keppra     Patient had the seizure disorder diagnosed in  where she was started on dilantin and was seizure free for almost 20 years when she had a GTC seizure in 2023.     HPI:  55yo F with PMHx seizure disorder (on Keppra Dr. Bell hyperthyroidism presenting after questionable seizure episode with a positive head trauma and a laceration over the forehead. She reports adhreance to medications and Recently saw Dr. Felder in office on Wednesday.     	Current Meds: Keppra 750mg AM/1500mg PM, methimazole 10mg qd, lexapro 10mg qd.     	On ROS, pt reports left-sided headache where she hit her head, denies vision or hearing changes.       MEDICATIONS  Home Medications:  cholecalciferol 1250 mcg (50,000 intl units) oral capsule: 1 cap(s) orally once a week (2023 02:14)  methIMAzole 5 mg oral tablet: 1 tab(s) orally every other day (2023 13:31)  omeprazole 20 mg oral delayed release tablet: 1 tab(s) orally 2 times a day (2023 02:14)    MEDICATIONS  (STANDING):    MEDICATIONS  (PRN):      FAMILY HISTORY:    SOCIAL HISTORY: negative for tobacco, alcohol, or ilicit drug use.    Allergies    No Known Allergies    Intolerances        Physical exam:  Constitutional: No acute distress, conversant  Eyes: Anicteric sclerae, moist conjunctivae, NO TONGUE LACERATIONS  Neck: trachea midline, FROM, supple, no thyromegaly or lymphadenopathy  Cardiovascular: Regular rate and rhythm, no murmurs, rubs, or gallops. No carotid bruits.   Pulmonary: Anterior breath sounds clear bilaterally, no crackles or wheezing. No use of accessory muscles  GI: Abdomen soft, non-distended, non-tender  Extremities: Radial and DP pulses +2, no edema    Neurologic:  -Mental status: Awake, alert, oriented to person, place, and time. Speech is fluent with intact naming, repetition, and comprehension, no dysarthria. Recent and remote memory intact. Follows commands. Attention/concentration intact. Fund of knowledge appropriate.  -Cranial nerves:   II: Visual fields are full to confrontation.  III, IV, VI: Extraocular movements are intact without nystagmus. Pupils equally round and reactive to light  V:  Facial sensation V1-V3 equal and intact   VII: Face is symmetric with normal eye closure and smile  VIII: Hearing is bilaterally intact to finger rub  IX, X: Uvula is midline and soft palate rises symmetrically  XI: Head turning and shoulder shrug are intact.  XII: Tongue protrudes midline  Motor: Normal bulk and tone. No pronator drift. Strength bilateral upper extremity 5/5, bilateral lower extremities 5/5.  Rapid alternating movements intact and symmetric. Diadiokinesia negative   Sensation: Intact to light touch bilaterally. No neglect or extinction on double simultaneous testing.  Coordination: No dysmetria on finger-to-nose and heel-to-shin bilaterally  Reflexes: Downgoing toes bilaterally   Gait: Normal gait and steady      LABS:                        14.2   10.70 )-----------( 265      ( 09 Dec 2024 11:36 )             42.7         135  |  100  |  18  ----------------------------<  150[H]  4.0   |  21  |  0.7    Ca    9.1      09 Dec 2024 11:36  Mg     2.2         TPro  6.6  /  Alb  4.3  /  TBili  0.3  /  DBili  x   /  AST  28  /  ALT  42[H]  /  AlkPhos  172[H]      Hemoglobin A1C:   Vitamin B12     CAPILLARY BLOOD GLUCOSE          Urinalysis Basic - ( 09 Dec 2024 11:36 )    Color: Yellow / Appearance: Clear / S.008 / pH: x  Gluc: 150 mg/dL / Ketone: Negative mg/dL  / Bili: Negative / Urobili: 0.2 mg/dL   Blood: x / Protein: Negative mg/dL / Nitrite: Negative   Leuk Esterase: Negative / RBC: 0 /HPF / WBC 0 /HPF   Sq Epi: x / Non Sq Epi: 1 /HPF / Bacteria: Negative /HPF            Microbiology:    Urinalysis with Rflx Culture (collected 09 Dec 2024 11:36)        RADIOLOGY, EKG AND ADDITIONAL TESTS: Reviewed.           NEUROLOGY CONSULT  Patient seen and examined at bedside. Patient has a bandage wrapped around the head s/p left forehead laceration repair - 4 staples placed. Patient described the event- she had a intense feeling of hot flash down from her pelvis and spreads to the head. She was aware that she was about to have seizure and they fell on the near by door and hit her head. As per the patient the event was unwitnessed and lost consciousness for about a minute that she said. She was about ot leave the school when this episode happened. She denies any post ictal confusion, weakness, urinary incontinence and altered sensations- woke right after the episode and walked right out of the school. She denies any preceding symptoms sensitivity of light and weird smell before the episode. CT head negative for any intracranial bleed or skull fracture.    Notably patient had a similar episode on the night of  where she lost consciousness and had mild urinary incontinence. When she spoke to Dr Quintana after thsi episode he increased her dose of keppra to 750 mg in the AM and 1500mg in the PM ( and ER formulation).    Off note patient also had her menopause and attributes her symptoms to menopause and hormonal changes    Patient followed up lastly with Dr Quintana OP on 26th Aug when she was gradually weaned off the dialtin and switched completely to keppra     Patient had the seizure disorder diagnosed in  where she was started on dilantin and was seizure free for almost 20 years when she had a GTC seizure in 2023.     HPI:  55yo F with PMHx seizure disorder (on Keppra Dr. Bell hyperthyroidism presenting after questionable seizure episode with a positive head trauma and a laceration over the forehead. She reports adhreance to medications and Recently saw Dr. Felder in office on Wednesday.     Current Meds: Keppra 750mg AM/1500mg PM, methimazole 10mg qd, lexapro 10mg qd.        MEDICATIONS  Home Medications:  cholecalciferol 1250 mcg (50,000 intl units) oral capsule: 1 cap(s) orally once a week (2023 02:14)  methIMAzole 5 mg oral tablet: 1 tab(s) orally every other day (2023 13:31)  omeprazole 20 mg oral delayed release tablet: 1 tab(s) orally 2 times a day (2023 02:14)    MEDICATIONS  (STANDING):    MEDICATIONS  (PRN):      FAMILY HISTORY:    SOCIAL HISTORY: negative for tobacco, alcohol, or ilicit drug use.    Allergies    No Known Allergies    Intolerances        Physical exam:  Constitutional: No acute distress, conversant  Eyes and oral : Anicteric sclerae, moist conjunctivae, NO TONGUE LACERATIONS  Extremities: Radial and DP pulses +2, no edema    Neurologic:  -Mental status: Awake, alert, oriented to person, place, and time. Speech is fluent with intact naming, repetition, and comprehension, no dysarthria. Recent and remote memory intact. Follows commands. Attention/concentration intact. Fund of knowledge appropriate.  -Cranial nerves:   II: Visual fields are full to confrontation.  III, IV, VI: Extraocular movements are intact without nystagmus. Pupils equally round and reactive to light  V:  Facial sensation V1-V3 equal and intact   VII: Face is symmetric with normal eye closure and smile  VIII: Hearing is bilaterally intact to finger rub  IX, X: Uvula is midline and soft palate rises symmetrically  XI: Head turning and shoulder shrug are intact.  XII: Tongue protrudes midline  Motor: Normal bulk and tone. No pronator drift. Strength bilateral upper extremity 5/5, bilateral lower extremities 5/5.  Rapid alternating movements intact and symmetric. Diadiokinesia negative   Sensation: Intact to light touch bilaterally. No neglect or extinction on double simultaneous testing.  Coordination: No dysmetria on finger-to-nose and heel-to-shin bilaterally  Reflexes: Downgoing toes bilaterally   Gait: Normal gait and steady      LABS:                        14.2   10.70 )-----------( 265      ( 09 Dec 2024 11:36 )             42.7         135  |  100  |  18  ----------------------------<  150[H]  4.0   |  21  |  0.7    Ca    9.1      09 Dec 2024 11:36  Mg     2.2         TPro  6.6  /  Alb  4.3  /  TBili  0.3  /  DBili  x   /  AST  28  /  ALT  42[H]  /  AlkPhos  172[H]  -    Hemoglobin A1C:   Vitamin B12     CAPILLARY BLOOD GLUCOSE          Urinalysis Basic - ( 09 Dec 2024 11:36 )    Color: Yellow / Appearance: Clear / S.008 / pH: x  Gluc: 150 mg/dL / Ketone: Negative mg/dL  / Bili: Negative / Urobili: 0.2 mg/dL   Blood: x / Protein: Negative mg/dL / Nitrite: Negative   Leuk Esterase: Negative / RBC: 0 /HPF / WBC 0 /HPF   Sq Epi: x / Non Sq Epi: 1 /HPF / Bacteria: Negative /HPF            Microbiology:    Urinalysis with Rflx Culture (collected 09 Dec 2024 11:36)        RADIOLOGY, EKG AND ADDITIONAL TESTS: Reviewed.

## 2024-12-09 NOTE — ED ADULT NURSE NOTE - CHIEF COMPLAINT QUOTE
Simple: Patient demonstrates quick and easy understanding
pt had seizure today at work. pt switched from dilatin to Keppra in august and has had 3 episodes of seizures since switching. pt + head injury with lac. no blood thinners. fs 102

## 2024-12-09 NOTE — ED PROVIDER NOTE - PATIENT PORTAL LINK FT
You can access the FollowMyHealth Patient Portal offered by [facility] by registering at the following website: [email or web address]. By joining [email or web address]’s FollowMyHealth portal, you will also be able to view your health information using other applications (apps) compatible with our system.

## 2024-12-09 NOTE — ED PROVIDER NOTE - EKG/XRAY ADDITIONAL INFORMATION
ED EKG: my independent interpretation - Dr. Meseret Berger : [64 NSR, no STEMI]   ED CXR prelim, my independent interpretation - Dr. Meseret Berger: [No PTX, No infiltrates, No Free air]

## 2024-12-09 NOTE — ED PROVIDER NOTE - CONSIDERATION OF ADMISSION OBSERVATION
consider observation unit for syncope but pt adamant /asking multiple times to go home, does not want to stay for further work up and recent echo was wnl, cleared by neuro, ekg/trop neg x 2 Consideration of Admission/Observation

## 2024-12-09 NOTE — ED ADULT TRIAGE NOTE - INTERNATIONAL TRAVEL
No
All results discussed with patient.  CT abdomen pelvis with diverticulitis.  Will send Cipro and Flagyl to pharmacy and give gastro follow-up.  Return precautions discussed.

## 2024-12-09 NOTE — ED PROVIDER NOTE - BIRTH SEX
Adams Puckett called requesting a refill of the below medication which has been pended for you:     Requested Prescriptions     Pending Prescriptions Disp Refills    cholestyramine (QUESTRAN) 4 GM/DOSE powder [Pharmacy Med Name: [facility]r 378 g 3     Sig: [id number] AND TAKE BY MOUTH DAILY       Last Appointment Date: 12/6/2022  Next Appointment Date: 6/8/2023    Allergies   Allergen Reactions    Lipitor      weakness    Lovastatin      weakness    Statins      weakness
Female

## 2024-12-09 NOTE — ED ADULT TRIAGE NOTE - CHIEF COMPLAINT QUOTE
pt had seizure today at work. pt switched from dilatin to Keppra in august and has had 3 episodes of seizures since switching. pt + head injury with lac. no blood thinners. fs 102

## 2024-12-09 NOTE — ED PROVIDER NOTE - NS ED ROS FT
CONSTITUTIONAL: No fevers, no chills, no dizziness  HEENT: no blurry vision, no change in hearing, no nasal congestion, no sore throat  CV: No chest pain, no palpitations  RESP: No SOB, no cough  GI: No abdominal pain, no nausea, no vomiting, no diarrhea, no constipation  : No dysuria, no hematuria, no flank pain  MSK: no swelling of extremities, no back pain, no extremity pain  SKIN: No rashes  NEURO: +headache, no focal weakness, no decreased sensation/paresthesias  PSYCH: denies SI/HI, anxiety, depressed mood

## 2024-12-09 NOTE — ED ADULT NURSE NOTE - NS ED NOTE ABUSE RESPONSE YN
Quality 130: Documentation Of Current Medications In The Medical Record: Current Medications Documented Quality 431: Preventive Care And Screening: Unhealthy Alcohol Use - Screening: Patient screened for unhealthy alcohol use using a single question and scores less than 2 times per year Yes Quality 226: Preventive Care And Screening: Tobacco Use: Screening And Cessation Intervention: Patient screened for tobacco use and is an ex/non-smoker Detail Level: Detailed

## 2024-12-09 NOTE — ED ADULT NURSE NOTE - NSFALLUNIVINTERV_ED_ALL_ED
Bed/Stretcher in lowest position, wheels locked, appropriate side rails in place/Call bell, personal items and telephone in reach/Instruct patient to call for assistance before getting out of bed/chair/stretcher/Non-slip footwear applied when patient is off stretcher/[place] to call system/Physically safe environment - no spills, clutter or unnecessary equipment/Purposeful proactive rounding/Room/bathroom lighting operational, light cord in reach

## 2024-12-09 NOTE — ED PROVIDER NOTE - PROGRESS NOTE DETAILS
Neurology consulted via TEAMS Spoke with neurology resident Dr. Larsen. Neuro recommending increase keppra dose to 1g in AM, draw keppra level to be followed by neuro. Follow up with Dr. Felder

## 2024-12-09 NOTE — ED PROVIDER NOTE - CLINICAL SUMMARY MEDICAL DECISION MAKING FREE TEXT BOX
54F PMH epilepsy on keppra 750mg am/1500 mg pm, follows with Dr Parrish, hyperthyroid on methamzole, p/w loc/possible seizure. pt states she was at work , walking and fell to ground, hit head and sustained scalp laceration. bit her tongue but no shaking or incontinence. no cp, sob, palp, dizziness. no ha, neck pain, bp, numbness weakness blurry vision slurred speech. no fever cough. no abd pain nvdc. no urinary sx. last seizure was early nov also needed scalp staples for laceration. reports compliance with sz meds. seen by cards Dr Vickers, had echo in june unremarkable. pt states she feels better and wants to go home now.     on exam, AFVSS, well kathia nad, nc, 4 cm Left frontoparietal scalp laceration,  eomi, perrla, mmm, lctab, rrr nl s1s2 no mrg, abd soft ntnd aaox3, CN 2-12 intact, No nystagmus.  5/5 motor x 4 ext, SILT x 4 extremities, No facial droop or slurred speech. No pronator drift.  Normal rapid alternating movement and finger nose finger bilaterally. No midline C/T/L tenderness to palpation or step off. Normal gait, No ataxia.  , no le edema or calf ttp,     a/p; Seizure vs syncope, ekg nsr, trop neg x 2, cxr clear, cth /c spine neg, nv intact, no murmurs, pt seen and cleared by neuro- recommends inc AM silvestre and f/u dr parrish as outpt 1-2 weeks, also recommended to patient to see Dr Vickers as outpt 1 week to get holter monitor given that pt is refusing to stay for EEG/neuro work up or tele/echo overnight (obs pathway) , strict return precautions provided.

## 2024-12-09 NOTE — ED PROVIDER NOTE - OBJECTIVE STATEMENT
53yo F with PMHx seizure disorder (on keppra, Dr. Felder), hyperthyroidism presenting after syncopal vs. seizure episode. 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 +HT on serrated photo frame, not on blood thinners. +tongue laceration. Denies urinary incontinence. Does not recall losing consciousness, denies fevers, chills, chest pain, SOB, palpitations, dizziness, abdominal pain, n/v/d/c, dysuria, back pain, neck pain. Patient believes it was not a seizure event. Pt reports typical seizure activity varies, sometimes tonic-clonic, sometimes focal w/ awareness and shaking. Reports she does not think she was shaking during this event however does not recall and is only accompanied by daughter who did not witness event. Reports strict adherence to medications.  Recently saw Dr. Felder in office on Wednesday. States this last happened on Halloween, after which they increased her keppra and methimazole.    Current Meds: Keppra 750mg AM/1500mg PM, methimazole 10mg qd, lexapro 10mg qd.     On ROS, pt reports left-sided headache where she hit her head, denies vision or hearing changes. 55yo F with PMHx seizure disorder (on keppra, Dr. Felder), hyperthyroidism presenting after syncopal vs. seizure episode. 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 +HT on serrated photo frame, not on blood thinners. +tongue laceration. Denies urinary incontinence. Does not recall losing consciousness, denies fevers, chills, chest pain, SOB, palpitations, dizziness, abdominal pain, n/v/d/c, dysuria, back pain, neck pain. Patient believes it was not a seizure event. Pt reports typical seizure activity varies, sometimes tonic-clonic, sometimes focal w/ awareness and shaking. Reports aura can sometimes be the "hot flash" sensation. Reports she does not think she was shaking during this event however does not recall and is only accompanied by daughter who did not witness event. Reports strict adherence to medications.  Recently saw Dr. Felder in office on Wednesday. States this last happened on Halloween, after which they increased her keppra and methimazole.    Current Meds: Keppra 750mg AM/1500mg PM, methimazole 10mg qd, lexapro 10mg qd.     On ROS, pt reports left-sided headache where she hit her head, denies vision or hearing changes.

## 2024-12-10 PROBLEM — F39 MOOD DISORDER: Status: ACTIVE | Noted: 2024-12-10

## 2024-12-12 LAB — LEVETIRACETAM SERPL-MCNC: 15.4 UG/ML — SIGNIFICANT CHANGE UP (ref 10–40)

## 2024-12-17 ENCOUNTER — APPOINTMENT (OUTPATIENT)
Age: 54
End: 2024-12-17
Payer: COMMERCIAL

## 2024-12-17 ENCOUNTER — INPATIENT (INPATIENT)
Facility: HOSPITAL | Age: 54
LOS: 2 days | Discharge: HOME | DRG: 101 | End: 2024-12-20
Attending: HOSPITALIST | Admitting: PSYCHIATRY & NEUROLOGY
Payer: COMMERCIAL

## 2024-12-17 VITALS
SYSTOLIC BLOOD PRESSURE: 111 MMHG | RESPIRATION RATE: 18 BRPM | HEIGHT: 64 IN | WEIGHT: 220.46 LBS | TEMPERATURE: 98 F | OXYGEN SATURATION: 96 % | HEART RATE: 67 BPM | DIASTOLIC BLOOD PRESSURE: 76 MMHG

## 2024-12-17 DIAGNOSIS — Z90.79 ACQUIRED ABSENCE OF OTHER GENITAL ORGAN(S): Chronic | ICD-10-CM

## 2024-12-17 DIAGNOSIS — Z90.721 ACQUIRED ABSENCE OF OVARIES, UNILATERAL: Chronic | ICD-10-CM

## 2024-12-17 DIAGNOSIS — G40.89 OTHER SEIZURES: ICD-10-CM

## 2024-12-17 LAB
ALBUMIN SERPL ELPH-MCNC: 3.9 G/DL — SIGNIFICANT CHANGE UP (ref 3.5–5.2)
ALP SERPL-CCNC: 160 U/L — HIGH (ref 30–115)
ALT FLD-CCNC: 31 U/L — SIGNIFICANT CHANGE UP (ref 0–41)
ANION GAP SERPL CALC-SCNC: 9 MMOL/L — SIGNIFICANT CHANGE UP (ref 7–14)
AST SERPL-CCNC: 23 U/L — SIGNIFICANT CHANGE UP (ref 0–41)
BASOPHILS # BLD AUTO: 0.09 K/UL — SIGNIFICANT CHANGE UP (ref 0–0.2)
BASOPHILS NFR BLD AUTO: 1 % — SIGNIFICANT CHANGE UP (ref 0–1)
BILIRUB SERPL-MCNC: <0.2 MG/DL — SIGNIFICANT CHANGE UP (ref 0.2–1.2)
BUN SERPL-MCNC: 22 MG/DL — HIGH (ref 10–20)
CALCIUM SERPL-MCNC: 8.8 MG/DL — SIGNIFICANT CHANGE UP (ref 8.4–10.5)
CHLORIDE SERPL-SCNC: 104 MMOL/L — SIGNIFICANT CHANGE UP (ref 98–110)
CO2 SERPL-SCNC: 26 MMOL/L — SIGNIFICANT CHANGE UP (ref 17–32)
CREAT SERPL-MCNC: 0.9 MG/DL — SIGNIFICANT CHANGE UP (ref 0.7–1.5)
EGFR: 76 ML/MIN/1.73M2 — SIGNIFICANT CHANGE UP
EOSINOPHIL # BLD AUTO: 0.22 K/UL — SIGNIFICANT CHANGE UP (ref 0–0.7)
EOSINOPHIL NFR BLD AUTO: 2.4 % — SIGNIFICANT CHANGE UP (ref 0–8)
GLUCOSE SERPL-MCNC: 86 MG/DL — SIGNIFICANT CHANGE UP (ref 70–99)
HCT VFR BLD CALC: 40.3 % — SIGNIFICANT CHANGE UP (ref 37–47)
HGB BLD-MCNC: 13.5 G/DL — SIGNIFICANT CHANGE UP (ref 12–16)
IMM GRANULOCYTES NFR BLD AUTO: 0.3 % — SIGNIFICANT CHANGE UP (ref 0.1–0.3)
LYMPHOCYTES # BLD AUTO: 3.65 K/UL — HIGH (ref 1.2–3.4)
LYMPHOCYTES # BLD AUTO: 39.8 % — SIGNIFICANT CHANGE UP (ref 20.5–51.1)
MAGNESIUM SERPL-MCNC: 2.4 MG/DL — SIGNIFICANT CHANGE UP (ref 1.8–2.4)
MCHC RBC-ENTMCNC: 30.2 PG — SIGNIFICANT CHANGE UP (ref 27–31)
MCHC RBC-ENTMCNC: 33.5 G/DL — SIGNIFICANT CHANGE UP (ref 32–37)
MCV RBC AUTO: 90.2 FL — SIGNIFICANT CHANGE UP (ref 81–99)
MONOCYTES # BLD AUTO: 0.78 K/UL — HIGH (ref 0.1–0.6)
MONOCYTES NFR BLD AUTO: 8.5 % — SIGNIFICANT CHANGE UP (ref 1.7–9.3)
NEUTROPHILS # BLD AUTO: 4.39 K/UL — SIGNIFICANT CHANGE UP (ref 1.4–6.5)
NEUTROPHILS NFR BLD AUTO: 48 % — SIGNIFICANT CHANGE UP (ref 42.2–75.2)
NRBC # BLD: 0 /100 WBCS — SIGNIFICANT CHANGE UP (ref 0–0)
PLATELET # BLD AUTO: 241 K/UL — SIGNIFICANT CHANGE UP (ref 130–400)
PMV BLD: 10.5 FL — HIGH (ref 7.4–10.4)
POTASSIUM SERPL-MCNC: 4.3 MMOL/L — SIGNIFICANT CHANGE UP (ref 3.5–5)
POTASSIUM SERPL-SCNC: 4.3 MMOL/L — SIGNIFICANT CHANGE UP (ref 3.5–5)
PROT SERPL-MCNC: 6.2 G/DL — SIGNIFICANT CHANGE UP (ref 6–8)
RBC # BLD: 4.47 M/UL — SIGNIFICANT CHANGE UP (ref 4.2–5.4)
RBC # FLD: 12.8 % — SIGNIFICANT CHANGE UP (ref 11.5–14.5)
SODIUM SERPL-SCNC: 139 MMOL/L — SIGNIFICANT CHANGE UP (ref 135–146)
WBC # BLD: 9.16 K/UL — SIGNIFICANT CHANGE UP (ref 4.8–10.8)
WBC # FLD AUTO: 9.16 K/UL — SIGNIFICANT CHANGE UP (ref 4.8–10.8)

## 2024-12-17 PROCEDURE — 83735 ASSAY OF MAGNESIUM: CPT

## 2024-12-17 PROCEDURE — G0378: CPT

## 2024-12-17 PROCEDURE — 36415 COLL VENOUS BLD VENIPUNCTURE: CPT

## 2024-12-17 PROCEDURE — 80053 COMPREHEN METABOLIC PANEL: CPT

## 2024-12-17 PROCEDURE — 84443 ASSAY THYROID STIM HORMONE: CPT

## 2024-12-17 PROCEDURE — 99221 1ST HOSP IP/OBS SF/LOW 40: CPT

## 2024-12-17 PROCEDURE — 95700 EEG CONT REC W/VID EEG TECH: CPT

## 2024-12-17 PROCEDURE — 99222 1ST HOSP IP/OBS MODERATE 55: CPT

## 2024-12-17 PROCEDURE — 93005 ELECTROCARDIOGRAM TRACING: CPT

## 2024-12-17 PROCEDURE — 95716 VEEG EA 12-26HR CONT MNTR: CPT

## 2024-12-17 PROCEDURE — 84436 ASSAY OF TOTAL THYROXINE: CPT

## 2024-12-17 PROCEDURE — 84480 ASSAY TRIIODOTHYRONINE (T3): CPT

## 2024-12-17 PROCEDURE — 85025 COMPLETE CBC W/AUTO DIFF WBC: CPT

## 2024-12-17 PROCEDURE — 80177 DRUG SCRN QUAN LEVETIRACETAM: CPT

## 2024-12-17 RX ORDER — NICOTINE 21 MG/24H
1 PATCH, EXTENDED RELEASE TRANSDERMAL DAILY
Refills: 0 | Status: DISCONTINUED | OUTPATIENT
Start: 2024-12-17 | End: 2024-12-20

## 2024-12-17 RX ORDER — MAGNESIUM, ALUMINUM HYDROXIDE 200-225/5
30 SUSPENSION, ORAL (FINAL DOSE FORM) ORAL EVERY 4 HOURS
Refills: 0 | Status: DISCONTINUED | OUTPATIENT
Start: 2024-12-17 | End: 2024-12-20

## 2024-12-17 RX ORDER — LEVETIRACETAM 1000 MG/1
750 TABLET ORAL
Refills: 0 | Status: DISCONTINUED | OUTPATIENT
Start: 2024-12-17 | End: 2024-12-17

## 2024-12-17 RX ORDER — ESCITALOPRAM OXALATE 10 MG/1
10 TABLET, FILM COATED ORAL DAILY
Refills: 0 | Status: DISCONTINUED | OUTPATIENT
Start: 2024-12-17 | End: 2024-12-20

## 2024-12-17 RX ORDER — LEVETIRACETAM 1000 MG/1
750 TABLET ORAL
Refills: 0 | Status: DISCONTINUED | OUTPATIENT
Start: 2024-12-17 | End: 2024-12-18

## 2024-12-17 RX ORDER — ACETAMINOPHEN 500MG 500 MG/1
650 TABLET, COATED ORAL EVERY 6 HOURS
Refills: 0 | Status: DISCONTINUED | OUTPATIENT
Start: 2024-12-17 | End: 2024-12-20

## 2024-12-17 RX ORDER — ACETAMINOPHEN, DIPHENHYDRAMINE HCL, PHENYLEPHRINE HCL 325; 25; 5 MG/1; MG/1; MG/1
3 TABLET ORAL AT BEDTIME
Refills: 0 | Status: DISCONTINUED | OUTPATIENT
Start: 2024-12-17 | End: 2024-12-20

## 2024-12-17 RX ORDER — ESCITALOPRAM OXALATE 10 MG/1
1 TABLET, FILM COATED ORAL
Refills: 0 | DISCHARGE

## 2024-12-17 RX ORDER — LORAZEPAM 2 MG/1
2 TABLET ORAL THREE TIMES A DAY
Refills: 0 | Status: DISCONTINUED | OUTPATIENT
Start: 2024-12-17 | End: 2024-12-20

## 2024-12-17 RX ORDER — LEVETIRACETAM 1000 MG/1
1500 TABLET ORAL
Refills: 0 | Status: DISCONTINUED | OUTPATIENT
Start: 2024-12-17 | End: 2024-12-18

## 2024-12-17 RX ORDER — ONDANSETRON HYDROCHLORIDE 4 MG/1
4 TABLET, FILM COATED ORAL EVERY 8 HOURS
Refills: 0 | Status: DISCONTINUED | OUTPATIENT
Start: 2024-12-17 | End: 2024-12-20

## 2024-12-17 RX ADMIN — LEVETIRACETAM 1500 MILLIGRAM(S): 1000 TABLET ORAL at 21:08

## 2024-12-17 NOTE — CONSULT NOTE ADULT - NS ATTEND AMEND GEN_ALL_CORE FT
Agree with history and physical exam.  Patient is here for further characterization of recent events as described above. Some aspects of the events are suggestive of syncopal episodes. Considering recently diagnosed hypothyroidism, the possibility of paroxysmal a-fib exists.  Will start VEEG monitoring.  Seizure precautions.  Continue home dose of Keppra for now, send trough level.

## 2024-12-17 NOTE — H&P ADULT - NSHPPHYSICALEXAM_GEN_ALL_CORE
T(C): --  HR: --  BP: --  RR: --  SpO2: --    PHYSICAL EXAM:  GENERAL: laying in bed, appearing comfortable and in NAD  HEENT: Moist mucous membranes  NECK: Supple  CHEST/LUNG: even respirations b/l; no accessory muscle use  ABDOMEN: Soft, Nontender, Nondistended  EXTREMITIES:   No calf TTP b/l  SKIN: warm  Neuro: A&Ox4 PHYSICAL EXAM:  GENERAL: laying in bed, appearing comfortable and in NAD  HEENT: Moist mucous membranes  NECK: Supple  CHEST/LUNG: even respirations b/l; no accessory muscle use  ABDOMEN: Soft, Nontender, Nondistended  EXTREMITIES:   No calf TTP b/l  Neuro: A&Ox4    vitals pending PHYSICAL EXAM:  GENERAL: laying in bed, appearing comfortable and in NAD  HEENT: Moist mucous membranes  NECK: Supple  CHEST/LUNG: even respirations b/l; no accessory muscle use  ABDOMEN: Soft  EXTREMITIES:   No calf TTP b/l  Neuro: A&Ox4    vitals pending

## 2024-12-17 NOTE — H&P ADULT - HISTORY OF PRESENT ILLNESS
53yo F with PMHx seizure disorder (on silvestre, Dr. Felder), hyperthyroidism hwere for Veeg admission as rec'ed by her outpt neurologist 53yo F current smoker with PMHx seizure disorder (on silvestre, Dr. Felder), hyperthyroidism, anxiety here for Veeg admission as rec'ed by her outpt neurologist. Denies fever, chills, recent illnesses, V/N/D, abdominal pain. Patient reports last seizure reported December 9th.

## 2024-12-17 NOTE — H&P ADULT - ASSESSMENT
53yo F with PMHx seizure disorder (on keppra, Dr. Felder), hyperthyroidism hwere for Veeg admission as rec'ed by her outpt neurologist      # seizure disorder  - admit to EMU  - neurology EMU consult  - Video EEG for characterization and treatment plan  - seizure precautions  - Ativan 2mg IV PRN for generalized tonic-clonic seizure lasting longer than 2 minutes, or two consecutive seizures without return to baseline in-between   - f/u mag; keep > 2.0  - continuation of home AEDs to be determined by EMU team 55yo F current smoker with PMHx seizure disorder (on keamilcarra, Dr. Felder), hyperthyroidism, anxiety, here for Veeg admission as rec'ed by her outpt neurologist. Denies fever, chills, recent illnesses, V/N/D, abdominal pain. Patient reports last seizure reported December 9th.     # seizure disorder  - admit to EMU  - neurology EMU consult  - Video EEG for characterization and treatment plan  - seizure precautions  - Ativan 2mg IV PRN for generalized tonic-clonic seizure lasting longer than 2 minutes, or two consecutive seizures without return to baseline in-between   - f/u mag; keep > 2.0  - continuation of home AEDs to be determined by EMU team    #hyperthyroidism  continue home meds     #HX of anxiety  continue home meds     # Current smoker  Nicotine patch ordered  55yo F current smoker with PMHx seizure disorder (on keppra, Dr. Felder), hyperthyroidism, anxiety, here for Veeg admission as rec'ed by her outpt neurologist. Denies fever, chills, recent illnesses, V/N/D, abdominal pain. Patient reports last seizure reported December 9th.     # seizure disorder  - admit to EMU  - neurology EMU consult  - Video EEG for characterization and treatment plan  - seizure precautions  - Ativan 2mg IV PRN for generalized tonic-clonic seizure lasting longer than 2 minutes, or two consecutive seizures without return to baseline in-between   - f/u mag; keep > 2.0  - continuation of home AEDs to be determined by EMU team    #hyperthyroidism  continue methimazole      # Current smoker  Nicotine patch ordered         pt d/w Dr. Suarez

## 2024-12-17 NOTE — PATIENT PROFILE ADULT - FALL HARM RISK - HARM RISK INTERVENTIONS

## 2024-12-17 NOTE — H&P ADULT - NS ATTEND AMEND GEN_ALL_CORE FT
pt seen and examined on 4i  #epilepsy - on keppra at home - dose recently increased in october  here for elective veeg  breakthrough seizures  seizure precautions  check keppra level, cbc, cmp, mg    #hyperthyroid - on methimazole    #smoking cessation , does not want nicotine patch

## 2024-12-17 NOTE — H&P ADULT - NSHPSOCIALHISTORY_GEN_ALL_CORE
current smoker 1/2 pack per day  denies alcohol and illicit drug use pt reports she currently smokes ( less than 1 pack per day)   denies alcohol and illicit drug use

## 2024-12-17 NOTE — CONSULT NOTE ADULT - SUBJECTIVE AND OBJECTIVE BOX
Neurology/Epilepsy Consult:    TED ROSE 54y Female  MRN-[id number]    Patient is a 54y old right-handed Female who presents for elective VEEG        HPI: History obtained from patient. Outpatient records reviewed.  53yo F current smoker with PMHx seizure disorder (on keppra, Dr. Felder), hyperthyroidism, anxiety here for Veeg admission as rec'ed by her outpt neurologist. Denies fever, chills, recent illnesses, V/N/D, abdominal pain. Patient reports last seizure reported .  (17 Dec 2024 10:50)        PAST MEDICAL & SURGICAL HISTORY:  Epilepsy  Cervical herniated disc  Thyroid nodules  Hyperthyroidism  Ovarian cysts  H/o bilateral salpingectomy  H/o R oophorectomy        FAMILY HISTORY:  Aunt - seizures        Social History:  Lives alone, has 2 children  Work FT as [employer]   Smoking - 10-10 sig/day (refused nicotine patch)  Denies ETOH/recreational drug use        Risk factors:  Birth history is not available (adopted at 5 mo)  Normal growth and development  No febrile seizures/CNS infections  No head trauma with loss of consciousness          Allergy:  No Known Allergies          Home Medications:  LEVETIRACETAM  MG TABLET: 1 tab orally once a day in the morning & 2 tabs once a day (at bedtime)  Lexapro 10 mg oral tablet: 1 tab(s) orally once a day   methIMAzole 5 mg oral tablet: 2 tab(s) orally once a day (17 Dec 2024 11:28)  Vitamin B6 100mg daily in the morning        MEDICATIONS  (STANDING):  escitalopram 10 milliGRAM(s) Oral daily  levETIRAcetam  milliGRAM(s) Oral <User Schedule>  levETIRAcetam ER 1500 milliGRAM(s) Oral <User Schedule>  methimazole 10 milliGRAM(s) Oral daily  nicotine -  14 mG/24Hr(s) Patch 1 Patch Transdermal daily    MEDICATIONS  (PRN):  acetaminophen     Tablet .. 650 milliGRAM(s) Oral every 6 hours PRN Temp greater or equal to 38C (100.4F), Mild Pain (1 - 3)  aluminum hydroxide/magnesium hydroxide/simethicone Suspension 30 milliLiter(s) Oral every 4 hours PRN Dyspepsia  LORazepam   Injectable 2 milliGRAM(s) IV Push three times a day PRN generalized tonic-clonic seizure lasting longer than 2 minutes, or two consecutive seizures without return to baseline in-between  melatonin 3 milliGRAM(s) Oral at bedtime PRN Insomnia  ondansetron Injectable 4 milliGRAM(s) IV Push every 8 hours PRN Nausea and/or Vomiting            T(F): 98 (24 @ 12:32), Max: 98 (1724 @ 12:32)  HR: 67 (24 @ 12:32) (67 - 67)  BP: 111/76 (-17-24 @ 12:32) (111/76 - 111/76)  RR: 18 (24 @ 12:32) (18 - 18)  SpO2: 96% (24 @ 12:32) (96% - 96%)          Neurologic Examination:  General:  Appearance is consistent with chronologic age.  No abnormal facies.   General: The patient is oriented to person, place, time and date.  Recent and remote memory intact.  Follows 4-step directions. Fund of knowledge is intact and normal.  Language with normal repetition, comprehension and naming.  Nondysarthric.    Cranial nerves: EOMI w/o nystagmus, skew or reported double vision.  PERRL.  No ptosis/weakness of eyelid closure.  Facial sensation is normal with normal bite.  No facial asymmetry.  Hearing grossly intact b/l.  Palate elevates midline.  Tongue midline.  Motor examination:   Normal tone, bulk and range of motion.  No tenderness, twitching, tremors or involuntary movements.  Formal Muscle Strength Testin/5 UE; 5/5 LE.  No observable drift.  Reflexes:   2+ b/l   Sensory examination:   Intact to light touch and pinprick, pain.  Cerebellum:   FTN/HKS intact with normal MARY in all limbs.  No dysmetria or dysdiadokinesia.  Gait narrow based and normal.        Labs:   Levetiracetam Level, Serum: 15.4 ug/mL (24 @ 16:30)             Neuroimaging:  CT Head:   < from: CT Head No Cont (24 @ 10:53) >  IMPRESSION:    CT HEAD:  No acute intracranial pathology. No evidence ofmidline shift, mass   effect or intracranial hemorrhage.    < end of copied text >        MRI Brain:             REEG 2024 - normal  REEG 2024 - left midtemporal sharp waves that are not clearly epiletiform  REEG 2024 - normal    VEEG  - 2023:  Background Slowing:  -Intermittent diffuse theta and polymorphic delta slowing.    Focal Slowing:   -Intermittent polymorphic delta slowing in the left temporal region    Other Non-Epileptiform Findings:  -Diffuse excess beta activity.    Interictal Epileptiform Activity:   -Rare sharp wave in the left anterior temporal region, maximum F7/T7    Events:  Clinical events: None recorded.  Seizures: None recorded.    REEG 10/2/2020 - normal  AEEG  - 2018 - normal          Assessment:  This is a 54y Female with h/o         Discussed with Dr. Felder        Plan:   - VEEG monitoring for better characterization and assessment  - Seizure precautions  - Ativan 2mg IV PRN for generalized tonic-clonic seizure lasting longer than 2 minutes, or two consecutive seizures without return to baseline in-between (ordered)  - CBC, CMP, Mg, CK, ASM levels trough (ordered)  - Keep Mg above 2    Plan discussed with patient in details, all questions answered.    Discussed with nursing team, hospitalist, and PA. Neurology/Epilepsy Consult:    TED ROSE 54y Female  MRN-[id number]    Patient is a 54y old right-handed Female who presents for elective VEEG        HPI: History obtained from patient. Outpatient records reviewed.  Patient was diagnosed with epilepsy in  when she had first witnessed seizure described as GTC event.         PAST MEDICAL & SURGICAL HISTORY:  Epilepsy  Cervical herniated disc  Thyroid nodules  Hyperthyroidism  Ovarian cysts  H/o bilateral salpingectomy  H/o R oophorectomy        FAMILY HISTORY:  Aunt - seizures        Social History:  Lives alone, has 2 children  Work FT as [employer]   Smoking - 10-10 sig/day (refused nicotine patch)  Denies ETOH/recreational drug use        Risk factors:  Birth history is not available (adopted at 5 mo)  Normal growth and development  No febrile seizures/CNS infections  No head trauma with loss of consciousness          Allergy:  No Known Allergies          Home Medications:  LEVETIRACETAM  MG TABLET: 1 tab orally once a day in the morning & 2 tabs once a day (at bedtime)  Lexapro 10 mg oral tablet: 1 tab(s) orally once a day   methIMAzole 5 mg oral tablet: 2 tab(s) orally once a day (17 Dec 2024 11:28)  Vitamin B6 100mg daily in the morning        MEDICATIONS  (STANDING):  escitalopram 10 milliGRAM(s) Oral daily  levETIRAcetam  milliGRAM(s) Oral <User Schedule>  levETIRAcetam ER 1500 milliGRAM(s) Oral <User Schedule>  methimazole 10 milliGRAM(s) Oral daily  nicotine -  14 mG/24Hr(s) Patch 1 Patch Transdermal daily    MEDICATIONS  (PRN):  acetaminophen     Tablet .. 650 milliGRAM(s) Oral every 6 hours PRN Temp greater or equal to 38C (100.4F), Mild Pain (1 - 3)  aluminum hydroxide/magnesium hydroxide/simethicone Suspension 30 milliLiter(s) Oral every 4 hours PRN Dyspepsia  LORazepam   Injectable 2 milliGRAM(s) IV Push three times a day PRN generalized tonic-clonic seizure lasting longer than 2 minutes, or two consecutive seizures without return to baseline in-between  melatonin 3 milliGRAM(s) Oral at bedtime PRN Insomnia  ondansetron Injectable 4 milliGRAM(s) IV Push every 8 hours PRN Nausea and/or Vomiting            T(F): 98 (24 @ 12:32), Max: 98 (24 @ 12:32)  HR: 67 (24 @ 12:32) (67 - 67)  BP: 111/76 (24 @ 12:32) (111/76 - 111/76)  RR: 18 (24 @ 12:32) (18 - 18)  SpO2: 96% (24 @ 12:32) (96% - 96%)          Neurologic Examination:  General:  Appearance is consistent with chronologic age.  No abnormal facies.   General: The patient is oriented to person, place, time and date.  Recent and remote memory intact.  Follows 4-step directions. Fund of knowledge is intact and normal.  Language with normal repetition, comprehension and naming.  Nondysarthric.    Cranial nerves: EOMI w/o nystagmus, skew or reported double vision.  PERRL.  No ptosis/weakness of eyelid closure.  Facial sensation is normal with normal bite.  No facial asymmetry.  Hearing grossly intact b/l.  Palate elevates midline.  Tongue midline.  Motor examination:   Normal tone, bulk and range of motion.  No tenderness, twitching, tremors or involuntary movements.  Formal Muscle Strength Testin/5 UE; 5/5 LE.  No observable drift.  Reflexes:   2+ b/l   Sensory examination:   Intact to light touch and pinprick, pain.  Cerebellum:   FTN/HKS intact with normal MARY in all limbs.  No dysmetria or dysdiadokinesia.  Gait narrow based and normal.        Labs:   Levetiracetam Level, Serum: 15.4 ug/mL (24 @ 16:30)             Neuroimaging:  CT Head:   < from: CT Head No Cont (24 @ 10:53) >  IMPRESSION:    CT HEAD:  No acute intracranial pathology. No evidence ofmidline shift, mass   effect or intracranial hemorrhage.    < end of copied text >        MRI Brain with/without contrast 2024 at [facility] - motion limited, normal bilateral hippocampal formations, minimal chronic nonspecific WM changes.            REEG 2024 - normal  REEG 2024 - left midtemporal sharp waves that are not clearly epileptiform  REEG 2024 - normal    VEEG  - 2023:  Background Slowing:  -Intermittent diffuse theta and polymorphic delta slowing.    Focal Slowing:   -Intermittent polymorphic delta slowing in the left temporal region    Other Non-Epileptiform Findings:  -Diffuse excess beta activity.    Interictal Epileptiform Activity:   -Rare sharp wave in the left anterior temporal region, maximum F7/T7    Events:  Clinical events: None recorded.  Seizures: None recorded.    REEG 10/2/2020 - normal  AEEG  - 2018 - normal          Assessment:  This is a 54y Female with h/o         Discussed with Dr. Felder        Plan:   - VEEG monitoring for better characterization and assessment  - Seizure precautions  - Ativan 2mg IV PRN for generalized tonic-clonic seizure lasting longer than 2 minutes, or two consecutive seizures without return to baseline in-between (ordered)  - CBC, CMP, Mg, CK, ASM levels trough (ordered)  - Keep Mg above 2    Plan discussed with patient in details, all questions answered.    Discussed with nursing team, hospitalist, and PA. Neurology/Epilepsy Consult:    TED ROSE 54y Female  MRN-[id number]    Patient is a 54y old right-handed Female who presents for elective VEEG        HPI: History obtained from patient. Outpatient records reviewed.  Patient was diagnosed with epilepsy in  when she had first witnessed seizure described as GTC event. She was started on Dilantin and remained clinically seizure-free until 2023. At some point prior to that patient was also prescribed Zonisamide, and it is unclear when and way it was stopped. She is followed as outpatient by Dr. Felder.  In 2023 patient had a cluster of 3 seizures while recovering from pneumonia. Her Dilantin level on admission was only 3.4.   After hospital discharge patient was started on Keppra and Dilantin tapered initiated. She had no other clinical events until summer 2024 when had several unwitnessed events described as feeling strange/needing to sit down, followed by confusion. During one of the episodes in August she recalls feeling her head and arm shaking. By then patient was off Dilantin, Keppra dose was increased.  2024 patient was taken from work to [facility] ED. Patient recalls sitting in a classroom alone feeling intence hot flash that started at the waste and went up. The she woke up on the floor with posterior head laceration feeling confused. Her imaging was reportedly normal, not admitted. Keppra dose was increased and switched to ER formulation. Patient was OK until  when had another event at work that was recorded by security camera. It started again with hot flash, then patient feel backwards and remained unresponsive for about 1 minute followed by confusion. She was seen in the ED, required stapled.   Patient reports having perimenopausal symptoms for the last 18 months with very irregular menses (LMP 10/24/2024, prior 2024, 2024). She experiences occasional hot flashes that are different and less intense than the feeling she had prior to the last 2 events.   She reports being compliant with Keppra, no missed doses, tolerating well. Of note, patient was diagnosed with hyperthyroidism and started medications for it in the Fall of .    Previous trials: Zonisamide, Dilantin        PAST MEDICAL & SURGICAL HISTORY:  Epilepsy  Cervical herniated disc  Thyroid nodules  Hyperthyroidism  Ovarian cysts  H/o bilateral salpingectomy  H/o R oophorectomy        FAMILY HISTORY:  Aunt - seizures        Social History:  Lives alone, has 2 children  Work FT as [employer]   Smoking - 10-10 sig/day (refused nicotine patch)  Denies ETOH/recreational drug use        Risk factors:  Birth history is not available (adopted at 5 mo)  Normal growth and development  No febrile seizures/CNS infections  No head trauma with loss of consciousness          Allergy:  No Known Allergies          Home Medications:  LEVETIRACETAM  MG TABLET: 1 tab orally once a day in the morning & 2 tabs once a day (at bedtime)  Lexapro 10 mg oral tablet: 1 tab(s) orally once a day   methIMAzole 5 mg oral tablet: 2 tab(s) orally once a day (17 Dec 2024 11:28)  Vitamin B6 100mg daily in the morning        MEDICATIONS  (STANDING):  escitalopram 10 milliGRAM(s) Oral daily  levETIRAcetam  milliGRAM(s) Oral <User Schedule>  levETIRAcetam ER 1500 milliGRAM(s) Oral <User Schedule>  methimazole 10 milliGRAM(s) Oral daily  nicotine -  14 mG/24Hr(s) Patch 1 Patch Transdermal daily    MEDICATIONS  (PRN):  acetaminophen     Tablet .. 650 milliGRAM(s) Oral every 6 hours PRN Temp greater or equal to 38C (100.4F), Mild Pain (1 - 3)  aluminum hydroxide/magnesium hydroxide/simethicone Suspension 30 milliLiter(s) Oral every 4 hours PRN Dyspepsia  LORazepam   Injectable 2 milliGRAM(s) IV Push three times a day PRN generalized tonic-clonic seizure lasting longer than 2 minutes, or two consecutive seizures without return to baseline in-between  melatonin 3 milliGRAM(s) Oral at bedtime PRN Insomnia  ondansetron Injectable 4 milliGRAM(s) IV Push every 8 hours PRN Nausea and/or Vomiting            T(F): 98 (24 @ 12:32), Max: 98 (24 @ 12:32)  HR: 67 (24 @ 12:32) (67 - 67)  BP: 111/76 (24 @ 12:32) (111/76 - 111/76)  RR: 18 (24 @ 12:32) (18 - 18)  SpO2: 96% (12-17-24 @ 12:32) (96% - 96%)          Neurologic Examination:  General:  Appearance is consistent with chronologic age.  No abnormal facies.   General: The patient is oriented to person, place, time and date.  Recent and remote memory intact.  Follows 4-step directions. Fund of knowledge is intact and normal.  Language with normal repetition, comprehension and naming.  Nondysarthric.    Cranial nerves: EOMI w/o nystagmus, skew or reported double vision.  PERRL.  No ptosis/weakness of eyelid closure.  Facial sensation is normal with normal bite.  No facial asymmetry.  Hearing grossly intact b/l.  Palate elevates midline.  Tongue midline.  Motor examination:   Normal tone, bulk and range of motion.  No tenderness, twitching, tremors or involuntary movements.  Formal Muscle Strength Testin/5 UE; 5/5 LE.  No observable drift.  Reflexes:   2+ b/l   Sensory examination:   Intact to light touch and pinprick, pain.  Cerebellum:   FTN/HKS intact with normal MARY in all limbs.  No dysmetria or dysdiadokinesia.  Gait narrow based and normal.        Labs:   Levetiracetam Level, Serum: 15.4 ug/mL (24 @ 16:30) - unclear if trough leve            Neuroimaging:  CT Head:   < from: CT Head No Cont (24 @ 10:53) >  IMPRESSION:    CT HEAD:  No acute intracranial pathology. No evidence ofmidline shift, mass   effect or intracranial hemorrhage.    < end of copied text >        MRI Brain with/without contrast 2024 at [facility] - motion limited, normal bilateral hippocampal formations, minimal chronic nonspecific WM changes.            REEG 2024 - normal  REEG 2024 - left midtemporal sharp waves that are not clearly epileptiform  REEG 2024 - normal    VEEG  - 2023:  Background Slowing:  -Intermittent diffuse theta and polymorphic delta slowing.    Focal Slowing:   -Intermittent polymorphic delta slowing in the left temporal region    Other Non-Epileptiform Findings:  -Diffuse excess beta activity.    Interictal Epileptiform Activity:   -Rare sharp wave in the left anterior temporal region, maximum F7/T7    Events:  Clinical events: None recorded.  Seizures: None recorded.    REEG 10/2/2020 - normal  AEEG  - 2018 - normal          Assessment:  This is a 54y Female with h/o seizure disorder, hyperthyroidism, cervical radiculopathy, with recent increase in seizure frequency. Patient reports being compliant with Keppra.         Discussed with Dr. Felder        Plan:   - VEEG monitoring for better characterization and treatment plan  - Seizure precautions  - Continue home dose of Keppra for now (ordered) - use patient own medication supply. Discussed with pharmacy and RN, NFF completed.  - Ativan 2mg IV PRN for generalized tonic-clonic seizure lasting longer than 2 minutes, or two consecutive seizures without return to baseline in-between (ordered)  - CBC, CMP, Mg, Keppra level trough (ordered)  - Keep Mg above 2    Plan discussed with patient in details, all questions answered.    Discussed with nursing team, hospitalist, and PA. Neurology/Epilepsy Consult:    TED ROSE 54y Female  MRN-[id number]    Patient is a 54y old right-handed Female who presents for elective VEEG        HPI: History obtained from patient. Outpatient records reviewed.  Patient was diagnosed with epilepsy in  when she had first witnessed seizure described as GTC event. She was started on Dilantin and remained clinically seizure-free until 2023. At some point prior to that patient was also prescribed Zonisamide, and it is unclear when and way it was stopped. She is followed as outpatient by Dr. Felder.  In 2023 patient had a cluster of 3 seizures while recovering from pneumonia. Her Dilantin level on admission was only 3.4.   After hospital discharge patient was started on Keppra and Dilantin taper initiated.   She had no other clinical events until summer 2024 when had several unwitnessed events described as feeling strange/needing to sit down, followed by confusion. During one of the episodes in August she recalls feeling her head and arm shaking. By then patient was off Dilantin, Keppra dose was increased.  2024 patient was taken from work to [facility] ED. Patient recalls sitting in a classroom alone feeling intense hot flash that started at the waste and went up. Then she woke up on the floor with posterior head pain/laceration feeling confused. Her imaging was reportedly normal, not admitted. As outpatient Keppra dose was increased and switched to ER formulation.   Patient was OK until  when had another event at work that was recorded by security camera. It started again with hot flash, then patient feel backwards and remained unresponsive for about 1 minute followed by confusion. She was seen in the ED, required stapled.   Patient reports having perimenopausal symptoms for the last 18 months with very irregular menses (LMP 10/24/2024, prior 2024, 2024). She experiences occasional hot flashes that are different and less intense than the feeling she had prior to the last 2 events.   She reports being compliant with Keppra, no missed doses, tolerating well. Of note, patient was diagnosed with hyperthyroidism and started medications for it in the Fall of .    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   Smoking - 10-10 sig/day (refused nicotine patch)  Denies ETOH/recreational drug use        Risk factors:  Birth history is not available (adopted at 5 mo)  Normal growth and development  No febrile seizures/CNS infections  No head trauma with loss of consciousness          Allergy:  No Known Allergies          Home Medications:  LEVETIRACETAM  MG TABLET: 1 tab orally once a day in the morning & 2 tabs once a day (at bedtime)  Lexapro 10 mg oral tablet: 1 tab(s) orally once a day   methIMAzole 5 mg oral tablet: 2 tab(s) orally once a day (17 Dec 2024 11:28)  Vitamin B6 100mg daily in the morning        MEDICATIONS  (STANDING):  escitalopram 10 milliGRAM(s) Oral daily  levETIRAcetam  milliGRAM(s) Oral <User Schedule>  levETIRAcetam ER 1500 milliGRAM(s) Oral <User Schedule>  methimazole 10 milliGRAM(s) Oral daily  nicotine -  14 mG/24Hr(s) Patch 1 Patch Transdermal daily    MEDICATIONS  (PRN):  acetaminophen     Tablet .. 650 milliGRAM(s) Oral every 6 hours PRN Temp greater or equal to 38C (100.4F), Mild Pain (1 - 3)  aluminum hydroxide/magnesium hydroxide/simethicone Suspension 30 milliLiter(s) Oral every 4 hours PRN Dyspepsia  LORazepam   Injectable 2 milliGRAM(s) IV Push three times a day PRN generalized tonic-clonic seizure lasting longer than 2 minutes, or two consecutive seizures without return to baseline in-between  melatonin 3 milliGRAM(s) Oral at bedtime PRN Insomnia  ondansetron Injectable 4 milliGRAM(s) IV Push every 8 hours PRN Nausea and/or Vomiting            T(F): 98 (24 @ 12:32), Max: 98 (24 @ 12:32)  HR: 67 (24 @ 12:32) (67 - 67)  BP: 111/76 (24 @ 12:32) (111/76 - 111/76)  RR: 18 (24 @ 12:32) (18 - 18)  SpO2: 96% (12-17-24 @ 12:32) (96% - 96%)          Neurologic Examination:  General:  Appearance is consistent with chronologic age.  No abnormal facies.   General: The patient is oriented to person, place, time and date.  Recent and remote memory intact.  Follows 4-step directions. Fund of knowledge is intact and normal.  Language with normal repetition, comprehension and naming.  Nondysarthric.    Cranial nerves: EOMI w/o nystagmus, skew or reported double vision.  PERRL.  No ptosis/weakness of eyelid closure.  Facial sensation is normal with normal bite.  No facial asymmetry.  Hearing grossly intact b/l.  Palate elevates midline.  Tongue midline.  Motor examination:   Normal tone, bulk and range of motion.  No tenderness, twitching, tremors or involuntary movements.  Formal Muscle Strength Testin/5 UE; 5/5 LE.  No observable drift.  Reflexes:   2+ b/l   Sensory examination:   Intact to light touch and pinprick, pain.  Cerebellum:   FTN/HKS intact with normal MARY in all limbs.  No dysmetria or dysdiadokinesia.  Gait narrow based and normal.        Labs:   Levetiracetam Level, Serum: 15.4 ug/mL (24 @ 16:30) - unclear if trough leve            Neuroimaging:  CT Head:   < from: CT Head No Cont (24 @ 10:53) >  IMPRESSION:    CT HEAD:  No acute intracranial pathology. No evidence ofmidline shift, mass   effect or intracranial hemorrhage.    < end of copied text >        MRI Brain with/without contrast 2024 at [facility] - motion limited, normal bilateral hippocampal formations, minimal chronic nonspecific WM changes.            REEG 2024 - normal  REEG 2024 - left midtemporal sharp waves that are not clearly epileptiform  REEG 2024 - normal    VEEG  - 2023:  Background Slowing:  -Intermittent diffuse theta and polymorphic delta slowing.    Focal Slowing:   -Intermittent polymorphic delta slowing in the left temporal region    Other Non-Epileptiform Findings:  -Diffuse excess beta activity.    Interictal Epileptiform Activity:   -Rare sharp wave in the left anterior temporal region, maximum F7/T7    Events:  Clinical events: None recorded.  Seizures: None recorded.    REEG 10/2/2020 - normal  AEEG  - 2018 - normal          Assessment:  This is a 54y Female with h/o seizure disorder, hyperthyroidism, cervical radiculopathy, with recent increase in seizure frequency. Patient reports being compliant with Keppra.         Discussed with Dr. Felder        Plan:   - VEEG monitoring for better characterization and treatment plan  - Seizure precautions  - Continue home dose of Keppra for now (ordered) - use patient own medication supply. Discussed with pharmacy and RN, NFF completed.  - Ativan 2mg IV PRN for generalized tonic-clonic seizure lasting longer than 2 minutes, or two consecutive seizures without return to baseline in-between (ordered)  - CBC, CMP, Mg, Keppra level trough (ordered)  - Keep Mg above 2    Plan discussed with patient in details, all questions answered.    Discussed with nursing team, hospitalist, and PA. Neurology/Epilepsy Consult:    TED ROSE 54y Female  MRN-[id number]    Patient is a 54y old right-handed Female who presents for elective VEEG        HPI: History obtained from patient. Outpatient records reviewed.  Patient was diagnosed with epilepsy in  when she had first witnessed seizure described as GTC event. She was started on Dilantin and remained clinically seizure-free until 2023. At some point prior to that patient was also prescribed Zonisamide, and it is unclear when and way it was stopped. She is followed as outpatient by Dr. Felder.  In 2023 patient had a cluster of 3 seizures while recovering from pneumonia. Her Dilantin level on admission was only 3.4.   After hospital discharge patient was started on Keppra and Dilantin taper initiated.   She had no other clinical events until summer 2024 when had several unwitnessed events described as feeling strange/needing to sit down, followed by confusion. During one of the episodes in August she recalls feeling her head and arm shaking. By then patient was off Dilantin, Keppra dose was increased.  2024 patient was taken from work to [facility] ED. Patient recalls sitting in a classroom alone feeling intense hot flash that started at the waste and went up. Then she woke up on the floor with posterior head pain/laceration feeling confused. Her imaging was reportedly normal, not admitted. As outpatient Keppra dose was increased and switched to ER formulation.   Patient was OK until  when had another event at work that was recorded by security camera. It started again with hot flash, then patient feel backwards and remained unresponsive for about 1 minute followed by confusion. She was seen in the ED, required staples, + right tongue bite.   Patient reports having perimenopausal symptoms for the last 18 months with very irregular menses (LMP 10/24/2024, prior 2024, 2024). She experiences occasional hot flashes that are different and less intense than the feeling she had prior to the last 2 events.   She reports being compliant with Keppra, no missed doses, tolerating well. Of note, patient was diagnosed with hyperthyroidism and started medications for it in the Fall of .    Previous trials: Zonisamide, Dilantin        PAST MEDICAL & SURGICAL HISTORY:  Epilepsy  Cervical herniated disc  Thyroid nodules  Hyperthyroidism  Ovarian cysts  H/o bilateral salpingectomy  H/o R oophorectomy        FAMILY HISTORY:  Aunt - seizures        Social History:  Lives alone, has 2 children  Work FT as [employer]   Smoking - 10-10 sig/day (refused nicotine patch)  Denies ETOH/recreational drug use        Risk factors:  Birth history is not available (adopted at 5 mo)  Normal growth and development  No febrile seizures/CNS infections  No head trauma with loss of consciousness          Allergy:  No Known Allergies          Home Medications:  LEVETIRACETAM  MG TABLET: 1 tab orally once a day in the morning & 2 tabs once a day (at bedtime)  Lexapro 10 mg oral tablet: 1 tab(s) orally once a day   methIMAzole 5 mg oral tablet: 2 tab(s) orally once a day (17 Dec 2024 11:28)  Vitamin B6 100mg daily in the morning        MEDICATIONS  (STANDING):  escitalopram 10 milliGRAM(s) Oral daily  levETIRAcetam  milliGRAM(s) Oral <User Schedule>  levETIRAcetam ER 1500 milliGRAM(s) Oral <User Schedule>  methimazole 10 milliGRAM(s) Oral daily  nicotine -  14 mG/24Hr(s) Patch 1 Patch Transdermal daily    MEDICATIONS  (PRN):  acetaminophen     Tablet .. 650 milliGRAM(s) Oral every 6 hours PRN Temp greater or equal to 38C (100.4F), Mild Pain (1 - 3)  aluminum hydroxide/magnesium hydroxide/simethicone Suspension 30 milliLiter(s) Oral every 4 hours PRN Dyspepsia  LORazepam   Injectable 2 milliGRAM(s) IV Push three times a day PRN generalized tonic-clonic seizure lasting longer than 2 minutes, or two consecutive seizures without return to baseline in-between  melatonin 3 milliGRAM(s) Oral at bedtime PRN Insomnia  ondansetron Injectable 4 milliGRAM(s) IV Push every 8 hours PRN Nausea and/or Vomiting            T(F): 98 (24 @ 12:32), Max: 98 (24 @ 12:32)  HR: 67 (24 @ 12:32) (67 - 67)  BP: 111/76 (24 @ 12:32) (111/76 - 111/76)  RR: 18 (24 @ 12:32) (18 - 18)  SpO2: 96% (12-17-24 @ 12:32) (96% - 96%)          Neurologic Examination:  General:  Appearance is consistent with chronologic age.  No abnormal facies.   General: The patient is oriented to person, place, time and date.  Recent and remote memory intact.  Follows 4-step directions. Fund of knowledge is intact and normal.  Language with normal repetition, comprehension and naming.  Nondysarthric.    Cranial nerves: EOMI w/o nystagmus, skew or reported double vision.  PERRL.  No ptosis/weakness of eyelid closure.  Facial sensation is normal with normal bite.  No facial asymmetry.  Hearing grossly intact b/l.  Palate elevates midline.  Tongue midline.  Motor examination:   Normal tone, bulk and range of motion.  No tenderness, twitching, tremors or involuntary movements.  Formal Muscle Strength Testin/5 UE; 5/5 LE.  No observable drift.  Reflexes:   2+ b/l   Sensory examination:   Intact to light touch and pinprick, pain.  Cerebellum:   FTN/HKS intact with normal MARY in all limbs.  No dysmetria or dysdiadokinesia.  Gait narrow based and normal.        Labs:   Levetiracetam Level, Serum: 15.4 ug/mL (24 @ 16:30) - unclear if trough leve            Neuroimaging:  CT Head:   < from: CT Head No Cont (24 @ 10:53) >  IMPRESSION:    CT HEAD:  No acute intracranial pathology. No evidence ofmidline shift, mass   effect or intracranial hemorrhage.    < end of copied text >        MRI Brain with/without contrast 2024 at [facility] - motion limited, normal bilateral hippocampal formations, minimal chronic nonspecific WM changes.            REEG 2024 - normal  REEG 2024 - left midtemporal sharp waves that are not clearly epileptiform  REEG 2024 - normal    VEEG  - 2023:  Background Slowing:  -Intermittent diffuse theta and polymorphic delta slowing.    Focal Slowing:   -Intermittent polymorphic delta slowing in the left temporal region    Other Non-Epileptiform Findings:  -Diffuse excess beta activity.    Interictal Epileptiform Activity:   -Rare sharp wave in the left anterior temporal region, maximum F7/T7    Events:  Clinical events: None recorded.  Seizures: None recorded.    REEG 10/2/2020 - normal  AEEG  - 2018 - normal          Assessment:  This is a 54y Female with h/o seizure disorder, hyperthyroidism, cervical radiculopathy, with recent increase in seizure frequency. Patient reports being compliant with Keppra.         Discussed with Dr. Felder        Plan:   - VEEG monitoring for better characterization and treatment plan  - Seizure precautions  - Continue home dose of Keppra for now (ordered) - use patient own medication supply. Discussed with pharmacy and RN, NFF completed.  - Ativan 2mg IV PRN for generalized tonic-clonic seizure lasting longer than 2 minutes, or two consecutive seizures without return to baseline in-between (ordered)  - CBC, CMP, Mg, Keppra level trough (ordered)  - Keep Mg above 2    Plan discussed with patient in details, all questions answered.    Discussed with nursing team, hospitalist, and PA.

## 2024-12-18 PROCEDURE — 99232 SBSQ HOSP IP/OBS MODERATE 35: CPT

## 2024-12-18 PROCEDURE — 95720 EEG PHY/QHP EA INCR W/VEEG: CPT

## 2024-12-18 PROCEDURE — 99231 SBSQ HOSP IP/OBS SF/LOW 25: CPT

## 2024-12-18 PROCEDURE — 93010 ELECTROCARDIOGRAM REPORT: CPT

## 2024-12-18 RX ORDER — LEVETIRACETAM 1000 MG/1
1000 TABLET ORAL AT BEDTIME
Refills: 0 | Status: DISCONTINUED | OUTPATIENT
Start: 2024-12-18 | End: 2024-12-18

## 2024-12-18 RX ORDER — LEVETIRACETAM 1000 MG/1
750 TABLET ORAL EVERY 12 HOURS
Refills: 0 | Status: DISCONTINUED | OUTPATIENT
Start: 2024-12-18 | End: 2024-12-19

## 2024-12-18 RX ORDER — PYRIDOXINE HCL (VITAMIN B6) 25 MG
100 TABLET ORAL EVERY 24 HOURS
Refills: 0 | Status: DISCONTINUED | OUTPATIENT
Start: 2024-12-18 | End: 2024-12-20

## 2024-12-18 RX ADMIN — Medication 100 MILLIGRAM(S): at 10:08

## 2024-12-18 RX ADMIN — ESCITALOPRAM OXALATE 10 MILLIGRAM(S): 10 TABLET, FILM COATED ORAL at 11:01

## 2024-12-18 RX ADMIN — LEVETIRACETAM 750 MILLIGRAM(S): 1000 TABLET ORAL at 10:08

## 2024-12-18 RX ADMIN — LEVETIRACETAM 750 MILLIGRAM(S): 1000 TABLET ORAL at 21:20

## 2024-12-19 LAB
T3 SERPL-MCNC: 115 NG/DL — SIGNIFICANT CHANGE UP (ref 80–200)
T4 AB SER-ACNC: 7.4 UG/DL — SIGNIFICANT CHANGE UP (ref 4.6–12)
TSH SERPL-MCNC: 1.69 UIU/ML — SIGNIFICANT CHANGE UP (ref 0.27–4.2)

## 2024-12-19 PROCEDURE — 99231 SBSQ HOSP IP/OBS SF/LOW 25: CPT

## 2024-12-19 PROCEDURE — 95720 EEG PHY/QHP EA INCR W/VEEG: CPT

## 2024-12-19 PROCEDURE — 99232 SBSQ HOSP IP/OBS MODERATE 35: CPT

## 2024-12-19 RX ORDER — LEVETIRACETAM 1000 MG/1
500 TABLET ORAL EVERY 12 HOURS
Refills: 0 | Status: DISCONTINUED | OUTPATIENT
Start: 2024-12-19 | End: 2024-12-20

## 2024-12-19 RX ADMIN — ESCITALOPRAM OXALATE 10 MILLIGRAM(S): 10 TABLET, FILM COATED ORAL at 10:11

## 2024-12-19 RX ADMIN — LEVETIRACETAM 500 MILLIGRAM(S): 1000 TABLET ORAL at 21:23

## 2024-12-19 RX ADMIN — LEVETIRACETAM 750 MILLIGRAM(S): 1000 TABLET ORAL at 10:11

## 2024-12-19 RX ADMIN — Medication 100 MILLIGRAM(S): at 10:11

## 2024-12-19 NOTE — PROGRESS NOTE ADULT - ASSESSMENT
#epilepsy - on keppra at home - dose recently increased in october  here for elective veeg  breakthrough seizures  seizure precautions  check keppra level, cbc, cmp, mg    #hyperthyroid - on methimazole - repeat TFT are WNL   , needs outpt endocrine eval, uptake scan, ekg done today - wnl , single lead twi reviewed myself   asymptomatic , needs outpt MCOT to rule out arrythmia - no np on  - will give info to follow up with Dr Bucio - EPS    #smoking cessation , does not want nicotine patch.VEEG in the last 24 hours:    Background - continuous, symmetrical, well organized, reaching frequencies in the range of 8-9 Hz, showing good reactivity with higher amplitude from the left side    Focal and generalized slowin. no generalized slowing  2. mild to moderate left hemispheric mainly centrotemporal focal slowing, more prominently seen during drowsiness    Interictal activity - occasional left midtemporal sharp transients and rare sharp waves that appear epileptiform    Events - none    Seizures - none    Impression: Abnormal VEEG as above    Plan -   Will continue monitoring until tomorrow  Seizure precautions  Decrease Keppra dose to 500mg q12hrs    dicsussed with neuro     
#epilepsy - on keppra at home - dose recently increased in october  here for elective veeg  breakthrough seizures  seizure precautions  check keppra level, cbc, cmp, mg    #hyperthyroid - on methimazole - repeat TFTs, needs outpt endocrine eval, uptake scan, ekg done today - wnl , single lead twi reviewed myself   asymptomatic , needs outpt MCOT to rule out arrythmia     #smoking cessation , does not want nicotine patch.VEEG in the last 24 hours:    Background - continuous, symmetrical, well organized, reaching frequencies in the range of 8-9 Hz, showing good reactivity with higher amplitude from the left side    Focal and generalized slowin. no generalized slowing  2. borderline to mild left mainly mid to posterior temporal focal slowing    Interictal activity - rare to occasional left midtemporal sharp transients    Events - none    Seizures - none    Impression: Abnormal VEEG as above    Plan -   Will continue monitoring  Seizure precautions  Change Keppra to regular release, decrease dose to 750mg q12hrs    discussed with neuro

## 2024-12-19 NOTE — CHART NOTE - NSCHARTNOTEFT_GEN_A_CORE
Asked by primary team to place MCOT.    *MCOT placed on patient and patient educated on its care, use and function.  Patient expressed understanding of all instructions.  Patient to follow up with EP as outpatient.      *MCOT Instructions:  - Please wear now for 30 days  - Please return package back with prepaid envelope the day after your monitoring is complete (sensor, monitor, chargers, etc)  - Sensor must be charged every 5-7 days  - Monitor must be charged daily  - Change patch once a week, sooner if soiled or not adhering to skin  - Fu/u appt with EPS to discuss results    Appointment:  Dr. Haney  1/23/2025  [id number] [facility]    Patient was reluctant to be educated on the MCOT and its use/ function.  I explained in great detail why the device was being placed-   She verbalized comprehension of all instructions and agreed to have it placed.  Patients cousin at bedside aware and has experience with MCOTS.

## 2024-12-20 ENCOUNTER — TRANSCRIPTION ENCOUNTER (OUTPATIENT)
Age: 54
End: 2024-12-20

## 2024-12-20 VITALS
DIASTOLIC BLOOD PRESSURE: 85 MMHG | SYSTOLIC BLOOD PRESSURE: 142 MMHG | RESPIRATION RATE: 18 BRPM | HEART RATE: 59 BPM | TEMPERATURE: 97 F | OXYGEN SATURATION: 97 %

## 2024-12-20 LAB — LEVETIRACETAM SERPL-MCNC: 10.6 UG/ML — SIGNIFICANT CHANGE UP (ref 10–40)

## 2024-12-20 PROCEDURE — 99231 SBSQ HOSP IP/OBS SF/LOW 25: CPT

## 2024-12-20 PROCEDURE — 95720 EEG PHY/QHP EA INCR W/VEEG: CPT

## 2024-12-20 PROCEDURE — 99239 HOSP IP/OBS DSCHRG MGMT >30: CPT

## 2024-12-20 PROCEDURE — 95718 EEG PHYS/QHP 2-12 HR W/VEEG: CPT

## 2024-12-20 RX ORDER — LEVETIRACETAM 1000 MG/1
500 TABLET ORAL ONCE
Refills: 0 | Status: COMPLETED | OUTPATIENT
Start: 2024-12-20 | End: 2024-12-20

## 2024-12-20 RX ORDER — LEVETIRACETAM 1000 MG/1
2 TABLET ORAL
Refills: 0 | DISCHARGE

## 2024-12-20 RX ORDER — LEVETIRACETAM 1000 MG/1
750 TABLET ORAL ONCE
Refills: 0 | Status: COMPLETED | OUTPATIENT
Start: 2024-12-20 | End: 2024-12-20

## 2024-12-20 RX ORDER — PYRIDOXINE HCL (VITAMIN B6) 25 MG
1 TABLET ORAL
Qty: 0 | Refills: 0 | DISCHARGE
Start: 2024-12-20

## 2024-12-20 RX ORDER — LEVETIRACETAM 1000 MG/1
1 TABLET ORAL
Refills: 0 | DISCHARGE

## 2024-12-20 RX ADMIN — LEVETIRACETAM 500 MILLIGRAM(S): 1000 TABLET ORAL at 11:37

## 2024-12-20 RX ADMIN — ESCITALOPRAM OXALATE 10 MILLIGRAM(S): 10 TABLET, FILM COATED ORAL at 11:03

## 2024-12-20 RX ADMIN — Medication 100 MILLIGRAM(S): at 09:31

## 2024-12-20 RX ADMIN — LEVETIRACETAM 750 MILLIGRAM(S): 1000 TABLET ORAL at 09:25

## 2024-12-20 NOTE — DISCHARGE NOTE NURSING/CASE MANAGEMENT/SOCIAL WORK - PATIENT PORTAL LINK FT
You can access the FollowMyHealth Patient Portal offered by [facility] by registering at the following website: [email or web address]. By joining [facility]’s FollowMyHealth portal, you will also be able to view your health information using other applications (apps) compatible with our system.

## 2024-12-20 NOTE — DISCHARGE NOTE PROVIDER - NSDCMRMEDTOKEN_GEN_ALL_CORE_FT
Levetiracetam extended release 500mg oral tablet: take 2 tablets in the morning &amp; 3 tablets at bedtime  Lexapro 10 mg oral tablet: 1 tab(s) orally once a day  methIMAzole 5 mg oral tablet: 2 tab(s) orally once a day  pyridoxine 100 mg oral tablet: 1 tab(s) orally every 24 hours

## 2024-12-20 NOTE — DISCHARGE NOTE PROVIDER - CARE PROVIDERS DIRECT ADDRESSES
[email or web address]

## 2024-12-20 NOTE — DISCHARGE NOTE PROVIDER - NSDCFUSCHEDAPPT_GEN_ALL_CORE_FT
Parminder Haney  [facility]  ELECTROPH [address]  Scheduled Appointment: 01/15/2025    Parminder Haney  [facility]  ELECTROPH [address]  Scheduled Appointment: 01/23/2025    Araceli Loera  [facility]  ENDOCRIN [address]  Scheduled Appointment: 02/04/2025    Hugo Felder  [facility]  NEUROLOGY [address]  Scheduled Appointment: 02/19/2025

## 2024-12-20 NOTE — DISCHARGE NOTE PROVIDER - PROVIDER TOKENS
PROVIDER:[TOKEN:[[id number]],SCHEDULEDAPPT:[02/19/2025],SCHEDULEDAPPTTIME:[03:30 PM],ESTABLISHEDPATIENT:[T]],PROVIDER:[TOKEN:[[id number]],SCHEDULEDAPPT:[01/23/2025]]

## 2024-12-20 NOTE — PROGRESS NOTE ADULT - SUBJECTIVE AND OBJECTIVE BOX
Epilepsy Attending Note:     TED ROSE    54y Female  MRN [id number]    Vital Signs Last 24 Hrs  T(C): 36.3 (18 Dec 2024 04:30), Max: 36.8 (17 Dec 2024 20:59)  T(F): 97.4 (18 Dec 2024 04:30), Max: 98.2 (17 Dec 2024 20:59)  HR: 57 (18 Dec 2024 04:30) (57 - 67)  BP: 128/85 (18 Dec 2024 04:30) (111/76 - 128/85)  BP(mean): --  RR: 18 (18 Dec 2024 04:30) (18 - 18)  SpO2: 96% (18 Dec 2024 04:30) (95% - 96%)    Parameters below as of 18 Dec 2024 04:30  Patient On (Oxygen Delivery Method): room air                              13.5   9.16  )-----------( 241      ( 17 Dec 2024 19:23 )             40.3           139  |  104  |  22[H]  ----------------------------<  86  4.3   |  26  |  0.9    Ca    8.8      17 Dec 2024 19:23  Mg     2.4         TPro  6.2  /  Alb  3.9  /  TBili  <0.2  /  DBili  x   /  AST  23  /  ALT  31  /  AlkPhos  160[H]        MEDICATIONS  (STANDING):  escitalopram 10 milliGRAM(s) Oral daily  levETIRAcetam 1000 milliGRAM(s) Oral at bedtime  methimazole 10 milliGRAM(s) Oral daily  nicotine -  14 mG/24Hr(s) Patch 1 Patch Transdermal daily  pyridoxine 100 milliGRAM(s) Oral every 24 hours    MEDICATIONS  (PRN):  acetaminophen     Tablet .. 650 milliGRAM(s) Oral every 6 hours PRN Temp greater or equal to 38C (100.4F), Mild Pain (1 - 3)  aluminum hydroxide/magnesium hydroxide/simethicone Suspension 30 milliLiter(s) Oral every 4 hours PRN Dyspepsia  LORazepam   Injectable 2 milliGRAM(s) IV Push three times a day PRN generalized tonic-clonic seizure lasting longer than 2 minutes, or two consecutive seizures without return to baseline in-between  melatonin 3 milliGRAM(s) Oral at bedtime PRN Insomnia  ondansetron Injectable 4 milliGRAM(s) IV Push every 8 hours PRN Nausea and/or Vomiting            VEEG in the last 24 hours:    Background - continuous, symmetrical, well organized, reaching frequencies in the range of 8-9 Hz, showing good reactivity with higher amplitude from the left side    Focal and generalized slowin. no generalized slowing  2. borderline to mild left mainly mid to posterior temporal focal slowing    Interictal activity - rare to occasional left midtemporal sharp transients    Events - none    Seizures - none    Impression: Abnormal VEEG as above    Plan -   Will continue monitoring  Seizure precautions  Change Keppra to regular release, decrease dose to 750mg q12hrs  
Epilepsy Attending Note:     TED ROSE    54y Female  MRN [id number]    Vital Signs Last 24 Hrs  T(C): 36.6 (19 Dec 2024 04:30), Max: 36.8 (18 Dec 2024 13:24)  T(F): 97.8 (19 Dec 2024 04:30), Max: 98.3 (18 Dec 2024 20:46)  HR: 57 (19 Dec 2024 04:30) (57 - 75)  BP: 109/72 (19 Dec 2024 04:30) (109/72 - 121/65)  BP(mean): --  RR: 18 (19 Dec 2024 04:30) (18 - 18)  SpO2: 96% (19 Dec 2024 04:30) (96% - 96%)    Parameters below as of 19 Dec 2024 04:30  Patient On (Oxygen Delivery Method): room air                              13.5   9.16  )-----------( 241      ( 17 Dec 2024 19:23 )             40.3       12-    139  |  104  |  22[H]  ----------------------------<  86  4.3   |  26  |  0.9    Ca    8.8      17 Dec 2024 19:23  Mg     2.4     12-    TPro  6.2  /  Alb  3.9  /  TBili  <0.2  /  DBili  x   /  AST  23  /  ALT  31  /  AlkPhos  160[H]  12-17      MEDICATIONS  (STANDING):  escitalopram 10 milliGRAM(s) Oral daily  levETIRAcetam 500 milliGRAM(s) Oral every 12 hours  methimazole 10 milliGRAM(s) Oral daily  nicotine -  14 mG/24Hr(s) Patch 1 Patch Transdermal daily  pyridoxine 100 milliGRAM(s) Oral every 24 hours    MEDICATIONS  (PRN):  acetaminophen     Tablet .. 650 milliGRAM(s) Oral every 6 hours PRN Temp greater or equal to 38C (100.4F), Mild Pain (1 - 3)  aluminum hydroxide/magnesium hydroxide/simethicone Suspension 30 milliLiter(s) Oral every 4 hours PRN Dyspepsia  LORazepam   Injectable 2 milliGRAM(s) IV Push three times a day PRN generalized tonic-clonic seizure lasting longer than 2 minutes, or two consecutive seizures without return to baseline in-between  melatonin 3 milliGRAM(s) Oral at bedtime PRN Insomnia  ondansetron Injectable 4 milliGRAM(s) IV Push every 8 hours PRN Nausea and/or Vomiting            VEEG in the last 24 hours:    Background - continuous, symmetrical, well organized, reaching frequencies in the range of 8-9 Hz, showing good reactivity with higher amplitude from the left side    Focal and generalized slowin. no generalized slowing  2. mild to moderate left hemispheric mainly centrotemporal focal slowing, more prominently seen during drowsiness    Interictal activity - occasional left midtemporal sharp transients and rare sharp waves that appear epileptiform    Events - none    Seizures - none    Impression: Abnormal VEEG as above    Plan -   Will continue monitoring until tomorrow  Seizure precautions  Decrease Keppra dose to 500mg q12hrs    
Epilepsy Team Discharge Note:    VEEG monitoring completed, patient is cleared for discharge from neurology standpoint.    Follow up neurology appointment is scheduled with Dr. Felder   for February 19, 2025 at 3:30 pm.    [address] [place] [address], [address]  [phone]    Discharge seizure medications are:  Keppra ER 1000mg in the morning & 1500mg at bedtime (dose increased)    Rx sent to the pharmacy.    Continue Vitamin B6 100mg daily.    Patient was also given Rx for CBC, CMP, Keppra level trough to be done prior to follow up.    Follow up with EP as scheduled in January 2025.    Detailed written and verbal instructions regarding seizure precautions and follow up plan are given to the patient, all questions answered. Patient verbalized understanding.    Discussed with medical and nursing teams.  
Epilepsy Attending Note:     TED ROSE    54y Female  MRN [id number]    Vital Signs Last 24 Hrs  T(C): 36.3 (20 Dec 2024 04:30), Max: 36.6 (19 Dec 2024 14:49)  T(F): 97.3 (20 Dec 2024 04:30), Max: 97.8 (19 Dec 2024 14:49)  HR: 59 (20 Dec 2024 04:30) (59 - 74)  BP: 142/85 (20 Dec 2024 04:30) (129/79 - 142/85)  BP(mean): --  RR: 18 (20 Dec 2024 04:30) (18 - 18)  SpO2: 97% (20 Dec 2024 04:30) (95% - 97%)    Parameters below as of 20 Dec 2024 04:30  Patient On (Oxygen Delivery Method): room air                    MEDICATIONS  (STANDING):  escitalopram 10 milliGRAM(s) Oral daily  methimazole 10 milliGRAM(s) Oral daily  nicotine -  14 mG/24Hr(s) Patch 1 Patch Transdermal daily  pyridoxine 100 milliGRAM(s) Oral every 24 hours    MEDICATIONS  (PRN):  acetaminophen     Tablet .. 650 milliGRAM(s) Oral every 6 hours PRN Temp greater or equal to 38C (100.4F), Mild Pain (1 - 3)  aluminum hydroxide/magnesium hydroxide/simethicone Suspension 30 milliLiter(s) Oral every 4 hours PRN Dyspepsia  LORazepam   Injectable 2 milliGRAM(s) IV Push three times a day PRN generalized tonic-clonic seizure lasting longer than 2 minutes, or two consecutive seizures without return to baseline in-between  melatonin 3 milliGRAM(s) Oral at bedtime PRN Insomnia  ondansetron Injectable 4 milliGRAM(s) IV Push every 8 hours PRN Nausea and/or Vomiting            VEEG in the last 24 hours:    Background - continuous, symmetrical, well organized, reaching frequencies in the range of 8-9 Hz, showing good reactivity with higher amplitude from the left side    Focal and generalized slowin. no generalized slowing  2. mild to moderate left hemispheric mainly centrotemporal focal slowing, more prominently seen during drowsiness    Interictal activity:  1. occasional left midtemporal sharp transients and rare sharp waves that appear epileptiform  2. sharply contoured or notched theta over left TC region    Events - none    Seizures - none    Impression: Abnormal VEEG as above. Level sent on admission still pending. MCOT monitor in place per cardiology.    Plan -   Will discontinue monitoring   Give Keppra 750mg x 1 now, then 500mg x 1 prior to discharge  Discharge on Keppra ER 1000mg AM and 1500mg at bedtime  Follow up as scheduled with level  Follow up with EP.  
    TED ROSE  54y, Female  Allergy: No Known Allergies      CHIEF COMPLAINT: VEEG (18 Dec 2024 12:04)      HPI:  53yo F current smoker with PMHx seizure disorder (on kera, Dr. Felder), hyperthyroidism, anxiety here for Veeg admission as rec'ed by her outpt neurologist. Denies fever, chills, recent illnesses, V/N/D, abdominal pain. Patient reports last seizure reported December 9th.  (17 Dec 2024 10:50)    HPI:    FAMILY HISTORY:    PAST MEDICAL & SURGICAL HISTORY:  History of epilepsy      Pulmonary nodule      Cervical herniated disc      Pancreatic enlargement  tail of pancreas enlarged/inflammed      Hyperthyroidism      H/O bilateral salpingectomy      H/O unilateral oophorectomy          SOCIAL HISTORY  Social History:  pt reports she currently smokes ( less than 1 pack per day)   denies alcohol and illicit drug use (17 Dec 2024 10:50)      Home Medications:  levETIRAcetam 750 mg oral tablet, extended release: 2 tab(s) orally once a day (at bedtime) (17 Dec 2024 11:29)  LEVETIRACETAM  MG TABLET: 1 tab(s) orally once a day (in the morning) once a day in the morning (17 Dec 2024 11:27)  Lexapro 10 mg oral tablet: 1 tab(s) orally once a day (17 Dec 2024 11:30)  methIMAzole 5 mg oral tablet: 2 tab(s) orally once a day (17 Dec 2024 11:28)      ROS  General: Denies fevers, chills, nightsweats, weight loss  HEENT: Denies headache, rhinorrhea, sore throat, eye pain  CV: Denies CP, palpitations  PULM: Denies SOB, cough  GI: Denies abdominal pain, diarrhea  : Denies dysuria, hematuria  MSK: Denies arthralgias  SKIN: Denies rash   NEURO: Denies paresthesias, weakness  PSYCH: Denies depression    VITALS:  T(F): 98.2, Max: 98.2 (12-17-24 @ 20:59)  HR: 68  BP: 118/76  RR: 18Vital Signs Last 24 Hrs  T(C): 36.8 (18 Dec 2024 13:24), Max: 36.8 (17 Dec 2024 20:59)  T(F): 98.2 (18 Dec 2024 13:24), Max: 98.2 (17 Dec 2024 20:59)  HR: 68 (18 Dec 2024 13:24) (57 - 68)  BP: 118/76 (18 Dec 2024 13:24) (118/76 - 128/85)  BP(mean): --  RR: 18 (18 Dec 2024 04:30) (18 - 18)  SpO2: 96% (18 Dec 2024 04:30) (95% - 96%)    Parameters below as of 18 Dec 2024 04:30  Patient On (Oxygen Delivery Method): room air        PHYSICAL EXAM:  Gen: NAD, resting in bed  HEENT: Normocephalic, atraumatic  Neck: supple, no lymphadenopathy  CV: Regular rate & regular rhythm  Lungs: CTABL no wheeze  Abdomen: Soft, NTND+ BS present  Ext: Warm, well perfused no CCE  Neuro: non focal, awake, CN II-XII intact   Skin: no rash, no erythema  Psych: no SI, HI, Hallucination     TESTS & MEASUREMENTS:                        13.5   9.16  )-----------( 241      ( 17 Dec 2024 19:23 )             40.3     12-17    139  |  104  |  22[H]  ----------------------------<  86  4.3   |  26  |  0.9    Ca    8.8      17 Dec 2024 19:23  Mg     2.4     12-17    TPro  6.2  /  Alb  3.9  /  TBili  <0.2  /  DBili  x   /  AST  23  /  ALT  31  /  AlkPhos  160[H]  12-17      LIVER FUNCTIONS - ( 17 Dec 2024 19:23 )  Alb: 3.9 g/dL / Pro: 6.2 g/dL / ALK PHOS: 160 U/L / ALT: 31 U/L / AST: 23 U/L / GGT: x           Urinalysis Basic - ( 17 Dec 2024 19:23 )    Color: x / Appearance: x / SG: x / pH: x  Gluc: 86 mg/dL / Ketone: x  / Bili: x / Urobili: x   Blood: x / Protein: x / Nitrite: x   Leuk Esterase: x / RBC: x / WBC x   Sq Epi: x / Non Sq Epi: x / Bacteria: x            QRS axis to [] ° and NSR at a rate of [] BPM. There was no atrial enlargement. There was no ventricular hypertrophy. There were no ST-T changes and all intervals were normal.      INFECTIOUS DISEASES TESTING      RADIOLOGY & ADDITIONAL TESTS:  I have personally reviewed the last Chest xray  CXR      CT      CARDIOLOGY TESTING  12 Lead ECG:   Ventricular Rate 65 BPM    Atrial Rate 65 BPM    P-R Interval 160 ms    QRS Duration 82 ms    Q-T Interval 450 ms    QTC Calculation(Bazett) 468 ms    P Axis 74 degrees    R Axis 63 degrees    T Axis 56 degrees    Diagnosis Line Normal sinus rhythm  Possible Anterior infarct , age undetermined  Abnormal ECG    Confirmed by Fabien Sosa ([id number]) on 12/18/2024 1:17:32 PM (12-18-24 @ 12:01)  12 Lead ECG:   Ventricular Rate 64 BPM    Atrial Rate 64 BPM    P-R Interval 164 ms    QRS Duration 84 ms    Q-T Interval 436 ms    QTC Calculation(Bazett) 449 ms    P Axis 82 degrees    R Axis 43 degrees    T Axis 36 degrees    Diagnosis Line Normal sinus rhythm  Normal ECG    Confirmed by MINH SMITH MD ([id number]) on 12/10/2024 10:04:48 AM (12-09-24 @ 12:03)      MEDICATIONS  (STANDING):  escitalopram 10 milliGRAM(s) Oral daily  levETIRAcetam 750 milliGRAM(s) Oral every 12 hours  methimazole 10 milliGRAM(s) Oral daily  nicotine -  14 mG/24Hr(s) Patch 1 Patch Transdermal daily  pyridoxine 100 milliGRAM(s) Oral every 24 hours    MEDICATIONS  (PRN):  acetaminophen     Tablet .. 650 milliGRAM(s) Oral every 6 hours PRN Temp greater or equal to 38C (100.4F), Mild Pain (1 - 3)  aluminum hydroxide/magnesium hydroxide/simethicone Suspension 30 milliLiter(s) Oral every 4 hours PRN Dyspepsia  LORazepam   Injectable 2 milliGRAM(s) IV Push three times a day PRN generalized tonic-clonic seizure lasting longer than 2 minutes, or two consecutive seizures without return to baseline in-between  melatonin 3 milliGRAM(s) Oral at bedtime PRN Insomnia  ondansetron Injectable 4 milliGRAM(s) IV Push every 8 hours PRN Nausea and/or Vomiting      ANTIBIOTICS:      All available historical data has been reviewed    ASSESSMENT  54y F admitted with Other seizure        PROBLEMS  
    TED ROSE  54y, Female  Allergy: No Known Allergies      CHIEF COMPLAINT: VEEG (18 Dec 2024 12:04)      HPI:  53yo F current smoker with PMHx seizure disorder (on kera, Dr. Felder), hyperthyroidism, anxiety here for Veeg admission as rec'ed by her outpt neurologist. Denies fever, chills, recent illnesses, V/N/D, abdominal pain. Patient reports last seizure reported December 9th.  (17 Dec 2024 10:50)    HPI:    FAMILY HISTORY:    PAST MEDICAL & SURGICAL HISTORY:  History of epilepsy      Pulmonary nodule      Cervical herniated disc      Pancreatic enlargement  tail of pancreas enlarged/inflammed      Hyperthyroidism      H/O bilateral salpingectomy      H/O unilateral oophorectomy          SOCIAL HISTORY  Social History:  pt reports she currently smokes ( less than 1 pack per day)   denies alcohol and illicit drug use (17 Dec 2024 10:50)      Home Medications:  levETIRAcetam 750 mg oral tablet, extended release: 2 tab(s) orally once a day (at bedtime) (17 Dec 2024 11:29)  LEVETIRACETAM  MG TABLET: 1 tab(s) orally once a day (in the morning) once a day in the morning (17 Dec 2024 11:27)  Lexapro 10 mg oral tablet: 1 tab(s) orally once a day (17 Dec 2024 11:30)  methIMAzole 5 mg oral tablet: 2 tab(s) orally once a day (17 Dec 2024 11:28)      ROS  General: Denies fevers, chills, nightsweats, weight loss  HEENT: Denies headache, rhinorrhea, sore throat, eye pain  CV: Denies CP, palpitations  PULM: Denies SOB, cough  GI: Denies abdominal pain, diarrhea  : Denies dysuria, hematuria  MSK: Denies arthralgias  SKIN: Denies rash   NEURO: Denies paresthesias, weakness  PSYCH: Denies depression    ICU Vital Signs Last 24 Hrs  T(C): 36.6 (19 Dec 2024 04:30), Max: 36.8 (18 Dec 2024 20:46)  T(F): 97.8 (19 Dec 2024 04:30), Max: 98.3 (18 Dec 2024 20:46)  HR: 57 (19 Dec 2024 04:30) (57 - 75)  BP: 109/72 (19 Dec 2024 04:30) (109/72 - 121/65)  BP(mean): --  ABP: --  ABP(mean): --  RR: 18 (19 Dec 2024 04:30) (18 - 18)  SpO2: 96% (19 Dec 2024 04:30) (96% - 96%)    O2 Parameters below as of 19 Dec 2024 04:30  Patient On (Oxygen Delivery Method): room air        air        PHYSICAL EXAM:  Gen: NAD, resting in bed  HEENT: Normocephalic, atraumatic  Neck: supple, no lymphadenopathy  CV: Regular rate & regular rhythm  Lungs: CTABL no wheeze  Abdomen: Soft, NTND+ BS present  Ext: Warm, well perfused no CCE  Neuro: non focal, awake, CN II-XII intact   Skin: no rash, no erythema  Psych: no SI, HI, Hallucination     TESTS & MEASUREMENTS:                        13.5   9.16  )-----------( 241      ( 17 Dec 2024 19:23 )             40.3     12-17    139  |  104  |  22[H]  ----------------------------<  86  4.3   |  26  |  0.9    Ca    8.8      17 Dec 2024 19:23  Mg     2.4     12-17    TPro  6.2  /  Alb  3.9  /  TBili  <0.2  /  DBili  x   /  AST  23  /  ALT  31  /  AlkPhos  160[H]  12-17      LIVER FUNCTIONS - ( 17 Dec 2024 19:23 )  Alb: 3.9 g/dL / Pro: 6.2 g/dL / ALK PHOS: 160 U/L / ALT: 31 U/L / AST: 23 U/L / GGT: x           Urinalysis Basic - ( 17 Dec 2024 19:23 )    Color: x / Appearance: x / SG: x / pH: x  Gluc: 86 mg/dL / Ketone: x  / Bili: x / Urobili: x   Blood: x / Protein: x / Nitrite: x   Leuk Esterase: x / RBC: x / WBC x   Sq Epi: x / Non Sq Epi: x / Bacteria: x            QRS axis to [] ° and NSR at a rate of [] BPM. There was no atrial enlargement. There was no ventricular hypertrophy. There were no ST-T changes and all intervals were normal.      INFECTIOUS DISEASES TESTING      RADIOLOGY & ADDITIONAL TESTS:  I have personally reviewed the last Chest xray  CXR      CT      CARDIOLOGY TESTING  12 Lead ECG:   Ventricular Rate 65 BPM    Atrial Rate 65 BPM    P-R Interval 160 ms    QRS Duration 82 ms    Q-T Interval 450 ms    QTC Calculation(Bazett) 468 ms    P Axis 74 degrees    R Axis 63 degrees    T Axis 56 degrees    Diagnosis Line Normal sinus rhythm  Possible Anterior infarct , age undetermined  Abnormal ECG    Confirmed by Fabien Sosa ([id number]) on 12/18/2024 1:17:32 PM (12-18-24 @ 12:01)  12 Lead ECG:   Ventricular Rate 64 BPM    Atrial Rate 64 BPM    P-R Interval 164 ms    QRS Duration 84 ms    Q-T Interval 436 ms    QTC Calculation(Bazett) 449 ms    P Axis 82 degrees    R Axis 43 degrees    T Axis 36 degrees    Diagnosis Line Normal sinus rhythm  Normal ECG    Confirmed by MINH SMITH MD ([id number]) on 12/10/2024 10:04:48 AM (12-09-24 @ 12:03)      MEDICATIONS  (STANDING):  escitalopram 10 milliGRAM(s) Oral daily  levETIRAcetam 750 milliGRAM(s) Oral every 12 hours  methimazole 10 milliGRAM(s) Oral daily  nicotine -  14 mG/24Hr(s) Patch 1 Patch Transdermal daily  pyridoxine 100 milliGRAM(s) Oral every 24 hours    MEDICATIONS  (PRN):  acetaminophen     Tablet .. 650 milliGRAM(s) Oral every 6 hours PRN Temp greater or equal to 38C (100.4F), Mild Pain (1 - 3)  aluminum hydroxide/magnesium hydroxide/simethicone Suspension 30 milliLiter(s) Oral every 4 hours PRN Dyspepsia  LORazepam   Injectable 2 milliGRAM(s) IV Push three times a day PRN generalized tonic-clonic seizure lasting longer than 2 minutes, or two consecutive seizures without return to baseline in-between  melatonin 3 milliGRAM(s) Oral at bedtime PRN Insomnia  ondansetron Injectable 4 milliGRAM(s) IV Push every 8 hours PRN Nausea and/or Vomiting      ANTIBIOTICS:      All available historical data has been reviewed    ASSESSMENT  54y F admitted with Other seizure        PROBLEMS

## 2024-12-20 NOTE — DISCHARGE NOTE PROVIDER - NSDCCPCAREPLAN_GEN_ALL_CORE_FT
PRINCIPAL DISCHARGE DIAGNOSIS  Diagnosis: EP (epilepsy)  Assessment and Plan of Treatment: you underwent video eeg with adjustment in medications - take as prescribed . Follow up with Neurology if any new symptoms or side effects from medication      SECONDARY DISCHARGE DIAGNOSES  Diagnosis: Hyperthyroidism  Assessment and Plan of Treatment: follow up with endocrine for further testing - thyroid function tests here show TSH 1.69, serum t4 7.4, total T3 115  MCOT - heart monitor was placed to rule out arrythmia - follow up with EPS doctor for results on monitoring

## 2024-12-20 NOTE — DISCHARGE NOTE NURSING/CASE MANAGEMENT/SOCIAL WORK - FINANCIAL ASSISTANCE
[facility] provides services at a reduced cost to those who are determined to be eligible through [facility]’s financial assistance program. Information regarding [facility]’s financial assistance program can be found by going to [email or web address] or by calling [phone].

## 2024-12-20 NOTE — DISCHARGE NOTE PROVIDER - HOSPITAL COURSE
Patient is a 54y old right-handed Female who presents for elective VEEG        HPI: History obtained from patient. Outpatient records reviewed.  Patient was diagnosed with epilepsy in  when she had first witnessed seizure described as GTC event. She was started on Dilantin and remained clinically seizure-free until 2023. At some point prior to that patient was also prescribed Zonisamide, and it is unclear when and way it was stopped. She is followed as outpatient by Dr. Felder.  In 2023 patient had a cluster of 3 seizures while recovering from pneumonia. Her Dilantin level on admission was only 3.4.   After hospital discharge patient was started on Keppra and Dilantin taper initiated.   She had no other clinical events until summer 2024 when had several unwitnessed events described as feeling strange/needing to sit down, followed by confusion. During one of the episodes in August she recalls feeling her head and arm shaking. By then patient was off Dilantin, Keppra dose was increased.  2024 patient was taken from work to [facility] ED. Patient recalls sitting in a classroom alone feeling intense hot flash that started at the waste and went up. Then she woke up on the floor with posterior head pain/laceration feeling confused. Her imaging was reportedly normal, not admitted. As outpatient Keppra dose was increased and switched to ER formulation.   Patient was OK until  when had another event at work that was recorded by security camera. It started again with hot flash, then patient feel backwards and remained unresponsive for about 1 minute followed by confusion. She was seen in the ED, required staples, + right tongue bite.   Patient reports having perimenopausal symptoms for the last 18 months with very irregular menses (LMP 10/24/2024, prior 2024, 2024). She experiences occasional hot flashes that are different and less intense than the feeling she had prior to the last 2 events.   She reports being compliant with Keppra, no missed doses, tolerating well. Of note, patient was diagnosed with hyperthyroidism and started medications for it in the Fall of .  PAST MEDICAL & SURGICAL HISTORY:  Epilepsy  Cervical herniated disc  Thyroid nodules  Hyperthyroidism  Ovarian cysts  H/o bilateral salpingectomy  H/o R oophorectomyVEEG in the last 24 hours:    Background - continuous, symmetrical, well organized, reaching frequencies in the range of 8-9 Hz, showing good reactivity with higher amplitude from the left side    Focal and generalized slowin. no generalized slowing  2. mild to moderate left hemispheric mainly centrotemporal focal slowing, more prominently seen during drowsiness    Interictal activity:  1. occasional left midtemporal sharp transients and rare sharp waves that appear epileptiform  2. sharply contoured or notched theta over left TC region    Events - none    Seizures - none    Impression: Abnormal VEEG as above. Level sent on admission still pending. MCOT monitor in place per cardiology.    Plan -   Will discontinue monitoring   Give Keppra 750mg x 1 now, then 500mg x 1 prior to discharge  Discharge on Keppra ER 1000mg AM and 1500mg at bedtime  Follow up as scheduled with level  Follow up with EP.#hyperthyroid - on methimazole - repeat TFT are WNL   , needs outpt endocrine eval, uptake scan, ekg done today - wnl , single lead twi reviewed myself   asymptomatic , MCOT placed to rule out arrythmia -  follow up with Dr Bucio - EPS    VEEG monitoring completed, patient is cleared for discharge from neurology standpoint.    Follow up neurology appointment is scheduled with Dr. Felder   for 2025 at 3:30 pm.    [address] [place] [address], [address]  [phone]    Discharge seizure medications are:  Keppra ER 1000mg in the morning & 1500mg at bedtime (dose increased)    Rx sent to the pharmacy.    Continue Vitamin B6 100mg daily.    Patient was also given Rx for CBC, CMP, Keppra level trough to be done prior to follow up.    Follow up with EP as scheduled in 2025.

## 2024-12-21 NOTE — EEG REPORT - NS EEG TEXT BOX
TED ROSE [id number]     Study Date: 12-20-24 08:10-10:42  Duration: 2 hr 32 min  --------------------------------------------------------------------------------------------------  History:  CC/ HPI Patient is a 54y old  Female who presents with a chief complaint of VEEG (20 Dec 2024 11:53)    MEDICATIONS  (STANDING):    --------------------------------------------------------------------------------------------------  Study Interpretation:    [[[Abbreviation Key:  PDR=alpha rhythm/posterior dominant rhythm. A-P=anterior posterior.  Amplitude: ‘very low’:<20; ‘low’:20-49; ‘medium’:; ‘high’:>150uV.  Persistence for periodic/rhythmic patterns (% of epoch) ‘rare’:<1%; ‘occasional’:1-10%; ‘frequent’:10-50%; ‘abundant’:50-90%; ‘continuous’:>90%.  Persistence for sporadic discharges: ‘rare’:<1/hr; ‘occasional’:1/min-1/hr; ‘frequent’:>1/min; ‘abundant’:>1/10 sec.  RPP=rhythmic and periodic patterns; GRDA=generalized rhythmic delta activity; FIRDA=frontal intermittent GRDA; LRDA=lateralized rhythmic delta activity; TIRDA=temporal intermittent rhythmic delta activity;  LPD=PLED=lateralized periodic discharges; GPD=generalized periodic discharges; BIPDs =bilateral independent periodic discharges; Mf=multifocal; SIRPDs=stimulus induced rhythmic, periodic, or ictal appearing discharges; BIRDs=brief potentially ictal rhythmic discharges >4 Hz, lasting .5-10s; PFA (paroxysmal bursts >13 Hz or =8 Hz <10s).  Modifiers: +F=with fast component; +S=with spike component; +R=with rhythmic component.  S-B=burst suppression pattern.  Max=maximal. N1-drowsy; N2-stage II sleep; N3-slow wave sleep. SSS/BETS=small sharp spikes/benign epileptiform transients of sleep. HV=hyperventilation; PS=photic stimulation]]]    Daily EEG Visual Analysis    FINDINGS:      Background:  Continuity: Continuous  Symmetry: Asymmetric  Posterior dominant rhythm (PDR): 9-9.5 Hz, reactive to eye closure. Symmetric low-amplitude frontal beta in wakefulness.  Voltage: Normal  Anterior-Posterior Gradient: Present  Other background findings: Occasional runs of frontocentrally predominant theta activity (normal finding)  Breach: Absent    Background Slowing:  Generalized slowing: None  Focal slowing: Abundant left hemispheric centrotemporally predominant sharply contoured theta activity with intermittent higher amplitudes compared to the right    State Changes:   Drowsiness is characterized by fragmentation, attenuation, and slowing of the background activity.  Stage 2 sleep is characterized by sleep spindles that are of higher amplitude on the left.    Interictal Findings:  Frequent left frontotemporal (F7/T7) spikes.  Frequent left temporal (T7) and centrotemporal (T7/C3) spikes.    Electrographic and Electroclinical seizures:  None    Other Clinical Events:  None    Activation Procedures:   Hyperventilation is not performed.    Photic stimulation is not performed.    Artifacts:  Intermittent myogenic and movement artifacts are present.    Single-lead EKG: Regular rhythm    EEG Classification / Summary:  Abnormal EEG in the awake, drowsy, and asleep states.   -Frequent left frontotemporal, left temporal, and left centrotemporal spikes  -Abundant left hemispheric centrotemporally predominant sharply contoured and higher-amplitude focal slowing.  -No seizures.    Clinical Impression:  -Risk of focal-onset seizures from the left frontotemporal, left temporal, and left centrotemporal regions  -Left hemispheric centrotemporally predominant focal cerebral dysfunction can be structural or functional in etiology.  -No seizures captured.        -------------------------------------------------------------------------------------------------------    Albina Darling MD  Attending Physician, [facility] [place]    -------------------------------------------------------------------------------------------------------    To reach EEG technologist:  Please use the pager number for the appropriate hospital or contact the .  At [facility] - Pager #: [phone]    To reach EEG-reading physician:  EEG Reading Room Phone #: [phone]  Epilepsy Answering Service after 5PM and before 8:30AM: Phone #: [phone]

## 2024-12-27 DIAGNOSIS — F17.200 NICOTINE DEPENDENCE, UNSPECIFIED, UNCOMPLICATED: ICD-10-CM

## 2024-12-27 DIAGNOSIS — F41.9 ANXIETY DISORDER, UNSPECIFIED: ICD-10-CM

## 2024-12-27 DIAGNOSIS — Z90.722 ACQUIRED ABSENCE OF OVARIES, BILATERAL: ICD-10-CM

## 2024-12-27 DIAGNOSIS — E05.90 THYROTOXICOSIS, UNSPECIFIED WITHOUT THYROTOXIC CRISIS OR STORM: ICD-10-CM

## 2024-12-27 DIAGNOSIS — G40.909 EPILEPSY, UNSPECIFIED, NOT INTRACTABLE, WITHOUT STATUS EPILEPTICUS: ICD-10-CM

## 2025-01-10 RX ORDER — LACOSAMIDE 50 MG/1
50 TABLET ORAL TWICE DAILY
Qty: 60 | Refills: 0 | Status: ACTIVE | COMMUNITY
Start: 2025-01-10 | End: 1900-01-01

## 2025-01-15 ENCOUNTER — APPOINTMENT (OUTPATIENT)
Dept: ELECTROPHYSIOLOGY | Facility: CLINIC | Age: 55
End: 2025-01-15

## 2025-01-23 ENCOUNTER — APPOINTMENT (OUTPATIENT)
Dept: ELECTROPHYSIOLOGY | Facility: CLINIC | Age: 55
End: 2025-01-23
Payer: COMMERCIAL

## 2025-01-23 ENCOUNTER — NON-APPOINTMENT (OUTPATIENT)
Age: 55
End: 2025-01-23

## 2025-01-23 VITALS
BODY MASS INDEX: 36.7 KG/M2 | WEIGHT: 215 LBS | HEART RATE: 67 BPM | DIASTOLIC BLOOD PRESSURE: 88 MMHG | HEIGHT: 64 IN | SYSTOLIC BLOOD PRESSURE: 140 MMHG

## 2025-01-23 DIAGNOSIS — R55 SYNCOPE AND COLLAPSE: ICD-10-CM

## 2025-01-23 DIAGNOSIS — G40.909 EPILEPSY, UNSPECIFIED, NOT INTRACTABLE, W/OUT STATUS EPILEPTICUS: ICD-10-CM

## 2025-01-23 PROCEDURE — G2211 COMPLEX E/M VISIT ADD ON: CPT | Mod: NC

## 2025-01-23 PROCEDURE — 93000 ELECTROCARDIOGRAM COMPLETE: CPT | Mod: 59

## 2025-01-23 PROCEDURE — 99205 OFFICE O/P NEW HI 60 MIN: CPT

## 2025-01-23 RX ORDER — LEVETIRACETAM 750 MG/1
TABLET, EXTENDED RELEASE ORAL AT BEDTIME
Refills: 0 | Status: ACTIVE | COMMUNITY

## 2025-01-23 RX ORDER — LEVETIRACETAM 500 MG/1
500 TABLET, FILM COATED, EXTENDED RELEASE ORAL EVERY MORNING
Refills: 0 | Status: ACTIVE | COMMUNITY

## 2025-02-12 ENCOUNTER — APPOINTMENT (OUTPATIENT)
Dept: ENDOCRINOLOGY | Facility: CLINIC | Age: 55
End: 2025-02-12
Payer: COMMERCIAL

## 2025-02-12 VITALS
OXYGEN SATURATION: 99 % | WEIGHT: 220 LBS | HEART RATE: 91 BPM | SYSTOLIC BLOOD PRESSURE: 110 MMHG | HEIGHT: 64 IN | BODY MASS INDEX: 37.56 KG/M2 | DIASTOLIC BLOOD PRESSURE: 72 MMHG

## 2025-02-12 DIAGNOSIS — E05.00 THYROTOXICOSIS WITH DIFFUSE GOITER W/OUT THYROTOXIC CRISIS OR STORM: ICD-10-CM

## 2025-02-12 DIAGNOSIS — D35.00 BENIGN NEOPLASM OF UNSPECIFIED ADRENAL GLAND: ICD-10-CM

## 2025-02-12 DIAGNOSIS — E04.2 NONTOXIC MULTINODULAR GOITER: ICD-10-CM

## 2025-02-12 DIAGNOSIS — E05.90 THYROTOXICOSIS, UNSPECIFIED W/OUT THYROTOXIC CRISIS OR STORM: ICD-10-CM

## 2025-02-12 PROCEDURE — 99214 OFFICE O/P EST MOD 30 MIN: CPT

## 2025-02-18 ENCOUNTER — OUTPATIENT (OUTPATIENT)
Dept: OUTPATIENT SERVICES | Facility: HOSPITAL | Age: 55
LOS: 1 days | Discharge: HOME | End: 2025-02-18
Payer: COMMERCIAL

## 2025-02-18 VITALS — OXYGEN SATURATION: 96 % | SYSTOLIC BLOOD PRESSURE: 132 MMHG | DIASTOLIC BLOOD PRESSURE: 76 MMHG | HEART RATE: 65 BPM

## 2025-02-18 DIAGNOSIS — Z90.79 ACQUIRED ABSENCE OF OTHER GENITAL ORGAN(S): Chronic | ICD-10-CM

## 2025-02-18 DIAGNOSIS — R55 SYNCOPE AND COLLAPSE: ICD-10-CM

## 2025-02-18 DIAGNOSIS — Z90.721 ACQUIRED ABSENCE OF OVARIES, UNILATERAL: Chronic | ICD-10-CM

## 2025-02-18 PROCEDURE — C1764: CPT

## 2025-02-18 PROCEDURE — 33285 INSJ SUBQ CAR RHYTHM MNTR: CPT

## 2025-02-18 RX ORDER — CEPHALEXIN 500 MG
500 CAPSULE ORAL ONCE
Refills: 0 | Status: COMPLETED | OUTPATIENT
Start: 2025-02-18 | End: 2025-02-18

## 2025-02-18 RX ORDER — LACOSAMIDE 200 MG/1
0.5 TABLET, FILM COATED ORAL
Refills: 0 | DISCHARGE

## 2025-02-18 RX ADMIN — Medication 500 MILLIGRAM(S): at 09:06

## 2025-02-18 NOTE — CHART NOTE - NSCHARTNOTEFT_GEN_A_CORE
Electrophysiology Brief Post-Op Note    I have personally seen and examined the patient.  I agree with the history and physical which I have reviewed and noted any changes below.  02-18-25 @ 10:14    PRE-OP DIAGNOSIS:  Syncope     POST-OP DIAGNOSIS: Syncope     PROCEDURE: Loop Implant    Physician: Dr. Haney  Assistant: SHEYLA Barnhart    ESTIMATED BLOOD LOSS:  2  mL    ANESTHESIA TYPE:  [  ]General Anesthesia  [  ] Sedation  [X  ] Local/Regional    CONDITION  [  ] Critical  [  ] Serious  [  ]Fair  [ X ]Good    SPECIMENS REMOVED (IF APPLICABLE):  none    IMPLANTS (IF APPLICABLE)  Loop Recorder ([employer]) [id number]  R wave ampliitude 0.15mV    FINDINGS  PLAN OF CARE  - F/U 3-4 weeks  - May remove bandaid in 2 days  - May shower in 48 hours

## 2025-02-18 NOTE — H&P CARDIOLOGY - HISTORY OF PRESENT ILLNESS
53yo F current smoker with PMHx seizure disorder (on silvestre, Dr. Felder), hyperthyroidism, anxiety, obesity, COPD, Adrenal adenoma reports episodes of recurrent syncope, pt here for ILR implant.   53yo F current smoker with PMHx seizure disorder (on silvestre, Dr. Felder), hyperthyroidism, anxiety, obesity, COPD, Adrenal adenoma reports episodes of recurrent syncope every 4-6 weeks, pt here for ILR implant.

## 2025-02-19 ENCOUNTER — NON-APPOINTMENT (OUTPATIENT)
Age: 55
End: 2025-02-19

## 2025-02-19 ENCOUNTER — APPOINTMENT (OUTPATIENT)
Dept: NEUROLOGY | Facility: CLINIC | Age: 55
End: 2025-02-19
Payer: COMMERCIAL

## 2025-02-19 VITALS
OXYGEN SATURATION: 96 % | WEIGHT: 220 LBS | BODY MASS INDEX: 37.56 KG/M2 | HEART RATE: 75 BPM | HEIGHT: 64 IN | RESPIRATION RATE: 18 BRPM | DIASTOLIC BLOOD PRESSURE: 92 MMHG | SYSTOLIC BLOOD PRESSURE: 141 MMHG

## 2025-02-19 DIAGNOSIS — G40.909 EPILEPSY, UNSPECIFIED, NOT INTRACTABLE, W/OUT STATUS EPILEPTICUS: ICD-10-CM

## 2025-02-19 DIAGNOSIS — R55 SYNCOPE AND COLLAPSE: ICD-10-CM

## 2025-02-19 PROCEDURE — G2211 COMPLEX E/M VISIT ADD ON: CPT | Mod: NC

## 2025-02-19 PROCEDURE — 99213 OFFICE O/P EST LOW 20 MIN: CPT

## 2025-03-31 ENCOUNTER — APPOINTMENT (OUTPATIENT)
Dept: ELECTROPHYSIOLOGY | Facility: CLINIC | Age: 55
End: 2025-03-31
Payer: COMMERCIAL

## 2025-03-31 VITALS
BODY MASS INDEX: 37.56 KG/M2 | WEIGHT: 220 LBS | HEIGHT: 64 IN | DIASTOLIC BLOOD PRESSURE: 80 MMHG | SYSTOLIC BLOOD PRESSURE: 130 MMHG | HEART RATE: 70 BPM

## 2025-03-31 DIAGNOSIS — Z45.09 ENCOUNTER FOR ADJUSTMENT AND MANAGEMENT OF OTHER CARDIAC DEVICE: ICD-10-CM

## 2025-03-31 DIAGNOSIS — R55 SYNCOPE AND COLLAPSE: ICD-10-CM

## 2025-03-31 DIAGNOSIS — Z48.89 ENCOUNTER FOR OTHER SPECIFIED SURGICAL AFTERCARE: ICD-10-CM

## 2025-03-31 DIAGNOSIS — G40.909 EPILEPSY, UNSPECIFIED, NOT INTRACTABLE, W/OUT STATUS EPILEPTICUS: ICD-10-CM

## 2025-03-31 PROCEDURE — 93291 INTERROG DEV EVAL SCRMS IP: CPT

## 2025-03-31 PROCEDURE — 99213 OFFICE O/P EST LOW 20 MIN: CPT | Mod: 25

## 2025-03-31 PROCEDURE — 99024 POSTOP FOLLOW-UP VISIT: CPT

## 2025-04-16 ENCOUNTER — NON-APPOINTMENT (OUTPATIENT)
Age: 55
End: 2025-04-16

## 2025-04-16 ENCOUNTER — APPOINTMENT (OUTPATIENT)
Dept: NEUROLOGY | Facility: CLINIC | Age: 55
End: 2025-04-16
Payer: COMMERCIAL

## 2025-04-16 VITALS
HEART RATE: 83 BPM | WEIGHT: 230 LBS | OXYGEN SATURATION: 99 % | RESPIRATION RATE: 20 BRPM | DIASTOLIC BLOOD PRESSURE: 92 MMHG | SYSTOLIC BLOOD PRESSURE: 145 MMHG | HEIGHT: 64 IN | BODY MASS INDEX: 39.27 KG/M2

## 2025-04-16 DIAGNOSIS — S16.1XXA STRAIN OF MUSCLE, FASCIA AND TENDON AT NECK LEVEL, INITIAL ENCOUNTER: ICD-10-CM

## 2025-04-16 DIAGNOSIS — G40.909 EPILEPSY, UNSPECIFIED, NOT INTRACTABLE, W/OUT STATUS EPILEPTICUS: ICD-10-CM

## 2025-04-16 DIAGNOSIS — R55 SYNCOPE AND COLLAPSE: ICD-10-CM

## 2025-04-16 PROCEDURE — 99213 OFFICE O/P EST LOW 20 MIN: CPT

## 2025-04-17 ENCOUNTER — APPOINTMENT (OUTPATIENT)
Dept: ELECTROPHYSIOLOGY | Facility: CLINIC | Age: 55
End: 2025-04-17
Payer: COMMERCIAL

## 2025-04-17 VITALS
HEIGHT: 64 IN | HEART RATE: 104 BPM | DIASTOLIC BLOOD PRESSURE: 86 MMHG | SYSTOLIC BLOOD PRESSURE: 126 MMHG | BODY MASS INDEX: 38.07 KG/M2 | WEIGHT: 223 LBS

## 2025-04-17 DIAGNOSIS — I45.5 OTHER SPECIFIED HEART BLOCK: ICD-10-CM

## 2025-04-17 PROCEDURE — 99215 OFFICE O/P EST HI 40 MIN: CPT | Mod: 25

## 2025-04-17 PROCEDURE — 93000 ELECTROCARDIOGRAM COMPLETE: CPT | Mod: 59

## 2025-04-21 PROBLEM — I45.5 SINUS PAUSE: Status: ACTIVE | Noted: 2025-04-21

## 2025-04-23 ENCOUNTER — RX RENEWAL (OUTPATIENT)
Age: 55
End: 2025-04-23

## 2025-05-06 ENCOUNTER — INPATIENT (INPATIENT)
Facility: HOSPITAL | Age: 55
LOS: 3 days | Discharge: HOME | DRG: 229 | End: 2025-05-10
Attending: INTERNAL MEDICINE | Admitting: INTERNAL MEDICINE
Payer: COMMERCIAL

## 2025-05-06 VITALS
HEART RATE: 75 BPM | TEMPERATURE: 99 F | DIASTOLIC BLOOD PRESSURE: 92 MMHG | RESPIRATION RATE: 18 BRPM | WEIGHT: 226.19 LBS | OXYGEN SATURATION: 95 % | SYSTOLIC BLOOD PRESSURE: 147 MMHG

## 2025-05-06 DIAGNOSIS — Z90.79 ACQUIRED ABSENCE OF OTHER GENITAL ORGAN(S): Chronic | ICD-10-CM

## 2025-05-06 DIAGNOSIS — Z90.721 ACQUIRED ABSENCE OF OVARIES, UNILATERAL: Chronic | ICD-10-CM

## 2025-05-06 DIAGNOSIS — I45.5 OTHER SPECIFIED HEART BLOCK: ICD-10-CM

## 2025-05-06 LAB
ALBUMIN SERPL ELPH-MCNC: 4.4 G/DL — SIGNIFICANT CHANGE UP (ref 3.5–5.2)
ALP SERPL-CCNC: 211 U/L — HIGH (ref 30–115)
ALT FLD-CCNC: 33 U/L — SIGNIFICANT CHANGE UP (ref 0–41)
ANION GAP SERPL CALC-SCNC: 9 MMOL/L — SIGNIFICANT CHANGE UP (ref 7–14)
APTT BLD: 29.9 SEC — SIGNIFICANT CHANGE UP (ref 27–39.2)
AST SERPL-CCNC: 32 U/L — SIGNIFICANT CHANGE UP (ref 0–41)
BASOPHILS # BLD AUTO: 0.07 K/UL — SIGNIFICANT CHANGE UP (ref 0–0.2)
BASOPHILS NFR BLD AUTO: 0.8 % — SIGNIFICANT CHANGE UP (ref 0–1)
BILIRUB SERPL-MCNC: <0.2 MG/DL — SIGNIFICANT CHANGE UP (ref 0.2–1.2)
BLD GP AB SCN SERPL QL: SIGNIFICANT CHANGE UP
BUN SERPL-MCNC: 15 MG/DL — SIGNIFICANT CHANGE UP (ref 10–20)
CALCIUM SERPL-MCNC: 9.6 MG/DL — SIGNIFICANT CHANGE UP (ref 8.4–10.5)
CHLORIDE SERPL-SCNC: 104 MMOL/L — SIGNIFICANT CHANGE UP (ref 98–110)
CO2 SERPL-SCNC: 24 MMOL/L — SIGNIFICANT CHANGE UP (ref 17–32)
CREAT SERPL-MCNC: 0.8 MG/DL — SIGNIFICANT CHANGE UP (ref 0.7–1.5)
EGFR: 88 ML/MIN/1.73M2 — SIGNIFICANT CHANGE UP
EGFR: 88 ML/MIN/1.73M2 — SIGNIFICANT CHANGE UP
EOSINOPHIL # BLD AUTO: 0.15 K/UL — SIGNIFICANT CHANGE UP (ref 0–0.7)
EOSINOPHIL NFR BLD AUTO: 1.7 % — SIGNIFICANT CHANGE UP (ref 0–8)
GLUCOSE SERPL-MCNC: 92 MG/DL — SIGNIFICANT CHANGE UP (ref 70–99)
HCT VFR BLD CALC: 44.2 % — SIGNIFICANT CHANGE UP (ref 37–47)
HGB BLD-MCNC: 15.1 G/DL — SIGNIFICANT CHANGE UP (ref 12–16)
IMM GRANULOCYTES NFR BLD AUTO: 0.3 % — SIGNIFICANT CHANGE UP (ref 0.1–0.3)
INR BLD: 0.83 RATIO — SIGNIFICANT CHANGE UP (ref 0.65–1.3)
LYMPHOCYTES # BLD AUTO: 2.66 K/UL — SIGNIFICANT CHANGE UP (ref 1.2–3.4)
LYMPHOCYTES # BLD AUTO: 29.9 % — SIGNIFICANT CHANGE UP (ref 20.5–51.1)
MAGNESIUM SERPL-MCNC: 2.6 MG/DL — HIGH (ref 1.8–2.4)
MCHC RBC-ENTMCNC: 30.6 PG — SIGNIFICANT CHANGE UP (ref 27–31)
MCHC RBC-ENTMCNC: 34.2 G/DL — SIGNIFICANT CHANGE UP (ref 32–37)
MCV RBC AUTO: 89.5 FL — SIGNIFICANT CHANGE UP (ref 81–99)
MONOCYTES # BLD AUTO: 0.49 K/UL — SIGNIFICANT CHANGE UP (ref 0.1–0.6)
MONOCYTES NFR BLD AUTO: 5.5 % — SIGNIFICANT CHANGE UP (ref 1.7–9.3)
NEUTROPHILS # BLD AUTO: 5.51 K/UL — SIGNIFICANT CHANGE UP (ref 1.4–6.5)
NEUTROPHILS NFR BLD AUTO: 61.8 % — SIGNIFICANT CHANGE UP (ref 42.2–75.2)
NRBC BLD AUTO-RTO: 0 /100 WBCS — SIGNIFICANT CHANGE UP (ref 0–0)
PLATELET # BLD AUTO: 274 K/UL — SIGNIFICANT CHANGE UP (ref 130–400)
PMV BLD: 10.9 FL — HIGH (ref 7.4–10.4)
POTASSIUM SERPL-MCNC: 4.9 MMOL/L — SIGNIFICANT CHANGE UP (ref 3.5–5)
POTASSIUM SERPL-SCNC: 4.9 MMOL/L — SIGNIFICANT CHANGE UP (ref 3.5–5)
PROT SERPL-MCNC: 7.1 G/DL — SIGNIFICANT CHANGE UP (ref 6–8)
PROTHROM AB SERPL-ACNC: 9.8 SEC — LOW (ref 9.95–12.87)
RBC # BLD: 4.94 M/UL — SIGNIFICANT CHANGE UP (ref 4.2–5.4)
RBC # FLD: 12.9 % — SIGNIFICANT CHANGE UP (ref 11.5–14.5)
SODIUM SERPL-SCNC: 137 MMOL/L — SIGNIFICANT CHANGE UP (ref 135–146)
WBC # BLD: 8.91 K/UL — SIGNIFICANT CHANGE UP (ref 4.8–10.8)
WBC # FLD AUTO: 8.91 K/UL — SIGNIFICANT CHANGE UP (ref 4.8–10.8)

## 2025-05-06 PROCEDURE — 36415 COLL VENOUS BLD VENIPUNCTURE: CPT

## 2025-05-06 PROCEDURE — 80053 COMPREHEN METABOLIC PANEL: CPT

## 2025-05-06 PROCEDURE — C1605: CPT

## 2025-05-06 PROCEDURE — 84439 ASSAY OF FREE THYROXINE: CPT

## 2025-05-06 PROCEDURE — C1760: CPT

## 2025-05-06 PROCEDURE — 0241U: CPT

## 2025-05-06 PROCEDURE — 86900 BLOOD TYPING SEROLOGIC ABO: CPT

## 2025-05-06 PROCEDURE — 33274 TCAT INSJ/RPL PERM LDLS PM: CPT | Mod: Q0

## 2025-05-06 PROCEDURE — 84443 ASSAY THYROID STIM HORMONE: CPT

## 2025-05-06 PROCEDURE — 99222 1ST HOSP IP/OBS MODERATE 55: CPT

## 2025-05-06 PROCEDURE — 93010 ELECTROCARDIOGRAM REPORT: CPT

## 2025-05-06 PROCEDURE — C1887: CPT

## 2025-05-06 PROCEDURE — 99223 1ST HOSP IP/OBS HIGH 75: CPT | Mod: 57

## 2025-05-06 PROCEDURE — 83735 ASSAY OF MAGNESIUM: CPT

## 2025-05-06 PROCEDURE — 93286 PERI-PX EVAL PM/LDLS PM IP: CPT

## 2025-05-06 PROCEDURE — 86850 RBC ANTIBODY SCREEN: CPT

## 2025-05-06 PROCEDURE — 86901 BLOOD TYPING SEROLOGIC RH(D): CPT

## 2025-05-06 PROCEDURE — 71046 X-RAY EXAM CHEST 2 VIEWS: CPT | Mod: 26

## 2025-05-06 PROCEDURE — 71045 X-RAY EXAM CHEST 1 VIEW: CPT

## 2025-05-06 PROCEDURE — 85025 COMPLETE CBC W/AUTO DIFF WBC: CPT

## 2025-05-06 PROCEDURE — 99406 BEHAV CHNG SMOKING 3-10 MIN: CPT

## 2025-05-06 PROCEDURE — 99285 EMERGENCY DEPT VISIT HI MDM: CPT

## 2025-05-06 PROCEDURE — 71046 X-RAY EXAM CHEST 2 VIEWS: CPT

## 2025-05-06 PROCEDURE — C1769: CPT

## 2025-05-06 PROCEDURE — 85730 THROMBOPLASTIN TIME PARTIAL: CPT

## 2025-05-06 PROCEDURE — 85610 PROTHROMBIN TIME: CPT

## 2025-05-06 PROCEDURE — 84100 ASSAY OF PHOSPHORUS: CPT

## 2025-05-06 PROCEDURE — C1894: CPT

## 2025-05-06 PROCEDURE — 93307 TTE W/O DOPPLER COMPLETE: CPT

## 2025-05-06 RX ORDER — MELATONIN 5 MG
3 TABLET ORAL AT BEDTIME
Refills: 0 | Status: DISCONTINUED | OUTPATIENT
Start: 2025-05-06 | End: 2025-05-10

## 2025-05-06 RX ORDER — LACOSAMIDE 150 MG/1
50 TABLET, FILM COATED ORAL
Refills: 0 | Status: DISCONTINUED | OUTPATIENT
Start: 2025-05-06 | End: 2025-05-07

## 2025-05-06 RX ORDER — PYRIDOXINE HCL (VITAMIN B6) 25 MG
100 TABLET ORAL EVERY 24 HOURS
Refills: 0 | Status: DISCONTINUED | OUTPATIENT
Start: 2025-05-06 | End: 2025-05-06

## 2025-05-06 RX ORDER — LEVETIRACETAM 10 MG/ML
1000 INJECTION, SOLUTION INTRAVENOUS DAILY
Refills: 0 | Status: DISCONTINUED | OUTPATIENT
Start: 2025-05-06 | End: 2025-05-07

## 2025-05-06 RX ORDER — LEVETIRACETAM 10 MG/ML
1500 INJECTION, SOLUTION INTRAVENOUS AT BEDTIME
Refills: 0 | Status: DISCONTINUED | OUTPATIENT
Start: 2025-05-06 | End: 2025-05-08

## 2025-05-06 RX ORDER — ESCITALOPRAM OXALATE 20 MG/1
10 TABLET ORAL DAILY
Refills: 0 | Status: DISCONTINUED | OUTPATIENT
Start: 2025-05-06 | End: 2025-05-07

## 2025-05-06 NOTE — H&P ADULT - ASSESSMENT
#sinus pause  #syncope  - continue cardiac telemetry monitoring  - TTE OP:  Left ventricular cavity is normal in size. Left ventricular wall thickness is normal. Left ventricular systolic function is normal with an ejection fraction of 56 % by Meyers's method of disks. There are no regional wall motion abnormalities seen.2.Normal left ventricular diastolic function, with normal left ventricular filling pressure.3.Normal right ventricular cavity size, with normal wall thickness, and normal right ventricular systolic function.4.No significant valvular disease.5.Estimated pulmonary artery systolic pressure is 31 mmHg, consistent with normal pulmonary artery pressure.6.No prior echocardiogram is available for comparison  -  thyroid function (TSH with free T4)  - please do labs including type and screen and coags  - atropine at the bedside  - pacing pads on  - plan for PPM implant this admission, OR timing is TBD, off dct ppx   - will follow    #epilepsy   -cw home meds    #hyperthyroidism   -cw methimazole  -tsh in am     #COPD  #smoker  -stable   -not on home inhalers  -OP pulm   -nicotine patch    #adrenal adenoma  - op f/u     #MISC  -DVT ppx: off until ppm   -GI ppx: none   -DIET: regular   -Activity: AAT  -Code Status: Full        #sinus pause  #syncope  -EKG no new changes   - continue cardiac telemetry monitoring  - TTE OP:  Left ventricular cavity is normal in size. Left ventricular wall thickness is normal. Left ventricular systolic function is normal with an ejection fraction of 56 % by Meyers's method of disks. There are no regional wall motion abnormalities seen.2.Normal left ventricular diastolic function, with normal left ventricular filling pressure.3.Normal right ventricular cavity size, with normal wall thickness, and normal right ventricular systolic function.4.No significant valvular disease.5.Estimated pulmonary artery systolic pressure is 31 mmHg, consistent with normal pulmonary artery pressure.6.No prior echocardiogram is available for comparison  -  thyroid function (TSH with free T4) ordered  - repeat labs in am , npo after midnight   - atropine at the bedside  - pacing pads on  - plan for PPM implant this admission, OR timing is TBD, off dvt ppx     #epilepsy   -cw home meds    #hyperthyroidism   -cw methimazole  -tsh in am     #COPD  #smoker  -stable   -not on home inhalers  -OP pulm   -nicotine patch Refused    #adrenal adenoma  #psoriasis   - op f/u     #MISC  -DVT ppx: off until ppm   -GI ppx: none   -DIET: regular   -Activity: AAT  -Code Status: Full        #sinus pause  #syncope  -EKG no new changes   - continue cardiac telemetry monitoring  - TTE OP:  Left ventricular cavity is normal in size. Left ventricular wall thickness is normal. Left ventricular systolic function is normal with an ejection fraction of 56 % by Meyers's method of disks. There are no regional wall motion abnormalities seen.2.Normal left ventricular diastolic function, with normal left ventricular filling pressure.3.Normal right ventricular cavity size, with normal wall thickness, and normal right ventricular systolic function.4.No significant valvular disease.5.Estimated pulmonary artery systolic pressure is 31 mmHg, consistent with normal pulmonary artery pressure.6.No prior echocardiogram is available for comparison  -  thyroid function (TSH with free T4) ordered  - repeat labs in am , npo after midnight   - atropine at the bedside  - pacing pads on  - plan for PPM implant this admission, OR timing is TBD, off dvt ppx     #Smoking  -counseled for smoking cessation  -pt agreeable to try to quit but refusing nicotine patch or gums for now     #epilepsy   -cw home meds    #hyperthyroidism   -cw methimazole  -tsh in am     #COPD  #smoker  -stable   -not on home inhalers  -OP pulm   -nicotine patch Refused    #adrenal adenoma  #psoriasis   - op f/u     #MISC  -DVT ppx: off until ppm   -GI ppx: none   -DIET: regular   -Activity: AAT  -Code Status: Full

## 2025-05-06 NOTE — ED PROVIDER NOTE - ATTENDING CONTRIBUTION TO CARE
55-year-old female with past medical history of epilepsy diagnosed in 2024 absence seizure's, thyroid nodules, hypothyroidism, cervical herniated disks, on Keppra, COPD, active smoker, adrenal adenoma, previous syncopal episodes for which she got an ILR implant with Dr. Haney presents to the ED after she was told to come in because her her ILR implant recorded 20 to 30-second pauses at 6:15 AM yesterday.  Patient states whenever this happens she has an absence seizure, denies full syncope, states she just sits and blanks out which she did yesterday around 6:15 AM.  Patient denies head trauma or LOC.  States no symptoms today.  No fever, chills, n/v, cp, sob, pleuritic cp, palpitations, diaphoresis, cough, ha/lh/dizziness, numbness/tingling, neck pain/ stiffness, abd pain, diarrhea, constipation, melena/brbpr, urinary symptoms, trauma, weakness, edema, calf pain/swelling/erythema, sick contacts, recent travel or rash. (+) smoker,     on exam: non toxic appearing pt sitting on stretcher speaking full sentences, no rash, mmm, neck supple. non-tender , radial pulses 2/4 b/l, no jvd, no pain to palpation to chest wall, no pain with movement/ not reproducible, ctabl w/ breath sounds present b/l, no wheezing or crackles, no accessory muscle use, no tachypnea, no stridor, bs present throughout all 4 quadrants, abd soft, nd, nt, no rebound tenderness or guarding, no cvat, FROM of ext, no calf pain/swelling/erythema, AAOx3. motor 5/5 and sensation intact throughout upper and lower ext. no focal deficits.    Plan: Monitor, EKG, CXR, labs, EP consult, reassess.

## 2025-05-06 NOTE — ED ADULT NURSE NOTE - OBJECTIVE STATEMENT
Pt. presents to the ED s/p a 30 second heart pause yesterday as per her cardiac monitor. Pt. denies chest pain. Pt. denies SOB. Pt. states she has a hx of absence seizures.

## 2025-05-06 NOTE — ED PROVIDER NOTE - OBJECTIVE STATEMENT
55-year-old female past medical history seizure disorder presenting to the ED sent in by electrophysiologist.  Patient states that she in March had a seizure episode and her heart stopped for a couple seconds and a heart monitor was placed.  Yesterday she had another seizure episode and was called today to go to the emergency department by her electrophysiologist because her heart stop for 21 seconds.  Patient currently denies symptoms.  Denies fever, chills, nausea, vomiting, chest pain, abdominal pain

## 2025-05-06 NOTE — CONSULT NOTE ADULT - ASSESSMENT
55-year-old female with long-standing history of epilepsy, hyperthyroidism, obesity, COPD, recurrent syncope s/p ILR placement on 2/18/2025 for a long term cardiac arrhythmia monitoring. Loop recorder implant detected a pause episode on 5/5/2025 at 6:34 AM - 21 second sinus pause. Patient reports not feeling well at that time and she thought she was having absence seizure. Patient reports sitting on the sofa at that time, denies LOC, denies fall.     - continue cardiac telemetry monitoring  - TTE noted  - please check thyroid function (TSH with free T4)  - please do labs including type and screen and coags  - please keep NPO after midnight   55-year-old female with long-standing history of epilepsy, hyperthyroidism, obesity, COPD, recurrent syncope s/p ILR placement on 2/18/2025 for a long term cardiac arrhythmia monitoring. Loop recorder implant detected a pause episode on 5/5/2025 at 6:34 AM - 21 second sinus pause. Patient reports not feeling well at that time and she thought she was having absence seizure. Patient reports sitting on the sofa at that time, denies LOC, denies fall.     - continue cardiac telemetry monitoring  - TTE noted  - please check thyroid function (TSH with free T4)  - please do labs including type and screen and coags  - atropine at the bedside  - pacing pads on  - plan for PPM implant this admission, OR timing is TBD  - will follow   55-year-old female with long-standing history of epilepsy, hyperthyroidism, obesity, COPD, recurrent syncope s/p ILR placement on 2/18/2025 for a long term cardiac arrhythmia monitoring. Loop recorder implant detected a pause episode on 5/5/2025 at 6:34 AM - 21 second sinus pause. Patient reports not feeling well at that time and she thought she was having absence seizure. Patient reports sitting on the sofa at that time, denies LOC, denies fall.     - continue cardiac telemetry monitoring  - Obtain TTE  - please check thyroid function (TSH with free T4)  - please do labs including type and screen and coags  - atropine at the bedside  - pacing pads on  - plan for PPM implant this admission, OR timing is TBD  - will follow

## 2025-05-06 NOTE — ED PROVIDER NOTE - PHYSICAL EXAMINATION
VITAL SIGNS: I have reviewed nursing notes and confirm.  CONSTITUTIONAL: Well-appearing, non-toxic, in NAD  SKIN: Warm dry, normal skin turgor  HEAD: NCAT  CARD: RRR, no murmurs, rubs or gallops  RESP: Clear to ausculation bilaterally.  No rales, rhonchi, or wheezing.  ABD: Soft, non-distended, non-tender, no rebound or guarding.

## 2025-05-06 NOTE — CONSULT NOTE ADULT - NS ATTEND AMEND GEN_ALL_CORE FT
Known patient from office admitted for 21 seconds sinus pause associated with seizures. Previously recommended a PPM for sinus pauses and syncope. She will need a PPM prior to discharge. TTE and TSH. Will plan for Friday. Keep NPO after midnight on Thursday.

## 2025-05-06 NOTE — H&P ADULT - NSHPLABSRESULTS_GEN_ALL_CORE
LABS:                        15.1   8.91  )-----------( 274      ( 06 May 2025 15:50 )             44.2     05-06    137  |  104  |  15  ----------------------------<  92  4.9   |  24  |  0.8    Ca    9.6      06 May 2025 15:50  Mg     2.6     05-06    TPro  7.1  /  Alb  4.4  /  TBili  <0.2  /  DBili  x   /  AST  32  /  ALT  33  /  AlkPhos  211[H]  05-06    PT/INR - ( 06 May 2025 16:57 )   PT: 9.80 sec;   INR: 0.83 ratio    < from: Xray Chest 2 Views PA/Lat (05.06.25 @ 14:45) >    IMPRESSION: Low lung volume.    No radiographic evidence of acute cardiopulmonary disease.    Left atrial appendage clip.    < end of copied text >           PTT - ( 06 May 2025 16:57 )  PTT:29.9 sec LABS:                        15.1   8.91  )-----------( 274      ( 06 May 2025 15:50 )             44.2     05-06    137  |  104  |  15  ----------------------------<  92  4.9   |  24  |  0.8    Ca    9.6      06 May 2025 15:50  Mg     2.6     05-06    TPro  7.1  /  Alb  4.4  /  TBili  <0.2  /  DBili  x   /  AST  32  /  ALT  33  /  AlkPhos  211[H]  05-06    PT/INR - ( 06 May 2025 16:57 )   PT: 9.80 sec;   INR: 0.83 ratio    < from: Xray Chest 2 Views PA/Lat (05.06.25 @ 14:45) >    IMPRESSION: Low lung volume.    No radiographic evidence of acute cardiopulmonary disease.    Left atrial appendage clip.>>>likely ILR    < end of copied text >           PTT - ( 06 May 2025 16:57 )  PTT:29.9 sec

## 2025-05-06 NOTE — H&P ADULT - NSHPPHYSICALEXAM_GEN_ALL_CORE
CONSTITUTIONAL: Well-appearing, non-toxic, in NAD  SKIN: Warm dry, normal skin turgor  HEAD: NCAT  CARD: RRR, no murmurs, rubs or gallops  RESP: Clear to ausculation bilaterally.  No rales, rhonchi, or wheezing.  ABD: Soft, non-distended, non-tender, no rebound or guarding CONSTITUTIONAL: Well-appearing, non-toxic, in NAD  SKIN: Warm dry, normal skin turgor  HEAD: NCAT  CARD: RRR, no murmurs, rubs or gallops  RESP: Clear to ausculation bilaterally.  No rales, rhonchi, or wheezing.  ABD: Soft, non-distended, non-tender, no rebound or guarding  CNS: AOx3, non-focal

## 2025-05-06 NOTE — H&P ADULT - NSVTERISKREFERASSESS_GEN_ALL_CORE
well developed, well nourished , in no acute distress , ambulating without difficulty , normal communication ability Refer to the Assessment tab to view/cancel completed assessment.

## 2025-05-06 NOTE — CONSULT NOTE ADULT - SUBJECTIVE AND OBJECTIVE BOX
Electrophysiology:  55-year-old female with long-standing history of epilepsy, hyperthyroidism, obesity, COPD, recurrent syncope s/p ILR placement on 2/18/2025 for a long term cardiac arrhythmia monitoring. The episode occur once every 4-6 weeks, preceded by a hot flush sensation and dizziness, and most commonly associated with a post-event confusion or trouble to speak.  She suffered a head trauma after one of the events. On March 24th, 2025 she felt an episode of focal seizure associated with severe sinus bradycardia and 4 seconds pause. She denies syncope at that time. Loop recorder implant detected a pause episode on 5/5/2025 at 6:34 AM - 21 second sinus pause was recorded. Patient reports not feeling well at that time and she thought she was having absence seizure. Patient reports sitting on the sofa at that time, denies LOC, denies fall.   REVIEW OF SYSTEMS  [x] A ten-point review of systems was otherwise negative except as noted.  [ ] Due to altered mental status/intubation, subjective information were not able to be obtained from the patient. History was obtained, to the extent possible, from review of the chart and collateral sources of information.      PAST MEDICAL & SURGICAL HISTORY:  History of epilepsy  Pulmonary nodule  Cervical herniated disc  Pancreatic enlargement  tail of pancreas enlarged/inflammed  Hyperthyroidism  H/O bilateral salpingectomy  H/O unilateral oophorectomy      Home Medications:  lacosamide 100 mg oral tablet: 0.5 tab(s) orally 2 times a day (18 Feb 2025 09:09)  Lexapro 10 mg oral tablet: 1 tab(s) orally once a day (18 Feb 2025 09:11)  methIMAzole 5 mg oral tablet: 2 tab(s) orally once a day (18 Feb 2025 09:11)  pyridoxine 100 mg oral tablet: 1 tab(s) orally every 24 hours (18 Feb 2025 09:11)      Allergies:  No Known Allergies      FAMILY HISTORY:      SOCIAL HISTORY:    CIGARETTES:  ALCOHOL:  MARIJUANA:  ILLICIT DRUGS:      PREVIOUS DIAGNOSTIC TESTING:    ECHO  FINDINGS:  TTE 1. Left ventricular cavity is normal in size. Left ventricular wall thickness is normal. Left ventricular systolic function is normal with an ejection fraction of 56 % by Meyers's method of disks. There are no regional wall motion abnormalities seen.2. Normal left ventricular diastolic function, with normal left ventricular filling pressure.3. Normal right ventricular cavity size, with normal wall thickness, and normal right ventricular systolic function.4. No significant valvular disease.5. Estimated pulmonary artery systolic pressure is 31 mmHg, consistent with normal pulmonary artery pressure.6. No prior echocardiogram is available for comparison      STRESS  FINDINGS:    CATHETERIZATION  FINDINGS:    ELECTROPHYSIOLOGY STUDY  FINDINGS:    CAROTID ULTRASOUND:  FINDINGS    VENOUS DUPLEX SCAN:  FINDINGS:    CHEST CT PULMONARY ANGIO with IV Contrast:  FINDINGS:      MEDICATIONS  (STANDING):    MEDICATIONS  (PRN):      Vital Signs Last 24 Hrs  T(C): 37 (06 May 2025 13:37), Max: 37 (06 May 2025 13:37)  T(F): 98.6 (06 May 2025 13:37), Max: 98.6 (06 May 2025 13:37)  HR: 75 (06 May 2025 13:37) (75 - 75)  BP: 147/92 (06 May 2025 13:37) (147/92 - 147/92)  BP(mean): --  RR: 18 (06 May 2025 13:37) (18 - 18)  SpO2: 95% (06 May 2025 13:37) (95% - 95%)    Parameters below as of 06 May 2025 13:37  Patient On (Oxygen Delivery Method): room air      PHYSICAL EXAM:  GENERAL: In no apparent distress, well nourished, and hydrated.  HEART: Regular rate and rhythm; No murmurs, rubs, or gallops.  PULMONARY: Clear to auscultation and perfusion  EXTREMITIES:  2+ Peripheral Pulses, No clubbing, cyanosis, or edema  NEUROLOGICAL: Grossly nonfocal      INTERPRETATION OF TELEMETRY:    ECG:        LABS:            BNP            RADIOLOGY & ADDITIONAL STUDIES:       Electrophysiology:  55-year-old female with long-standing history of epilepsy, hyperthyroidism, obesity, COPD, recurrent syncope s/p ILR placement on 2/18/2025 for a long term cardiac arrhythmia monitoring. The episode occur once every 4-6 weeks, preceded by a hot flush sensation and dizziness, and most commonly associated with a post-event confusion or trouble to speak.  She suffered a head trauma after one of the events. On March 24th, 2025 she felt an episode of focal seizure associated with severe sinus bradycardia and 4 seconds pause. She denies syncope at that time. Loop recorder implant detected a pause episode on 5/5/2025 at 6:34 AM - 21 second sinus pause was recorded. Patient reports not feeling well at that time and she thought she was having absence seizure. Patient reports sitting on the sofa at that time, denies LOC, denies fall.   REVIEW OF SYSTEMS  [x] A ten-point review of systems was otherwise negative except as noted.  [ ] Due to altered mental status/intubation, subjective information were not able to be obtained from the patient. History was obtained, to the extent possible, from review of the chart and collateral sources of information.      PAST MEDICAL & SURGICAL HISTORY:  History of epilepsy  Pulmonary nodule  Cervical herniated disc  Pancreatic enlargement  tail of pancreas enlarged/inflammed  Hyperthyroidism  H/O bilateral salpingectomy  H/O unilateral oophorectomy      Home Medications:  lacosamide 100 mg oral tablet: 0.5 tab(s) orally 2 times a day (18 Feb 2025 09:09)  Lexapro 10 mg oral tablet: 1 tab(s) orally once a day (18 Feb 2025 09:11)  methIMAzole 5 mg oral tablet: 2 tab(s) orally once a day (18 Feb 2025 09:11)  pyridoxine 100 mg oral tablet: 1 tab(s) orally every 24 hours (18 Feb 2025 09:11)      Allergies:  No Known Allergies      FAMILY HISTORY:      SOCIAL HISTORY:    CIGARETTES:  ALCOHOL:  MARIJUANA:  ILLICIT DRUGS:      PREVIOUS DIAGNOSTIC TESTING:    ECHO  FINDINGS:  TTE 1. Left ventricular cavity is normal in size. Left ventricular wall thickness is normal. Left ventricular systolic function is normal with an ejection fraction of 56 % by Meyers's method of disks. There are no regional wall motion abnormalities seen.2. Normal left ventricular diastolic function, with normal left ventricular filling pressure.3. Normal right ventricular cavity size, with normal wall thickness, and normal right ventricular systolic function.4. No significant valvular disease.5. Estimated pulmonary artery systolic pressure is 31 mmHg, consistent with normal pulmonary artery pressure.6. No prior echocardiogram is available for comparison      STRESS  FINDINGS:    CATHETERIZATION  FINDINGS:    ELECTROPHYSIOLOGY STUDY  FINDINGS:    CAROTID ULTRASOUND:  FINDINGS    VENOUS DUPLEX SCAN:  FINDINGS:    CHEST CT PULMONARY ANGIO with IV Contrast:  FINDINGS:      MEDICATIONS  (STANDING):    MEDICATIONS  (PRN):      Vital Signs Last 24 Hrs  T(C): 37 (06 May 2025 13:37), Max: 37 (06 May 2025 13:37)  T(F): 98.6 (06 May 2025 13:37), Max: 98.6 (06 May 2025 13:37)  HR: 75 (06 May 2025 13:37) (75 - 75)  BP: 147/92 (06 May 2025 13:37) (147/92 - 147/92)  BP(mean): --  RR: 18 (06 May 2025 13:37) (18 - 18)  SpO2: 95% (06 May 2025 13:37) (95% - 95%)    Parameters below as of 06 May 2025 13:37  Patient On (Oxygen Delivery Method): room air      PHYSICAL EXAM:  GENERAL: In no apparent distress, well nourished, and hydrated.  HEART: Regular rate and rhythm; No murmurs, rubs, or gallops.  PULMONARY: Clear to auscultation and perfusion  EXTREMITIES:  2+ Peripheral Pulses, No clubbing, cyanosis, or edema  NEUROLOGICAL: Grossly nonfocal      INTERPRETATION OF TELEMETRY: not on telemetry    ECG:        LABS:            BNP            RADIOLOGY & ADDITIONAL STUDIES:

## 2025-05-06 NOTE — H&P ADULT - HISTORY OF PRESENT ILLNESS
55-year-old female current smoker  with long-standing history of epilepsy, hyperthyroidism, anxiety, obesity, COPD, adrenal adenoma, recurrent syncope s/p ILR placement on 2/18/2025 for a long term cardiac arrhythmia monitoring presenting for pause sent in by EP.     The syncope occur once every 4-6 weeks, preceded by a hot flush sensation and dizziness, and most commonly associated with a post-event confusion or trouble to speak.  She suffered a head trauma after one of the events. On March 24th, 2025 she felt an episode of focal seizure associated with severe sinus bradycardia and 4 seconds pause. She denies syncope at that time. Loop recorder implant detected a pause episode on 5/5/2025 at 6:34 AM - 21 second sinus pause was recorded. Patient reports not feeling well at that time and she thought she was having absence seizure. Patient reports sitting on the sofa at that time, denies LOC, denies fall. Denies fever, chills, nausea, vomiting, chest pain, abdominal pain    Vital Signs   T(C): 37 (06 May 2025 13:37), Max: 37 (06 May 2025 13:37)  T(F): 98.6 (06 May 2025 13:37), Max: 98.6 (06 May 2025 13:37)  HR: 75 (06 May 2025 13:37) (75 - 75)  BP: 147/92 (06 May 2025 13:37) (147/92 - 147/92)  BP(mean): --  RR: 18 (06 May 2025 13:37) (18 - 18)  SpO2: 95% (06 May 2025 13:37) (95% - 95%)    Parameters below as of 06 May 2025 13:37  Patient On (Oxygen Delivery Method): room air     55-year-old female current smoker  with long-standing history of epilepsy, hyperthyroidism, anxiety, obesity, COPD, psoriasis, adrenal adenoma, recurrent syncope s/p ILR placement on 2/18/2025 for a long term cardiac arrhythmia monitoring presenting for pause sent in by [facility]- Dr Haney.     The syncope occur once every 4-6 weeks, preceded by a hot flush sensation and dizziness, and most commonly associated with a post-event confusion or trouble to speak.  She suffered a head trauma after one of the events. On March 24th, 2025 she felt an episode of focal seizure associated with severe sinus bradycardia and 4 seconds pause. She denies syncope at that time. Loop recorder implant detected a pause episode on 5/5/2025 at 6:34 AM - 21 second sinus pause was recorded. Patient reports not feeling well at that time and she thought she was having absence seizure. Patient reports sitting on the sofa at that time, denies LOC, denies fall. Denies fever, chills, nausea, vomiting, chest pain, abdominal pain    Vital Signs   T(C): 37 (06 May 2025 13:37), Max: 37 (06 May 2025 13:37)  T(F): 98.6 (06 May 2025 13:37), Max: 98.6 (06 May 2025 13:37)  HR: 75 (06 May 2025 13:37) (75 - 75)  BP: 147/92 (06 May 2025 13:37) (147/92 - 147/92)  BP(mean): --  RR: 18 (06 May 2025 13:37) (18 - 18)  SpO2: 95% (06 May 2025 13:37) (95% - 95%)    Parameters below as of 06 May 2025 13:37  Patient On (Oxygen Delivery Method): room air

## 2025-05-06 NOTE — PATIENT PROFILE ADULT - FALL HARM RISK - HARM RISK INTERVENTIONS
Communicate Risk of Fall with Harm to all staff/Monitor for mental status changes/Monitor gait and stability/Reinforce activity limits and safety measures with patient and family/Tailored Fall Risk Interventions/Visual Cue: Yellow wristband and red socks/Bed in lowest position, wheels locked, appropriate side rails in place/Call bell, personal items and telephone in reach/Instruct patient to call for assistance before getting out of bed or chair/Non-slip footwear when patient is out of bed/[place] to call system/Physically safe environment - no spills, clutter or unnecessary equipment/Purposeful Proactive Rounding/Room/bathroom lighting operational, light cord in reach

## 2025-05-06 NOTE — ED PROVIDER NOTE - CLINICAL SUMMARY MEDICAL DECISION MAKING FREE TEXT BOX
55-year-old female with past medical history of epilepsy diagnosed in 2024 absence seizure's, thyroid nodules, hypothyroidism, cervical herniated disks, on Keppra, COPD, active smoker, adrenal adenoma, previous syncopal episodes for which she got an ILR implant with Dr. Haney presents to the ED after she was told to come in because her her ILR implant recorded 20 to 30-second pauses at 6:15 AM yesterday.  Patient states whenever this happens she has an absence seizure, denies full syncope, states she just sits and blanks out which she did yesterday around 6:15 AM.  Patient denies head trauma or LOC.  States no symptoms today.  No fever, chills, n/v, cp, sob, pleuritic cp, palpitations, diaphoresis, cough, ha/lh/dizziness, numbness/tingling, neck pain/ stiffness, abd pain, diarrhea, constipation, melena/brbpr, urinary symptoms, trauma, weakness, edema, calf pain/swelling/erythema, sick contacts, recent travel or rash. (+) smoker,     on exam: non toxic appearing pt sitting on stretcher speaking full sentences, no rash, mmm, neck supple. non-tender , radial pulses 2/4 b/l, no jvd, no pain to palpation to chest wall, no pain with movement/ not reproducible, ctabl w/ breath sounds present b/l, no wheezing or crackles, no accessory muscle use, no tachypnea, no stridor, bs present throughout all 4 quadrants, abd soft, nd, nt, no rebound tenderness or guarding, no cvat, FROM of ext, no calf pain/swelling/erythema, AAOx3. motor 5/5 and sensation intact throughout upper and lower ext. no focal deficits.    Plan: Monitor, EKG, CXR, labs, EP consult, reassess.    Labs and EKG were ordered and reviewed.  Imaging was ordered and reviewed by me.  Appropriate medications for patient's presenting complaints were ordered and effects were reassessed.  Patient's records (prior hospital, ED visit) were reviewed.  Additional history was obtained from Family.

## 2025-05-06 NOTE — ED PROVIDER NOTE - PROGRESS NOTE DETAILS
ED Attending ADIEL Tamez  EP evaluated pt, pending attending eval. JV: Spoke to electrophysiology who stated to admit patient to cardiac telemetry and make patient n.p.o. after midnight for pacemaker placement tomorro

## 2025-05-06 NOTE — H&P ADULT - ATTENDING COMMENTS
Patient seen on 05/06/25.      #sinus pause  #syncope  -EKG no new changes   - continue cardiac telemetry monitoring  - TTE OP:  Left ventricular cavity is normal in size. Left ventricular wall thickness is normal. Left ventricular systolic function is normal with an ejection fraction of 56 % by Meyers's method of disks. There are no regional wall motion abnormalities seen.2.Normal left ventricular diastolic function, with normal left ventricular filling pressure.3.Normal right ventricular cavity size, with normal wall thickness, and normal right ventricular systolic function.4.No significant valvular disease.5.Estimated pulmonary artery systolic pressure is 31 mmHg, consistent with normal pulmonary artery pressure.6.No prior echocardiogram is available for comparison  -  thyroid function (TSH with free T4) ordered  - repeat labs in am , npo after midnight   - atropine at the bedside  - pacing pads on  - plan for PPM implant this admission, OR timing is TBD, off dvt ppx     #Smoking  -counseled for smoking cessation  -pt agreeable to try to quit but refusing nicotine patch or gums for now     #epilepsy   -cw home meds    #hyperthyroidism   -cw methimazole  -tsh in am     #COPD  #smoker  -stable   -not on home inhalers  -OP pulm   -nicotine patch Refused    #adrenal adenoma  #psoriasis   - op f/u     #MISC  -DVT ppx: off until ppm   -GI ppx: none   -DIET: regular   -Activity: AAT  -Code Status: Full

## 2025-05-06 NOTE — ED PROVIDER NOTE - DIFFERENTIAL DIAGNOSIS
Differential Diagnosis The differential diagnosis for patients clinical presentation includes but is not limited to:  arrythmia  stemi  nstemi  acs  seizure  metabolic vs infectious etiology

## 2025-05-06 NOTE — CONSULT NOTE ADULT - TIME BILLING
Chart, telemetry, imaging review, discussion with teams, diagnosis: Sinus arrest, syncope, seizures    Parminder Haney MD  Cardiac Electrophysiology

## 2025-05-06 NOTE — ED PROVIDER NOTE - EKG/XRAY ADDITIONAL INFORMATION
no ptx  Normal sinus rhythm 77 bpm, NV interval 168, QRS 90, QTc 463, no ST elevations or depressions

## 2025-05-06 NOTE — PATIENT PROFILE ADULT - NSPROPTRIGHTBILLOFRIGHTS_GEN_A_NUR
Problem: Patient Care Overview  Goal: Plan of Care Review  Outcome: Ongoing (interventions implemented as appropriate)  Pt's SpO2 95% on RA. No respiratory distress noted. Will continue to monitor SpO2.        patient

## 2025-05-07 ENCOUNTER — RESULT REVIEW (OUTPATIENT)
Age: 55
End: 2025-05-07

## 2025-05-07 PROBLEM — R91.1 SOLITARY PULMONARY NODULE: Chronic | Status: INACTIVE | Noted: 2021-06-23 | Resolved: 2025-05-06

## 2025-05-07 LAB
ALBUMIN SERPL ELPH-MCNC: 4.1 G/DL — SIGNIFICANT CHANGE UP (ref 3.5–5.2)
ALP SERPL-CCNC: 183 U/L — HIGH (ref 30–115)
ALT FLD-CCNC: 29 U/L — SIGNIFICANT CHANGE UP (ref 0–41)
ANION GAP SERPL CALC-SCNC: 13 MMOL/L — SIGNIFICANT CHANGE UP (ref 7–14)
APTT BLD: 31.8 SEC — SIGNIFICANT CHANGE UP (ref 27–39.2)
AST SERPL-CCNC: 24 U/L — SIGNIFICANT CHANGE UP (ref 0–41)
BASOPHILS # BLD AUTO: 0.06 K/UL — SIGNIFICANT CHANGE UP (ref 0–0.2)
BASOPHILS NFR BLD AUTO: 0.8 % — SIGNIFICANT CHANGE UP (ref 0–1)
BILIRUB SERPL-MCNC: 0.3 MG/DL — SIGNIFICANT CHANGE UP (ref 0.2–1.2)
BUN SERPL-MCNC: 14 MG/DL — SIGNIFICANT CHANGE UP (ref 10–20)
CALCIUM SERPL-MCNC: 8.9 MG/DL — SIGNIFICANT CHANGE UP (ref 8.4–10.5)
CHLORIDE SERPL-SCNC: 106 MMOL/L — SIGNIFICANT CHANGE UP (ref 98–110)
CO2 SERPL-SCNC: 21 MMOL/L — SIGNIFICANT CHANGE UP (ref 17–32)
CREAT SERPL-MCNC: 0.8 MG/DL — SIGNIFICANT CHANGE UP (ref 0.7–1.5)
EGFR: 88 ML/MIN/1.73M2 — SIGNIFICANT CHANGE UP
EGFR: 88 ML/MIN/1.73M2 — SIGNIFICANT CHANGE UP
EOSINOPHIL # BLD AUTO: 0.14 K/UL — SIGNIFICANT CHANGE UP (ref 0–0.7)
EOSINOPHIL NFR BLD AUTO: 1.8 % — SIGNIFICANT CHANGE UP (ref 0–8)
GLUCOSE SERPL-MCNC: 92 MG/DL — SIGNIFICANT CHANGE UP (ref 70–99)
HCT VFR BLD CALC: 43 % — SIGNIFICANT CHANGE UP (ref 37–47)
HGB BLD-MCNC: 14.7 G/DL — SIGNIFICANT CHANGE UP (ref 12–16)
IMM GRANULOCYTES NFR BLD AUTO: 0.3 % — SIGNIFICANT CHANGE UP (ref 0.1–0.3)
INR BLD: 0.95 RATIO — SIGNIFICANT CHANGE UP (ref 0.65–1.3)
LYMPHOCYTES # BLD AUTO: 2.22 K/UL — SIGNIFICANT CHANGE UP (ref 1.2–3.4)
LYMPHOCYTES # BLD AUTO: 28.8 % — SIGNIFICANT CHANGE UP (ref 20.5–51.1)
MAGNESIUM SERPL-MCNC: 2.7 MG/DL — HIGH (ref 1.8–2.4)
MCHC RBC-ENTMCNC: 30.4 PG — SIGNIFICANT CHANGE UP (ref 27–31)
MCHC RBC-ENTMCNC: 34.2 G/DL — SIGNIFICANT CHANGE UP (ref 32–37)
MCV RBC AUTO: 89 FL — SIGNIFICANT CHANGE UP (ref 81–99)
MONOCYTES # BLD AUTO: 0.6 K/UL — SIGNIFICANT CHANGE UP (ref 0.1–0.6)
MONOCYTES NFR BLD AUTO: 7.8 % — SIGNIFICANT CHANGE UP (ref 1.7–9.3)
NEUTROPHILS # BLD AUTO: 4.67 K/UL — SIGNIFICANT CHANGE UP (ref 1.4–6.5)
NEUTROPHILS NFR BLD AUTO: 60.5 % — SIGNIFICANT CHANGE UP (ref 42.2–75.2)
NRBC BLD AUTO-RTO: 0 /100 WBCS — SIGNIFICANT CHANGE UP (ref 0–0)
PHOSPHATE SERPL-MCNC: 3.9 MG/DL — SIGNIFICANT CHANGE UP (ref 2.1–4.9)
PLATELET # BLD AUTO: 233 K/UL — SIGNIFICANT CHANGE UP (ref 130–400)
PMV BLD: 10.8 FL — HIGH (ref 7.4–10.4)
POTASSIUM SERPL-MCNC: 4.2 MMOL/L — SIGNIFICANT CHANGE UP (ref 3.5–5)
POTASSIUM SERPL-SCNC: 4.2 MMOL/L — SIGNIFICANT CHANGE UP (ref 3.5–5)
PROT SERPL-MCNC: 6.7 G/DL — SIGNIFICANT CHANGE UP (ref 6–8)
PROTHROM AB SERPL-ACNC: 11.2 SEC — SIGNIFICANT CHANGE UP (ref 9.95–12.87)
RBC # BLD: 4.83 M/UL — SIGNIFICANT CHANGE UP (ref 4.2–5.4)
RBC # FLD: 12.8 % — SIGNIFICANT CHANGE UP (ref 11.5–14.5)
SODIUM SERPL-SCNC: 140 MMOL/L — SIGNIFICANT CHANGE UP (ref 135–146)
T3 SERPL-MCNC: 125 NG/DL — SIGNIFICANT CHANGE UP (ref 80–200)
T4 AB SER-ACNC: 9.1 UG/DL — SIGNIFICANT CHANGE UP (ref 4.6–12)
T4 FREE SERPL-MCNC: 1 NG/DL — SIGNIFICANT CHANGE UP (ref 0.9–1.8)
T4 FREE SERPL-MCNC: 1.2 NG/DL — SIGNIFICANT CHANGE UP (ref 0.9–1.8)
TSH SERPL-MCNC: 3.73 UIU/ML — SIGNIFICANT CHANGE UP (ref 0.27–4.2)
TSH SERPL-MCNC: 4.95 UIU/ML — HIGH (ref 0.27–4.2)
WBC # BLD: 7.71 K/UL — SIGNIFICANT CHANGE UP (ref 4.8–10.8)
WBC # FLD AUTO: 7.71 K/UL — SIGNIFICANT CHANGE UP (ref 4.8–10.8)

## 2025-05-07 PROCEDURE — 99232 SBSQ HOSP IP/OBS MODERATE 35: CPT

## 2025-05-07 PROCEDURE — 93306 TTE W/DOPPLER COMPLETE: CPT | Mod: 26

## 2025-05-07 RX ORDER — LACOSAMIDE 150 MG/1
50 TABLET, FILM COATED ORAL EVERY 12 HOURS
Refills: 0 | Status: DISCONTINUED | OUTPATIENT
Start: 2025-05-07 | End: 2025-05-07

## 2025-05-07 RX ORDER — ESCITALOPRAM OXALATE 20 MG/1
10 TABLET ORAL DAILY
Refills: 0 | Status: DISCONTINUED | OUTPATIENT
Start: 2025-05-07 | End: 2025-05-10

## 2025-05-07 RX ORDER — LACOSAMIDE 150 MG/1
1 TABLET, FILM COATED ORAL
Refills: 0 | DISCHARGE

## 2025-05-07 RX ORDER — ACETAMINOPHEN 500 MG/5ML
650 LIQUID (ML) ORAL EVERY 6 HOURS
Refills: 0 | Status: DISCONTINUED | OUTPATIENT
Start: 2025-05-07 | End: 2025-05-10

## 2025-05-07 RX ORDER — LACOSAMIDE 150 MG/1
150 TABLET, FILM COATED ORAL EVERY 12 HOURS
Refills: 0 | Status: DISCONTINUED | OUTPATIENT
Start: 2025-05-07 | End: 2025-05-10

## 2025-05-07 RX ORDER — LACOSAMIDE 150 MG/1
100 TABLET, FILM COATED ORAL ONCE
Refills: 0 | Status: DISCONTINUED | OUTPATIENT
Start: 2025-05-07 | End: 2025-05-07

## 2025-05-07 RX ORDER — LEVETIRACETAM 10 MG/ML
1000 INJECTION, SOLUTION INTRAVENOUS
Refills: 0 | Status: DISCONTINUED | OUTPATIENT
Start: 2025-05-07 | End: 2025-05-10

## 2025-05-07 RX ADMIN — LACOSAMIDE 150 MILLIGRAM(S): 150 TABLET, FILM COATED ORAL at 18:45

## 2025-05-07 RX ADMIN — LEVETIRACETAM 1000 MILLIGRAM(S): 10 INJECTION, SOLUTION INTRAVENOUS at 06:30

## 2025-05-07 RX ADMIN — Medication 1 APPLICATION(S): at 06:32

## 2025-05-07 RX ADMIN — ESCITALOPRAM OXALATE 10 MILLIGRAM(S): 20 TABLET ORAL at 06:20

## 2025-05-07 RX ADMIN — LEVETIRACETAM 1500 MILLIGRAM(S): 10 INJECTION, SOLUTION INTRAVENOUS at 18:54

## 2025-05-07 RX ADMIN — LACOSAMIDE 100 MILLIGRAM(S): 150 TABLET, FILM COATED ORAL at 07:59

## 2025-05-07 RX ADMIN — LACOSAMIDE 50 MILLIGRAM(S): 150 TABLET, FILM COATED ORAL at 07:01

## 2025-05-07 NOTE — PROGRESS NOTE ADULT - ASSESSMENT
#sinus pause  #syncope  -EKG no new changes   - continue cardiac telemetry monitoring  - TTE OP:  Left ventricular cavity is normal in size. Left ventricular wall thickness is normal. Left ventricular systolic function is normal with an ejection fraction of 56 % by Meyers's method of disks. There are no regional wall motion abnormalities seen.2.Normal left ventricular diastolic function, with normal left ventricular filling pressure.3.Normal right ventricular cavity size, with normal wall thickness, and normal right ventricular systolic function.4.No significant valvular disease.5.Estimated pulmonary artery systolic pressure is 31 mmHg, consistent with normal pulmonary artery pressure.6.No prior echocardiogram is available for comparison  -  thyroid function (TSH with free T4) ordered  - repeat labs in am , npo after midnight   - atropine at the bedside  - pacing pads on  - plan for PPM implant this admission, OR timing is TBD, off dvt ppx   - fu inhouse TTE    #Smoking  -counseled for smoking cessation  -pt agreeable to try to quit but refusing nicotine patch or gums for now     #epilepsy   -cw home meds    #hyperthyroidism   -cw methimazole  -tsh in am     #COPD  #smoker  -stable   -not on home inhalers  -OP pulm   -nicotine patch Refused    #adrenal adenoma  #psoriasis   - op f/u     #MISC  -DVT ppx: off until ppm   -GI ppx: none   -DIET: regular   -Activity: AAT  -Code Status: Full     Provider handoff: fu EP for PPM placement day, TTE, TSH

## 2025-05-07 NOTE — PROGRESS NOTE ADULT - ATTENDING COMMENTS
I saw and examined the patient at bedside.   She feels ok  Patient with sinus pause, plan for pacemaker  Continue tele.   TSH is normal

## 2025-05-07 NOTE — CHART NOTE - NSCHARTNOTEFT_GEN_A_CORE
Possible PPM tomorrow depending on EP schedule  Please make pt NPO after MN tonight  No AC in the am.

## 2025-05-07 NOTE — PROGRESS NOTE ADULT - SUBJECTIVE AND OBJECTIVE BOX
SUBJECTIVE/OVERNIGHT EVENTS  Today is hospital day 1d. This morning patient was seen and examined at bedside, resting comfortably in bed. No acute or major events overnight.      MEDICATIONS  STANDING MEDICATIONS  chlorhexidine 2% Cloths 1 Application(s) Topical <User Schedule>  escitalopram 10 milliGRAM(s) Oral daily  lacosamide 150 milliGRAM(s) Oral every 12 hours  levETIRAcetam 1000 milliGRAM(s) Oral <User Schedule>  levETIRAcetam 1500 milliGRAM(s) Oral at bedtime  methimazole 10 milliGRAM(s) Oral daily    PRN MEDICATIONS  acetaminophen     Tablet .. 650 milliGRAM(s) Oral every 6 hours PRN  atropine Injectable 1 milliGRAM(s) IV Push once PRN  melatonin 3 milliGRAM(s) Oral at bedtime PRN    VITALS  T(F): 97.7 (05-07-25 @ 05:53), Max: 98.6 (05-06-25 @ 13:37)  HR: 98 (05-07-25 @ 05:53) (63 - 98)  BP: 147/92 (05-07-25 @ 05:53) (147/92 - 156/93)  RR: 17 (05-07-25 @ 05:53) (17 - 18)  SpO2: 93% (05-07-25 @ 05:53) (93% - 98%)    Physical Exam:   GENERAL: NAD, lying in bed comfortably  HEAD:  Atraumatic, normocephalic  EYES: EOMI, PERRL  NECK: Supple, trachea midline, no JVD  HEART: Regular rate and rhythm  LUNGS: Unlabored respirations.  Clear to auscultation bilaterally, no crackles, wheezing, or rhonchi  ABDOMEN: Soft, nontender, nondistended, +BS  EXTREMITIES: 2+ peripheral pulses bilaterally. No clubbing, cyanosis, or edema  NERVOUS SYSTEM:  A&Ox3, moving all extremities, no focal deficits       LABS             14.7   7.71  )-----------( 233      ( 05-07-25 @ 06:28 )             43.0     140  |  106  |  14  -------------------------<  92   05-07-25 @ 06:28  4.2  |  21  |  0.8    Ca      8.9     05-07-25 @ 06:28  Phos   3.9     05-07-25 @ 06:28  Mg     2.7     05-07-25 @ 06:28    TPro  6.7  /  Alb  4.1  /  TBili  0.3  /  DBili  x   /  AST  24  /  ALT  29  /  AlkPhos  183  /  GGT  x     05-07-25 @ 06:28    PT/INR - ( 05-07-25 @ 06:28 )   PT: 11.20 sec;   INR: 0.95 ratio  PTT - ( 05-07-25 @ 06:28 )  PTT:31.8 sec      Urinalysis Basic - ( 07 May 2025 06:28 )    Color: x / Appearance: x / SG: x / pH: x  Gluc: 92 mg/dL / Ketone: x  / Bili: x / Urobili: x   Blood: x / Protein: x / Nitrite: x   Leuk Esterase: x / RBC: x / WBC x   Sq Epi: x / Non Sq Epi: x / Bacteria: x          IMAGING

## 2025-05-08 PROCEDURE — 99232 SBSQ HOSP IP/OBS MODERATE 35: CPT

## 2025-05-08 RX ORDER — LEVETIRACETAM 10 MG/ML
1500 INJECTION, SOLUTION INTRAVENOUS
Refills: 0 | Status: DISCONTINUED | OUTPATIENT
Start: 2025-05-08 | End: 2025-05-10

## 2025-05-08 RX ORDER — BACITRACIN 500 UNIT/G
1 OINTMENT (GRAM) TOPICAL EVERY 8 HOURS
Refills: 0 | Status: DISCONTINUED | OUTPATIENT
Start: 2025-05-08 | End: 2025-05-10

## 2025-05-08 RX ADMIN — Medication 1 APPLICATION(S): at 22:46

## 2025-05-08 RX ADMIN — LACOSAMIDE 150 MILLIGRAM(S): 150 TABLET, FILM COATED ORAL at 06:03

## 2025-05-08 RX ADMIN — Medication 1 APPLICATION(S): at 12:24

## 2025-05-08 RX ADMIN — LEVETIRACETAM 1500 MILLIGRAM(S): 10 INJECTION, SOLUTION INTRAVENOUS at 17:25

## 2025-05-08 RX ADMIN — Medication 1 APPLICATION(S): at 06:02

## 2025-05-08 RX ADMIN — LEVETIRACETAM 1000 MILLIGRAM(S): 10 INJECTION, SOLUTION INTRAVENOUS at 06:03

## 2025-05-08 RX ADMIN — ESCITALOPRAM OXALATE 10 MILLIGRAM(S): 20 TABLET ORAL at 06:05

## 2025-05-08 RX ADMIN — LACOSAMIDE 150 MILLIGRAM(S): 150 TABLET, FILM COATED ORAL at 17:25

## 2025-05-08 NOTE — PROGRESS NOTE ADULT - ASSESSMENT
#sinus pause  #syncope  -EKG no new changes   - continue cardiac telemetry monitoring  - TTE OP:  Left ventricular cavity is normal in size. Left ventricular wall thickness is normal. Left ventricular systolic function is normal with an ejection fraction of 56 % by Meyers's method of disks. There are no regional wall motion abnormalities seen.2.Normal left ventricular diastolic function, with normal left ventricular filling pressure.3.Normal right ventricular cavity size, with normal wall thickness, and normal right ventricular systolic function.4.No significant valvular disease.5.Estimated pulmonary artery systolic pressure is 31 mmHg, consistent with normal pulmonary artery pressure.6.No prior echocardiogram is available for comparison  -TSH 4.95, t4 wnl  - atropine at the bedside  - pacing pads on  - plan for PPM implant tomorrow  - TTE EF 70%, WNL    #Smoking  -counseled for smoking cessation  -pt agreeable to try to quit but refusing nicotine patch or gums for now     #epilepsy   -cw home meds    #hyperthyroidism   -cw methimazole  -tsh in am     #COPD  #smoker  -stable   -not on home inhalers  -OP pulm   -nicotine patch Refused    #adrenal adenoma  #psoriasis   - op f/u     #MISC  -DVT ppx: off until ppm   -GI ppx: none   -DIET: regular   -Activity: AAT  -Code Status: Full     Provider handoff: PPM tomorrow

## 2025-05-08 NOTE — PROGRESS NOTE ADULT - SUBJECTIVE AND OBJECTIVE BOX
SUBJECTIVE/OVERNIGHT EVENTS  Today is hospital day 2d. This morning patient was seen and examined at bedside, resting comfortably in bed. No acute or major events overnight.    MEDICATIONS  STANDING MEDICATIONS  chlorhexidine 2% Cloths 1 Application(s) Topical <User Schedule>  escitalopram 10 milliGRAM(s) Oral daily  lacosamide 150 milliGRAM(s) Oral every 12 hours  levETIRAcetam 1000 milliGRAM(s) Oral <User Schedule>  levETIRAcetam 1500 milliGRAM(s) Oral at bedtime  methimazole 10 milliGRAM(s) Oral at bedtime    PRN MEDICATIONS  acetaminophen     Tablet .. 650 milliGRAM(s) Oral every 6 hours PRN  atropine Injectable 1 milliGRAM(s) IV Push once PRN  melatonin 3 milliGRAM(s) Oral at bedtime PRN    VITALS  T(F): 97.3 (05-08-25 @ 05:26), Max: 98.2 (05-07-25 @ 20:10)  HR: 57 (05-08-25 @ 05:26) (57 - 69)  BP: 127/75 (05-08-25 @ 05:26) (118/77 - 144/78)  RR: 16 (05-07-25 @ 20:10) (16 - 16)  SpO2: 96% (05-08-25 @ 05:26) (96% - 96%)    Physical Exam:   GENERAL: NAD, lying in bed comfortably  HEAD:  Atraumatic, normocephalic  EYES: EOMI, PERRL  NECK: Supple, trachea midline, no JVD  HEART: Regular rate and rhythm  LUNGS: Unlabored respirations.  Clear to auscultation bilaterally, no crackles, wheezing, or rhonchi  ABDOMEN: Soft, nontender, nondistended, +BS  EXTREMITIES: 2+ peripheral pulses bilaterally. No clubbing, cyanosis, or edema  NERVOUS SYSTEM:  A&Ox3, moving all extremities, no focal deficits     LABS             14.7   7.71  )-----------( 233      ( 05-07-25 @ 06:28 )             43.0     140  |  106  |  14  -------------------------<  92   05-07-25 @ 06:28  4.2  |  21  |  0.8    Ca      8.9     05-07-25 @ 06:28  Phos   3.9     05-07-25 @ 06:28  Mg     2.7     05-07-25 @ 06:28    TPro  6.7  /  Alb  4.1  /  TBili  0.3  /  DBili  x   /  AST  24  /  ALT  29  /  AlkPhos  183  /  GGT  x     05-07-25 @ 06:28    PT/INR - ( 05-07-25 @ 06:28 )   PT: 11.20 sec;   INR: 0.95 ratio  PTT - ( 05-07-25 @ 06:28 )  PTT:31.8 sec      Urinalysis Basic - ( 07 May 2025 06:28 )    Color: x / Appearance: x / SG: x / pH: x  Gluc: 92 mg/dL / Ketone: x  / Bili: x / Urobili: x   Blood: x / Protein: x / Nitrite: x   Leuk Esterase: x / RBC: x / WBC x   Sq Epi: x / Non Sq Epi: x / Bacteria: x          IMAGING

## 2025-05-08 NOTE — PROGRESS NOTE ADULT - ATTENDING COMMENTS
I saw and examined the patient at bedside.   She feels ok  Patient with sinus pause, plan for pacemaker  Continue tele.   TSH is normal .

## 2025-05-08 NOTE — CHART NOTE - NSCHARTNOTEFT_GEN_A_CORE
Patient with SND  (21 second sinus pause on ILR).  Scheduled for PPM.  Unable to bring to OR today.   EF: 70%    - Will add on tentatively for tomorrow.   - Keep NPO after MN  - Hold Heparin / Lovenox / NOAC tonight and in AM  - Avoid any GLP-1 and SGLT-2 inhibitors  - Avoid AVN blockers  - Plan discussed with patient      LABS:                        14.7   7.71  )-----------( 233      ( 07 May 2025 06:28 )             43.0       05-07    140  |  106  |  14  ----------------------------<  92  4.2   |  21  |  0.8    Ca    8.9      07 May 2025 06:28  Phos  3.9     05-07  Mg     2.7     05-07    TPro  6.7  /  Alb  4.1  /  TBili  0.3  /  DBili  x   /  AST  24  /  ALT  29  /  AlkPhos  183[H]  05-07      PT/INR - ( 07 May 2025 06:28 )   PT: 11.20 sec;   INR: 0.95 ratio         PTT - ( 07 May 2025 06:28 )  PTT:31.8 sec        Thyroid Stimulating Hormone, Serum: 4.95 uIU/mL [0.27 - 4.20] (05-07-25 @ 06:28)  Thyroid Stimulating Hormone, Serum: 3.73 uIU/mL [0.27 - 4.20] (05-06-25 @ 16:57)        EPS 2199

## 2025-05-09 ENCOUNTER — TRANSCRIPTION ENCOUNTER (OUTPATIENT)
Age: 55
End: 2025-05-09

## 2025-05-09 LAB — BLD GP AB SCN SERPL QL: SIGNIFICANT CHANGE UP

## 2025-05-09 PROCEDURE — 71045 X-RAY EXAM CHEST 1 VIEW: CPT | Mod: 26

## 2025-05-09 PROCEDURE — 76937 US GUIDE VASCULAR ACCESS: CPT | Mod: 26,59

## 2025-05-09 PROCEDURE — 99232 SBSQ HOSP IP/OBS MODERATE 35: CPT

## 2025-05-09 PROCEDURE — 33274 TCAT INSJ/RPL PERM LDLS PM: CPT

## 2025-05-09 RX ORDER — VANCOMYCIN HCL IN 5 % DEXTROSE 1.5G/250ML
1000 PLASTIC BAG, INJECTION (ML) INTRAVENOUS ONCE
Refills: 0 | Status: COMPLETED | OUTPATIENT
Start: 2025-05-10 | End: 2025-05-10

## 2025-05-09 RX ORDER — VANCOMYCIN HCL IN 5 % DEXTROSE 1.5G/250ML
1500 PLASTIC BAG, INJECTION (ML) INTRAVENOUS ONCE
Refills: 0 | Status: COMPLETED | OUTPATIENT
Start: 2025-05-09 | End: 2025-05-09

## 2025-05-09 RX ADMIN — LACOSAMIDE 150 MILLIGRAM(S): 150 TABLET, FILM COATED ORAL at 17:25

## 2025-05-09 RX ADMIN — Medication 1 APPLICATION(S): at 05:33

## 2025-05-09 RX ADMIN — LEVETIRACETAM 1000 MILLIGRAM(S): 10 INJECTION, SOLUTION INTRAVENOUS at 05:30

## 2025-05-09 RX ADMIN — LACOSAMIDE 150 MILLIGRAM(S): 150 TABLET, FILM COATED ORAL at 05:30

## 2025-05-09 RX ADMIN — LEVETIRACETAM 1500 MILLIGRAM(S): 10 INJECTION, SOLUTION INTRAVENOUS at 17:25

## 2025-05-09 RX ADMIN — Medication 300 MILLIGRAM(S): at 11:50

## 2025-05-09 RX ADMIN — Medication 1 APPLICATION(S): at 05:31

## 2025-05-09 RX ADMIN — Medication 1 APPLICATION(S): at 22:04

## 2025-05-09 NOTE — CHART NOTE - NSCHARTNOTEFT_GEN_A_CORE
Brief post-procedure note    Procedure performed: Single Chamber leadless Aveir implantation    Pre-operative diagnosis: Sinus pauses    Post-operative diagnosis: Sinus pauses    INDICATION:  Sinus pauses    ANESTHESIA TYPE:  [  ] General Anesthesia  [ X ] Sedation  [ X ] Local/Regional    ESTIMATED BLOOD LOSS:     20 mL    CONDITION  [  ] Critical  [  ] Serious  [  ]Fair  [ X ]Good    ACCESS:  RFV 27F    IMPLANT:   Successful single chamber leadless Aveir implantation  See procedure report for thresholds and parameters.    SPECIMENS REMOVED (IF APPLICABLE): N/A    HEMOSTASIS:  Perclose x2 and figure of 8    COMPLICATIONS: No immediate complications. Echo showed no pericardial effusion at the end of the procedure.     PLAN:  CXR STAT  CXR PA/lateral  Device interrogation in AM  Remove figure of 8 in AM  Resume home meds  No heparin products for 48 hours unless discussed with EP  Follow-up in about 1 month  Patient enrolled in the remote monitoring device program  Check wound per protocol    Pamrinder Haney MD  Cardiac Electrophysiology Brief post-procedure note    Procedure performed: Single Chamber leadless Aveir implantation    Pre-operative diagnosis: Sinus pauses    Post-operative diagnosis: Sinus pauses    INDICATION:  Sinus pauses    ANESTHESIA TYPE:  [  ] General Anesthesia  [ X ] Sedation  [ X ] Local/Regional    ESTIMATED BLOOD LOSS:     20 mL    CONDITION  [  ] Critical  [  ] Serious  [  ]Fair  [ X ]Good    ACCESS:  RFV 27F    IMPLANT:   Successful single chamber leadless Aveir implantation  See procedure report for thresholds and parameters.    SPECIMENS REMOVED (IF APPLICABLE): N/A    HEMOSTASIS:  Perclose x2 and figure of 8    COMPLICATIONS: No immediate complications. Echo showed no pericardial effusion at the end of the procedure.     PLAN:  CXR STAT  CXR PA/lateral  Device interrogation in AM  Remove figure of 8 in AM  Resume home meds  No heparin products for 48 hours unless discussed with EP  Follow-up in about 1 month  Check wound per protocol    Parminder Haney MD  Cardiac Electrophysiology Brief post-procedure note    Procedure performed: Single Chamber leadless Aveir implantation    Pre-operative diagnosis: Sinus pauses    Post-operative diagnosis: Sinus pauses    INDICATION:  Sinus pauses    ANESTHESIA TYPE:  [  ] General Anesthesia  [ X ] Sedation  [ X ] Local/Regional    ESTIMATED BLOOD LOSS:     20 mL    CONDITION  [  ] Critical  [  ] Serious  [  ]Fair  [ X ]Good    ACCESS:  RFV 27F    IMPLANT:   Successful single chamber leadless Aveir implantation  See procedure report for thresholds and parameters.    SPECIMENS REMOVED (IF APPLICABLE): N/A    HEMOSTASIS:  Perclose x2 and figure of 8    COMPLICATIONS: No immediate complications. Echo showed no pericardial effusion at the end of the procedure.     PLAN:  Telemetry  Bedrest 4 hours  CXR STAT  CXR PA/lateral  IV vancomycin 1 g q12h for 2 doses  Device interrogation in AM  Remove figure of 8 in AM  Resume home meds  No heparin products for 48 hours unless discussed with EP  Follow-up in about 1 month  Check wound per protocol    Parminder Haney MD  Cardiac Electrophysiology

## 2025-05-09 NOTE — ASU PATIENT PROFILE, ADULT - FALL HARM RISK - HARM RISK INTERVENTIONS

## 2025-05-09 NOTE — DISCHARGE NOTE PROVIDER - HOSPITAL COURSE
55-year-old female current smoker  with long-standing history of epilepsy, hyperthyroidism, anxiety, obesity, COPD, psoriasis, adrenal adenoma, recurrent syncope s/p ILR placement on 2/18/2025 for a long term cardiac arrhythmia monitoring presenting for pause sent in by EP- Dr Haney.     The syncope occur once every 4-6 weeks, preceded by a hot flush sensation and dizziness, and most commonly associated with a post-event confusion or trouble to speak.  She suffered a head trauma after one of the events. On March 24th, 2025 she felt an episode of focal seizure associated with severe sinus bradycardia and 4 seconds pause. She denies syncope at that time. Loop recorder implant detected a pause episode on 5/5/2025 at 6:34 AM - 21 second sinus pause was recorded. Patient reports not feeling well at that time and she thought she was having absence seizure. Patient reports sitting on the sofa at that time, denies LOC, denies fall. Denies fever, chills, nausea, vomiting, chest pain, abdominal pain    Vital Signs WNL    #sinus pause  #syncope  -EKG no new changes   - continue cardiac telemetry monitoring  - TTE OP:  Left ventricular cavity is normal in size. Left ventricular wall thickness is normal. Left ventricular systolic function is normal with an ejection fraction of 56 % by Meyers's method of disks. There are no regional wall motion abnormalities seen.2.Normal left ventricular diastolic function, with normal left ventricular filling pressure.3.Normal right ventricular cavity size, with normal wall thickness, and normal right ventricular systolic function.4.No significant valvular disease.5.Estimated pulmonary artery systolic pressure is 31 mmHg, consistent with normal pulmonary artery pressure.6.No prior echocardiogram is available for comparison  -TSH 4.95, t4 wnl  - atropine at the bedside  - pacing pads on  - plan for PPM today  - TTE EF 70%, WNL    #Smoking  -counseled for smoking cessation  -pt agreeable to try to quit but refusing nicotine patch or gums for now     #epilepsy   -cw home meds    #hyperthyroidism   -cw methimazole  -tsh in am     #COPD  #smoker  -stable   -not on home inhalers  -OP pulm   -nicotine patch Refused    #adrenal adenoma  #psoriasis   - op f/u       In summary: 55-year-old female current smoker  with long-standing history of epilepsy, hyperthyroidism, anxiety, obesity, COPD, psoriasis, adrenal adenoma, recurrent syncope s/p ILR placement on 2/18/2025 for a long term cardiac arrhythmia monitoring presenting for pause sent in by EP- Dr Haney. Single Chamber leadless Aveir implantation successful. F/u with EP and PCP upon discharge. Discussion of discharge plan of care, including discharge diagnoses, medication reconciliation, and follow-ups was conducted with Dr. Chandler on 5/10 at 9AM , and discharge was approved.    55-year-old female current smoker  with long-standing history of epilepsy, hyperthyroidism, anxiety, obesity, COPD, psoriasis, adrenal adenoma, recurrent syncope s/p ILR placement on 2/18/2025 for a long term cardiac arrhythmia monitoring presenting for pause sent in by EP- Dr Haney.     The syncope occur once every 4-6 weeks, preceded by a hot flush sensation and dizziness, and most commonly associated with a post-event confusion or trouble to speak.  She suffered a head trauma after one of the events. On March 24th, 2025 she felt an episode of focal seizure associated with severe sinus bradycardia and 4 seconds pause. She denies syncope at that time. Loop recorder implant detected a pause episode on 5/5/2025 at 6:34 AM - 21 second sinus pause was recorded. Patient reports not feeling well at that time and she thought she was having absence seizure. Patient reports sitting on the sofa at that time, denies LOC, denies fall. Denies fever, chills, nausea, vomiting, chest pain, abdominal pain    Vital Signs WNL    #sinus pause  #syncope  -EKG no new changes   TSH 4.95, t4 wnl  TTE EF 70%, WNL  s/p leadless pacemaker placement 5/9/25   Seen by EP, stable for discharge, follow up outpatient.     COVID-19 Exposure:   Patient is asymptomatic  Advised to test her self if she developed cough, fever or fatigue.     #Smoking  -counseled for smoking cessation  -pt agreeable to try to quit but refusing nicotine patch or gums for now     #epilepsy   -cw home meds    #hyperthyroidism   -cw methimazole  TSH 4.9 mildly elevated, she has appointment this month with her endocrinologist.     #COPD  #smoker  -stable   -not on home inhalers  -OP pulm   -nicotine patch Refused    #adrenal adenoma  #psoriasis   - op f/u       In summary: 55-year-old female current smoker  with long-standing history of epilepsy, hyperthyroidism, anxiety, obesity, COPD, psoriasis, adrenal adenoma, recurrent syncope s/p ILR placement on 2/18/2025 for a long term cardiac arrhythmia monitoring presenting for pause sent in by EP- Dr Haney. Single Chamber leadless Aveir implantation successful. F/u with EP and PCP upon discharge. Discussion of discharge plan of care, including discharge diagnoses, medication reconciliation, and follow-ups was conducted with Dr. Chandler on 5/10 at 9AM , and discharge was approved.

## 2025-05-09 NOTE — DISCHARGE NOTE PROVIDER - PROVIDER TOKENS
PROVIDER:[TOKEN:[[id number]],FOLLOWUP:[2 weeks]],PROVIDER:[TOKEN:[[id number]],FOLLOWUP:[2 weeks],ESTABLISHEDPATIENT:[T]]

## 2025-05-09 NOTE — DISCHARGE NOTE PROVIDER - NSDCFUSCHEDAPPT_GEN_ALL_CORE_FT
[facility] Physician [facility]  CARDIOLOGY [address]  Scheduled Appointment: 05/20/2025    Araceli Loera  [facility]  ENDOCRIN [address]  Scheduled Appointment: 06/19/2025     [facility]  CARDIOLOGY [address]  Scheduled Appointment: 05/20/2025    [facility]  ELECTROPH [address]  Scheduled Appointment: 06/12/2025    Araceli Loera  [facility]  ENDOCRIN [address]  Scheduled Appointment: 06/19/2025

## 2025-05-09 NOTE — ASU PATIENT PROFILE, ADULT - NSICDXPASTMEDICALHX_GEN_ALL_CORE_FT
PAST MEDICAL HISTORY:  Cervical herniated disc     History of epilepsy     Hyperthyroidism     Pancreatic enlargement tail of pancreas enlarged/inflammed

## 2025-05-09 NOTE — DISCHARGE NOTE PROVIDER - CARE PROVIDER_API CALL
Parminder Haney  Cardiac Electrophysiology  [address], [address]  [place] [address]  Phone: [phone]  Fax: [phone]  Follow Up Time: 2 weeks    Reina John  Internal Medicine  [address] [place] [address]  Phone: [phone]  Fax: [phone]  Established Patient  Follow Up Time: 2 weeks

## 2025-05-09 NOTE — DISCHARGE NOTE PROVIDER - NSDCMRMEDTOKEN_GEN_ALL_CORE_FT
Levetiracetam extended release 500mg oral tablet: take 2 tablets in the morning &amp; 3 tablets at bedtime  Lexapro 10 mg oral tablet: 1 tab(s) orally once a day  methIMAzole 5 mg oral tablet: 2 tab(s) orally once a day  Vimpat 150 mg oral tablet: 1 tab(s) orally 2 times a day

## 2025-05-09 NOTE — DISCHARGE NOTE PROVIDER - NSDCCPCAREPLAN_GEN_ALL_CORE_FT
PRINCIPAL DISCHARGE DIAGNOSIS  Diagnosis: Sinus pause  Assessment and Plan of Treatment: You were admitted for a sinus pause and a pacemaker was placed successfully. Please follow up with Dr. Cook upon discharge as well as your primary care doctor.   Please seek medical attention if you have palpitations, shortness of breath, dizziness, syncope or any recurrence of you previous symptoms.

## 2025-05-09 NOTE — PROGRESS NOTE ADULT - SUBJECTIVE AND OBJECTIVE BOX
SUBJECTIVE/OVERNIGHT EVENTS  Today is hospital day 3d. This morning patient was seen and examined at bedside, resting comfortably in bed. No acute or major events overnight.      MEDICATIONS  STANDING MEDICATIONS  bacitracin   Ointment 1 Application(s) Topical every 8 hours  chlorhexidine 2% Cloths 1 Application(s) Topical <User Schedule>  escitalopram 10 milliGRAM(s) Oral daily  lacosamide 150 milliGRAM(s) Oral every 12 hours  levETIRAcetam 1500 milliGRAM(s) Oral <User Schedule>  levETIRAcetam 1000 milliGRAM(s) Oral <User Schedule>  methimazole 10 milliGRAM(s) Oral <User Schedule>    PRN MEDICATIONS  acetaminophen     Tablet .. 650 milliGRAM(s) Oral every 6 hours PRN  atropine Injectable 1 milliGRAM(s) IV Push once PRN  melatonin 3 milliGRAM(s) Oral at bedtime PRN    VITALS  T(F): 97.5 (05-09-25 @ 04:56), Max: 98 (05-08-25 @ 20:36)  HR: 55 (05-09-25 @ 04:56) (55 - 71)  BP: 136/81 (05-09-25 @ 04:56) (122/79 - 143/89)  RR: 18 (05-09-25 @ 04:56) (16 - 18)  SpO2: 97% (05-09-25 @ 04:56) (96% - 97%)    Physical Exam:   GENERAL: NAD, lying in bed comfortably  HEAD:  Atraumatic, normocephalic  EYES: EOMI, PERRL  NECK: Supple, trachea midline, no JVD  HEART: Regular rate and rhythm  LUNGS: Unlabored respirations.  Clear to auscultation bilaterally, no crackles, wheezing, or rhonchi  ABDOMEN: Soft, nontender, nondistended, +BS  EXTREMITIES: 2+ peripheral pulses bilaterally. No clubbing, cyanosis, or edema  NERVOUS SYSTEM:  A&Ox3, moving all extremities, no focal deficits     LABS                    IMAGING

## 2025-05-09 NOTE — DISCHARGE NOTE PROVIDER - CARE PROVIDERS DIRECT ADDRESSES
[email or web address]

## 2025-05-09 NOTE — PROGRESS NOTE ADULT - ASSESSMENT
#sinus pause  #syncope  -EKG no new changes   - continue cardiac telemetry monitoring  - TTE OP:  Left ventricular cavity is normal in size. Left ventricular wall thickness is normal. Left ventricular systolic function is normal with an ejection fraction of 56 % by Meyers's method of disks. There are no regional wall motion abnormalities seen.2.Normal left ventricular diastolic function, with normal left ventricular filling pressure.3.Normal right ventricular cavity size, with normal wall thickness, and normal right ventricular systolic function.4.No significant valvular disease.5.Estimated pulmonary artery systolic pressure is 31 mmHg, consistent with normal pulmonary artery pressure.6.No prior echocardiogram is available for comparison  -TSH 4.95, t4 wnl  - atropine at the bedside  - pacing pads on  - plan for PPM today  - TTE EF 70%, WNL    #Smoking  -counseled for smoking cessation  -pt agreeable to try to quit but refusing nicotine patch or gums for now     #epilepsy   -cw home meds    #hyperthyroidism   -cw methimazole  -tsh in am     #COPD  #smoker  -stable   -not on home inhalers  -OP pulm   -nicotine patch Refused    #adrenal adenoma  #psoriasis   - op f/u     #MISC  -DVT ppx: off until ppm   -GI ppx: none   -DIET: regular   -Activity: AAT  -Code Status: Full     Provider handoff: PPM today

## 2025-05-10 ENCOUNTER — TRANSCRIPTION ENCOUNTER (OUTPATIENT)
Age: 55
End: 2025-05-10

## 2025-05-10 VITALS
OXYGEN SATURATION: 98 % | HEART RATE: 59 BPM | RESPIRATION RATE: 17 BRPM | SYSTOLIC BLOOD PRESSURE: 136 MMHG | DIASTOLIC BLOOD PRESSURE: 86 MMHG | TEMPERATURE: 98 F

## 2025-05-10 LAB
FLUAV AG NPH QL: SIGNIFICANT CHANGE UP
FLUBV AG NPH QL: SIGNIFICANT CHANGE UP
RSV RNA NPH QL NAA+NON-PROBE: SIGNIFICANT CHANGE UP
SARS-COV-2 RNA SPEC QL NAA+PROBE: SIGNIFICANT CHANGE UP
SOURCE RESPIRATORY: SIGNIFICANT CHANGE UP

## 2025-05-10 PROCEDURE — 93286 PERI-PX EVAL PM/LDLS PM IP: CPT | Mod: 26

## 2025-05-10 PROCEDURE — 71046 X-RAY EXAM CHEST 2 VIEWS: CPT | Mod: 26

## 2025-05-10 PROCEDURE — 99239 HOSP IP/OBS DSCHRG MGMT >30: CPT | Mod: GC

## 2025-05-10 RX ADMIN — Medication 1 APPLICATION(S): at 05:11

## 2025-05-10 RX ADMIN — LACOSAMIDE 150 MILLIGRAM(S): 150 TABLET, FILM COATED ORAL at 05:05

## 2025-05-10 RX ADMIN — Medication 1 APPLICATION(S): at 05:05

## 2025-05-10 RX ADMIN — Medication 250 MILLIGRAM(S): at 09:45

## 2025-05-10 RX ADMIN — LEVETIRACETAM 1000 MILLIGRAM(S): 10 INJECTION, SOLUTION INTRAVENOUS at 05:05

## 2025-05-10 NOTE — DISCHARGE NOTE NURSING/CASE MANAGEMENT/SOCIAL WORK - PATIENT PORTAL LINK FT
You can access the FollowMyHealth Patient Portal offered by [facility] by registering at the following website: [email or web address]. By joining [facility]’s FollowMyHealth portal, you will also be able to view your health information using other applications (apps) compatible with our system.

## 2025-05-10 NOTE — PROGRESS NOTE ADULT - ASSESSMENT
A/P  Patient S/P Single Chamber Aveir PPM  Paient is doing well  - No  Anticoagulation for 48 hours  - Cont other meds  - Chest XRay is pending  - No heavy lifting x 1 week  - Pt may shower today  - No bath/swimming x 1 week  - ok to discharge home today PENDING chest xray and reading  - follow up with EP in 1 month   A/P  Patient S/P Single Chamber Aveir PPM  Paient is doing well  - No  Anticoagulation for 48 hours  - Cont other meds  - Chest XRay is pending  - interrogation showed device is functioning properly. No events.  Base rate is set at 50 BPM  - No heavy lifting x 1 week  - Pt may shower today  - No bath/swimming x 1 week  - ok to discharge home today PENDING chest xray and reading  - follow up with EP in 1 month   A/P  Patient S/P Single Chamber Aveir PPM  Paient is doing well  - No  Anticoagulation for 48 hours  - Cont other meds  - Chest XRay is done.  Device is in good position  - interrogation showed device is functioning properly. No events.  Base rate is set at 50 BPM  - No heavy lifting x 1 week  - Pt may shower today  - No bath/swimming x 1 week  - ok to discharge home today   - follow up with EP in 1 month

## 2025-05-10 NOTE — PROGRESS NOTE ADULT - SUBJECTIVE AND OBJECTIVE BOX
INTERVAL HPI/OVERNIGHT EVENTS:    Patient s/p  single chamber Aveir PPM.  No events overngiht  Patient is in NSR    MEDICATIONS  (STANDING):  bacitracin   Ointment 1 Application(s) Topical every 8 hours  chlorhexidine 2% Cloths 1 Application(s) Topical <User Schedule>  escitalopram 10 milliGRAM(s) Oral daily  lacosamide 150 milliGRAM(s) Oral every 12 hours  levETIRAcetam 1500 milliGRAM(s) Oral <User Schedule>  levETIRAcetam 1000 milliGRAM(s) Oral <User Schedule>  methimazole 10 milliGRAM(s) Oral <User Schedule>  vancomycin  IVPB 1000 milliGRAM(s) IV Intermittent once    MEDICATIONS  (PRN):  acetaminophen     Tablet .. 650 milliGRAM(s) Oral every 6 hours PRN Mild Pain (1 - 3)  atropine Injectable 1 milliGRAM(s) IV Push once PRN kimani  melatonin 3 milliGRAM(s) Oral at bedtime PRN Insomnia      Allergies    No Known Allergies    Intolerances    Vital Signs Last 24 Hrs  T(C): 36.5 (10 May 2025 05:10), Max: 36.9 (09 May 2025 09:25)  T(F): 97.7 (10 May 2025 05:10), Max: 98.4 (09 May 2025 09:25)  HR: 59 (10 May 2025 05:10) (59 - 93)  BP: 136/86 (10 May 2025 05:10) (106/84 - 157/85)  BP(mean): 101 (10 May 2025 05:10) (101 - 110)  RR: 17 (10 May 2025 05:10) (16 - 18)  SpO2: 98% (10 May 2025 05:10) (95% - 100%)    Parameters below as of 10 May 2025 05:10  Patient On (Oxygen Delivery Method): room air    HEENT KINJAL EOMI  Lung CTAB  Heart RRR normal S1 S2  abd +BS soft  groins No hematoma, no bleeding.  Right groin stitch removed  Ext no C/C/E      CXR is pending                   INTERVAL HPI/OVERNIGHT EVENTS:    Patient s/p  single chamber Aveir PPM.  No events overngiht  Patient is in NSR    MEDICATIONS  (STANDING):  bacitracin   Ointment 1 Application(s) Topical every 8 hours  chlorhexidine 2% Cloths 1 Application(s) Topical <User Schedule>  escitalopram 10 milliGRAM(s) Oral daily  lacosamide 150 milliGRAM(s) Oral every 12 hours  levETIRAcetam 1500 milliGRAM(s) Oral <User Schedule>  levETIRAcetam 1000 milliGRAM(s) Oral <User Schedule>  methimazole 10 milliGRAM(s) Oral <User Schedule>  vancomycin  IVPB 1000 milliGRAM(s) IV Intermittent once    MEDICATIONS  (PRN):  acetaminophen     Tablet .. 650 milliGRAM(s) Oral every 6 hours PRN Mild Pain (1 - 3)  atropine Injectable 1 milliGRAM(s) IV Push once PRN kimani  melatonin 3 milliGRAM(s) Oral at bedtime PRN Insomnia      Allergies    No Known Allergies    Intolerances    Vital Signs Last 24 Hrs  T(C): 36.5 (10 May 2025 05:10), Max: 36.9 (09 May 2025 09:25)  T(F): 97.7 (10 May 2025 05:10), Max: 98.4 (09 May 2025 09:25)  HR: 59 (10 May 2025 05:10) (59 - 93)  BP: 136/86 (10 May 2025 05:10) (106/84 - 157/85)  BP(mean): 101 (10 May 2025 05:10) (101 - 110)  RR: 17 (10 May 2025 05:10) (16 - 18)  SpO2: 98% (10 May 2025 05:10) (95% - 100%)    Parameters below as of 10 May 2025 05:10  Patient On (Oxygen Delivery Method): room air    HEENT KINJAL EOMI  Lung CTAB  Heart RRR normal S1 S2  abd +BS soft  groins No hematoma, no bleeding.  Right groin stitch removed  Ext no C/C/E      CXR:  device is in good position

## 2025-05-10 NOTE — DISCHARGE NOTE NURSING/CASE MANAGEMENT/SOCIAL WORK - NSDCPEFALRISK_GEN_ALL_CORE
For information on Fall & Injury Prevention, visit: [email or web address] OR  [email or web address] OR  https://www.cdc.gov/steadi/patient.html

## 2025-05-10 NOTE — DISCHARGE NOTE NURSING/CASE MANAGEMENT/SOCIAL WORK - FINANCIAL ASSISTANCE
[facility] provides services at a reduced cost to those who are determined to be eligible through [facility]’s financial assistance program. Information regarding [facility]’s financial assistance program can be found by going to [email or web address] or by calling [phone].

## 2025-05-14 DIAGNOSIS — E66.9 OBESITY, UNSPECIFIED: ICD-10-CM

## 2025-05-14 DIAGNOSIS — L40.9 PSORIASIS, UNSPECIFIED: ICD-10-CM

## 2025-05-14 DIAGNOSIS — G40.909 EPILEPSY, UNSPECIFIED, NOT INTRACTABLE, WITHOUT STATUS EPILEPTICUS: ICD-10-CM

## 2025-05-14 DIAGNOSIS — F41.9 ANXIETY DISORDER, UNSPECIFIED: ICD-10-CM

## 2025-05-14 DIAGNOSIS — Z95.0 PRESENCE OF CARDIAC PACEMAKER: ICD-10-CM

## 2025-05-14 DIAGNOSIS — D35.00 BENIGN NEOPLASM OF UNSPECIFIED ADRENAL GLAND: ICD-10-CM

## 2025-05-14 DIAGNOSIS — R55 SYNCOPE AND COLLAPSE: ICD-10-CM

## 2025-05-14 DIAGNOSIS — F17.210 NICOTINE DEPENDENCE, CIGARETTES, UNCOMPLICATED: ICD-10-CM

## 2025-05-14 DIAGNOSIS — E05.90 THYROTOXICOSIS, UNSPECIFIED WITHOUT THYROTOXIC CRISIS OR STORM: ICD-10-CM

## 2025-05-14 DIAGNOSIS — J44.9 CHRONIC OBSTRUCTIVE PULMONARY DISEASE, UNSPECIFIED: ICD-10-CM

## 2025-05-14 DIAGNOSIS — I45.5 OTHER SPECIFIED HEART BLOCK: ICD-10-CM

## 2025-05-20 ENCOUNTER — NON-APPOINTMENT (OUTPATIENT)
Age: 55
End: 2025-05-20

## 2025-05-20 ENCOUNTER — APPOINTMENT (OUTPATIENT)
Dept: CARDIOLOGY | Facility: CLINIC | Age: 55
End: 2025-05-20
Payer: COMMERCIAL

## 2025-05-20 PROCEDURE — 93298 REM INTERROG DEV EVAL SCRMS: CPT

## 2025-06-12 ENCOUNTER — APPOINTMENT (OUTPATIENT)
Dept: ELECTROPHYSIOLOGY | Facility: CLINIC | Age: 55
End: 2025-06-12
Payer: COMMERCIAL

## 2025-06-12 ENCOUNTER — NON-APPOINTMENT (OUTPATIENT)
Age: 55
End: 2025-06-12

## 2025-06-12 VITALS
WEIGHT: 220 LBS | DIASTOLIC BLOOD PRESSURE: 88 MMHG | HEART RATE: 66 BPM | HEIGHT: 64 IN | BODY MASS INDEX: 37.56 KG/M2 | SYSTOLIC BLOOD PRESSURE: 130 MMHG

## 2025-06-12 PROBLEM — Z51.89 VISIT FOR WOUND CHECK: Status: ACTIVE | Noted: 2025-06-12

## 2025-06-12 PROBLEM — Z95.0 PRESENCE OF LEADLESS CARDIAC PACEMAKER: Status: ACTIVE | Noted: 2025-06-12

## 2025-06-12 PROCEDURE — 93279 PRGRMG DEV EVAL PM/LDLS PM: CPT

## 2025-06-12 PROCEDURE — 99024 POSTOP FOLLOW-UP VISIT: CPT

## 2025-06-13 ENCOUNTER — NON-APPOINTMENT (OUTPATIENT)
Age: 55
End: 2025-06-13

## 2025-06-19 ENCOUNTER — APPOINTMENT (OUTPATIENT)
Dept: ENDOCRINOLOGY | Facility: CLINIC | Age: 55
End: 2025-06-19
Payer: COMMERCIAL

## 2025-06-19 ENCOUNTER — NON-APPOINTMENT (OUTPATIENT)
Age: 55
End: 2025-06-19

## 2025-06-19 VITALS
BODY MASS INDEX: 37.9 KG/M2 | HEART RATE: 93 BPM | HEIGHT: 64 IN | SYSTOLIC BLOOD PRESSURE: 130 MMHG | DIASTOLIC BLOOD PRESSURE: 80 MMHG | OXYGEN SATURATION: 95 % | WEIGHT: 222 LBS

## 2025-06-19 PROCEDURE — 99214 OFFICE O/P EST MOD 30 MIN: CPT

## 2025-06-19 RX ORDER — LEVETIRACETAM 1000 MG/1
1000 TABLET, FILM COATED ORAL
Refills: 0 | Status: ACTIVE | COMMUNITY

## 2025-06-19 RX ORDER — LACOSAMIDE 200 MG/1
200 TABLET ORAL
Refills: 0 | Status: ACTIVE | COMMUNITY

## 2025-07-15 ENCOUNTER — APPOINTMENT (OUTPATIENT)
Dept: CARDIOLOGY | Facility: CLINIC | Age: 55
End: 2025-07-15

## 2025-07-15 PROCEDURE — 93298 REM INTERROG DEV EVAL SCRMS: CPT

## 2025-08-13 ENCOUNTER — NON-APPOINTMENT (OUTPATIENT)
Age: 55
End: 2025-08-13

## 2025-08-19 ENCOUNTER — APPOINTMENT (OUTPATIENT)
Dept: CARDIOLOGY | Facility: CLINIC | Age: 55
End: 2025-08-19

## 2025-08-19 PROCEDURE — 93298 REM INTERROG DEV EVAL SCRMS: CPT
